# Patient Record
Sex: FEMALE | Race: BLACK OR AFRICAN AMERICAN | Employment: OTHER | URBAN - METROPOLITAN AREA
[De-identification: names, ages, dates, MRNs, and addresses within clinical notes are randomized per-mention and may not be internally consistent; named-entity substitution may affect disease eponyms.]

---

## 2017-02-27 RX ORDER — PANTOPRAZOLE SODIUM 40 MG/1
40 TABLET, DELAYED RELEASE ORAL
Qty: 30 TABLET | Refills: 3 | Status: SHIPPED | OUTPATIENT
Start: 2017-02-27 | End: 2017-10-12 | Stop reason: ALTCHOICE

## 2017-02-27 RX ORDER — LORAZEPAM 1 MG/1
TABLET ORAL
Qty: 30 TABLET | Refills: 0 | Status: SHIPPED | OUTPATIENT
Start: 2017-02-27 | End: 2017-05-02

## 2017-02-27 RX ORDER — IBUPROFEN 600 MG/1
600 TABLET ORAL EVERY 8 HOURS PRN
Qty: 45 TABLET | Refills: 0 | Status: SHIPPED | OUTPATIENT
Start: 2017-02-27 | End: 2017-08-27

## 2017-03-14 RX ORDER — IBUPROFEN 600 MG/1
TABLET ORAL
Refills: 0 | OUTPATIENT
Start: 2017-03-14

## 2017-04-19 ENCOUNTER — LAB ENCOUNTER (OUTPATIENT)
Dept: LAB | Facility: HOSPITAL | Age: 72
End: 2017-04-19
Attending: INTERNAL MEDICINE
Payer: MEDICARE

## 2017-04-19 DIAGNOSIS — M06.9 RHEUMATOID ARTHRITIS INVOLVING MULTIPLE SITES, UNSPECIFIED RHEUMATOID FACTOR PRESENCE: ICD-10-CM

## 2017-04-19 DIAGNOSIS — Z51.81 ENCOUNTER FOR THERAPEUTIC DRUG MONITORING: ICD-10-CM

## 2017-04-19 PROCEDURE — 86140 C-REACTIVE PROTEIN: CPT

## 2017-04-19 PROCEDURE — 85652 RBC SED RATE AUTOMATED: CPT

## 2017-04-19 PROCEDURE — 36415 COLL VENOUS BLD VENIPUNCTURE: CPT

## 2017-04-19 PROCEDURE — 82565 ASSAY OF CREATININE: CPT

## 2017-04-19 PROCEDURE — 84450 TRANSFERASE (AST) (SGOT): CPT

## 2017-04-19 PROCEDURE — 85025 COMPLETE CBC W/AUTO DIFF WBC: CPT

## 2017-04-19 PROCEDURE — 82040 ASSAY OF SERUM ALBUMIN: CPT

## 2017-05-02 ENCOUNTER — OFFICE VISIT (OUTPATIENT)
Dept: INTERNAL MEDICINE CLINIC | Facility: CLINIC | Age: 72
End: 2017-05-02

## 2017-05-02 VITALS
BODY MASS INDEX: 32.65 KG/M2 | SYSTOLIC BLOOD PRESSURE: 130 MMHG | HEART RATE: 74 BPM | TEMPERATURE: 98 F | DIASTOLIC BLOOD PRESSURE: 78 MMHG | HEIGHT: 65 IN | RESPIRATION RATE: 17 BRPM | WEIGHT: 196 LBS | OXYGEN SATURATION: 98 %

## 2017-05-02 DIAGNOSIS — Z12.11 SCREEN FOR COLON CANCER: ICD-10-CM

## 2017-05-02 DIAGNOSIS — Z00.00 MEDICARE ANNUAL WELLNESS VISIT, SUBSEQUENT: ICD-10-CM

## 2017-05-02 DIAGNOSIS — F41.0 ANXIETY ATTACK: ICD-10-CM

## 2017-05-02 DIAGNOSIS — Z23 NEED FOR VACCINATION FOR STREP PNEUMONIAE: ICD-10-CM

## 2017-05-02 DIAGNOSIS — Z12.31 VISIT FOR SCREENING MAMMOGRAM: ICD-10-CM

## 2017-05-02 DIAGNOSIS — Z13.6 SCREENING FOR CARDIOVASCULAR CONDITION: ICD-10-CM

## 2017-05-02 DIAGNOSIS — M05.719 RHEUMATOID ARTHRITIS INVOLVING SHOULDER WITH POSITIVE RHEUMATOID FACTOR, UNSPECIFIED LATERALITY (HCC): ICD-10-CM

## 2017-05-02 PROCEDURE — G0439 PPPS, SUBSEQ VISIT: HCPCS | Performed by: INTERNAL MEDICINE

## 2017-05-02 RX ORDER — LORAZEPAM 1 MG/1
TABLET ORAL
Qty: 30 TABLET | Refills: 1 | Status: SHIPPED | OUTPATIENT
Start: 2017-05-02 | End: 2018-02-14

## 2017-05-02 NOTE — PROGRESS NOTES
HPI:   Tete Arguello is a 80year old female who presents for a Medicare Subsequent Annual Wellness visit (Pt already had Initial Annual Wellness). H/o RA. On Enbrel. Doing well so far. Maintains active lifestyle. Eats vegetables and fatty food. MG Oral Tab Take 4 tablets every Wednesday with dinner. folic acid 1 MG Oral Tab Take one every morning. aspirin 325 MG Oral Tab Take  by mouth.  take 1 tablet (325MG)  by oral route  every day   Multiple Vitamins-Calcium (ONE-A-DAY WOMENS FORMULA) Oral Both Eyes Visual Acuity: Corrected Both Eyes Chart Acuity: 20/25          General Appearance:  Alert, cooperative, no distress, appears stated age   Head:  Normocephalic, without obvious abnormality, atraumatic   Eyes:  PERRL, conjunctiva/corneas clear, Frandy Vasques does not have a Living Will on file in [address] [place] [address] Rd.  The patient has this document but we do not have it in [facility], and patient is instructed to get our office a copy of it for scanning into [employer] on file in Epic:    Mike Yes    Hearing Problems?: No     Functional Status     Hearing Problems?: No    Vision Problems? : No    Difficulty walking?: No    Difficulty dressing or bathing?: No    Problems with daily activities? : No    Memory Problems?: No      Fall/Risk Assessmen Preventative Physical Exam only, or if medically necessary Electrocardiogram date       Colorectal Cancer Screening      Colonoscopy Screen every 10 years ordered Update Health Maintenance if applicable    Flex Sigmoidoscopy Screen every 10 years No result prescription benefits, but Medicare does not cover unless Medically needed    Zoster  Not covered by Medicare Part B No vaccine history found This may be covered with your pharmacy  prescription benefits        SPECIFIC DISEASE MONITORING Internal Lab or P

## 2017-05-02 NOTE — PATIENT INSTRUCTIONS
Miranda Thayer's SCREENING SCHEDULE   Tests on this list are recommended by your physician but may not be covered, or covered at this frequency, by your insurer. Please check with your insurance carrier before scheduling to verify coverage.    Lynell Fabry Screen  Covered every 5 years No results found for this or any previous visit. No flowsheet data found. Fecal Occult Blood   Covered Annually No results found for: FOB, OCCULTSTOOL No flowsheet data found.      Barium Enema-   uncomfortable but covered birthday    Hepatitis B for Moderate/High Risk       No orders found for this or any previous visit.  Medium/high risk factors:   End-stage renal disease   Hemophiliacs who received Factor VIII or IX concentrates   Clients of institutions for the mentally r

## 2017-05-05 ENCOUNTER — NURSE ONLY (OUTPATIENT)
Dept: INTERNAL MEDICINE CLINIC | Facility: CLINIC | Age: 72
End: 2017-05-05

## 2017-05-05 DIAGNOSIS — Z51.81 ENCOUNTER FOR THERAPEUTIC DRUG MONITORING: ICD-10-CM

## 2017-05-05 DIAGNOSIS — M06.9 RHEUMATOID ARTHRITIS INVOLVING MULTIPLE SITES, UNSPECIFIED RHEUMATOID FACTOR PRESENCE: ICD-10-CM

## 2017-05-05 DIAGNOSIS — Z13.6 SCREENING FOR CARDIOVASCULAR CONDITION: ICD-10-CM

## 2017-05-05 PROCEDURE — 80061 LIPID PANEL: CPT | Performed by: INTERNAL MEDICINE

## 2017-05-05 PROCEDURE — 80053 COMPREHEN METABOLIC PANEL: CPT | Performed by: INTERNAL MEDICINE

## 2017-05-05 PROCEDURE — 36415 COLL VENOUS BLD VENIPUNCTURE: CPT | Performed by: INTERNAL MEDICINE

## 2017-05-05 PROCEDURE — 86140 C-REACTIVE PROTEIN: CPT | Performed by: INTERNAL MEDICINE

## 2017-05-05 PROCEDURE — 85652 RBC SED RATE AUTOMATED: CPT | Performed by: INTERNAL MEDICINE

## 2017-05-05 PROCEDURE — 85025 COMPLETE CBC W/AUTO DIFF WBC: CPT | Performed by: INTERNAL MEDICINE

## 2017-08-28 RX ORDER — IBUPROFEN 600 MG/1
TABLET ORAL
Qty: 60 TABLET | Refills: 1 | Status: SHIPPED | OUTPATIENT
Start: 2017-08-28 | End: 2019-01-27

## 2017-09-06 ENCOUNTER — LAB ENCOUNTER (OUTPATIENT)
Dept: LAB | Facility: HOSPITAL | Age: 72
End: 2017-09-06
Attending: INTERNAL MEDICINE
Payer: MEDICARE

## 2017-09-06 DIAGNOSIS — Z51.81 ENCOUNTER FOR THERAPEUTIC DRUG MONITORING: ICD-10-CM

## 2017-09-06 DIAGNOSIS — M06.9 RHEUMATOID ARTHRITIS INVOLVING MULTIPLE SITES, UNSPECIFIED RHEUMATOID FACTOR PRESENCE: ICD-10-CM

## 2017-09-06 LAB
ALBUMIN SERPL BCP-MCNC: 4.1 G/DL (ref 3.5–4.8)
AST SERPL-CCNC: 24 U/L (ref 15–41)
BASOPHILS # BLD: 0 K/UL (ref 0–0.2)
BASOPHILS NFR BLD: 1 %
CREAT SERPL-MCNC: 0.89 MG/DL (ref 0.5–1.5)
CRP SERPL-MCNC: 0.5 MG/DL (ref 0–0.9)
EOSINOPHIL # BLD: 0.1 K/UL (ref 0–0.7)
EOSINOPHIL NFR BLD: 1 %
ERYTHROCYTE [DISTWIDTH] IN BLOOD BY AUTOMATED COUNT: 15 % (ref 11–15)
ERYTHROCYTE [SEDIMENTATION RATE] IN BLOOD: 16 MM/HR (ref 0–30)
HCT VFR BLD AUTO: 41.2 % (ref 35–48)
HGB BLD-MCNC: 13.2 G/DL (ref 12–16)
LYMPHOCYTES # BLD: 1.8 K/UL (ref 1–4)
LYMPHOCYTES NFR BLD: 33 %
MCH RBC QN AUTO: 23.7 PG (ref 27–32)
MCHC RBC AUTO-ENTMCNC: 32 G/DL (ref 32–37)
MCV RBC AUTO: 74.2 FL (ref 80–100)
MONOCYTES # BLD: 0.6 K/UL (ref 0–1)
MONOCYTES NFR BLD: 11 %
NEUTROPHILS # BLD AUTO: 3 K/UL (ref 1.8–7.7)
NEUTROPHILS NFR BLD: 55 %
PLATELET # BLD AUTO: 215 K/UL (ref 140–400)
PMV BLD AUTO: 9.4 FL (ref 7.4–10.3)
RBC # BLD AUTO: 5.56 M/UL (ref 3.7–5.4)
WBC # BLD AUTO: 5.5 K/UL (ref 4–11)

## 2017-09-06 PROCEDURE — 82565 ASSAY OF CREATININE: CPT

## 2017-09-06 PROCEDURE — 85025 COMPLETE CBC W/AUTO DIFF WBC: CPT

## 2017-09-06 PROCEDURE — 85060 BLOOD SMEAR INTERPRETATION: CPT

## 2017-09-06 PROCEDURE — 86140 C-REACTIVE PROTEIN: CPT

## 2017-09-06 PROCEDURE — 82040 ASSAY OF SERUM ALBUMIN: CPT

## 2017-09-06 PROCEDURE — 36415 COLL VENOUS BLD VENIPUNCTURE: CPT

## 2017-09-06 PROCEDURE — 85652 RBC SED RATE AUTOMATED: CPT

## 2017-09-06 PROCEDURE — 84450 TRANSFERASE (AST) (SGOT): CPT

## 2017-10-12 ENCOUNTER — OFFICE VISIT (OUTPATIENT)
Dept: INTERNAL MEDICINE CLINIC | Facility: CLINIC | Age: 72
End: 2017-10-12

## 2017-10-12 VITALS
RESPIRATION RATE: 17 BRPM | HEIGHT: 65 IN | SYSTOLIC BLOOD PRESSURE: 130 MMHG | HEART RATE: 90 BPM | BODY MASS INDEX: 31.82 KG/M2 | TEMPERATURE: 98 F | OXYGEN SATURATION: 98 % | WEIGHT: 191 LBS | DIASTOLIC BLOOD PRESSURE: 80 MMHG

## 2017-10-12 DIAGNOSIS — Z23 NEEDS FLU SHOT: ICD-10-CM

## 2017-10-12 DIAGNOSIS — Z12.11 COLON CANCER SCREENING: ICD-10-CM

## 2017-10-12 DIAGNOSIS — F51.01 PRIMARY INSOMNIA: ICD-10-CM

## 2017-10-12 DIAGNOSIS — Z12.31 VISIT FOR SCREENING MAMMOGRAM: ICD-10-CM

## 2017-10-12 DIAGNOSIS — E66.09 CLASS 1 OBESITY DUE TO EXCESS CALORIES WITHOUT SERIOUS COMORBIDITY WITH BODY MASS INDEX (BMI) OF 31.0 TO 31.9 IN ADULT: ICD-10-CM

## 2017-10-12 DIAGNOSIS — N64.4 BILATERAL MASTODYNIA: Primary | ICD-10-CM

## 2017-10-12 DIAGNOSIS — Z23 NEED FOR VACCINATION FOR STREP PNEUMONIAE: ICD-10-CM

## 2017-10-12 DIAGNOSIS — Z78.0 MENOPAUSE: ICD-10-CM

## 2017-10-12 PROCEDURE — 99214 OFFICE O/P EST MOD 30 MIN: CPT | Performed by: INTERNAL MEDICINE

## 2017-10-12 PROCEDURE — G0008 ADMIN INFLUENZA VIRUS VAC: HCPCS | Performed by: INTERNAL MEDICINE

## 2017-10-12 PROCEDURE — 90653 IIV ADJUVANT VACCINE IM: CPT | Performed by: INTERNAL MEDICINE

## 2017-10-12 RX ORDER — TRAZODONE HYDROCHLORIDE 100 MG/1
100 TABLET ORAL NIGHTLY PRN
Qty: 90 TABLET | Refills: 1 | Status: SHIPPED | OUTPATIENT
Start: 2017-10-12 | End: 2018-02-28

## 2017-10-12 NOTE — PROGRESS NOTES
HPI:    Patient ID: Myron Salinas is a 71year old female. HPI  C/o tingling and funny sensation of both breast around areolar area. No nipple discharge, palpable mass. C/o poor sleep. Had difficulty falling asleep. Lives along.  Denies caffeine  Ab person, place, and time. She appears well-developed. No distress. HENT:   Head: Normocephalic. Mouth/Throat: Oropharynx is clear and moist.   Eyes: Conjunctivae and EOM are normal. Pupils are equal, round, and reactive to light.    Neck: Normal range of Orders Placed This Encounter      Pneumococcal (Prevnar 13) (76510) (DX V03.82/Z23)    Meds This Visit:  Signed Prescriptions Disp Refills    TraZODone HCl 100 MG Oral Tab 90 tablet 1      Sig: Take 1 tablet (100 mg total) by mouth nightly

## 2017-10-12 NOTE — PROGRESS NOTES
Flu shot administered in left arm IM  Patient denies allergy to eggs, flu shot before, being sick today, diagnosed with Guillain barre syndrome. Patient tolerated well no reaction at this time.   Date of Birth Verified   Ordering Dr. Beronica Colbert

## 2017-11-01 RX ORDER — ETANERCEPT 50 MG/ML
50 SOLUTION SUBCUTANEOUS WEEKLY
Qty: 12 ML | Refills: 0 | Status: SHIPPED | OUTPATIENT
Start: 2017-11-01 | End: 2018-02-28

## 2017-11-01 NOTE — TELEPHONE ENCOUNTER
LOV:12-14  Last Filled:10-19-16, #12 with 3 refills  Labs:   Future Appointments  Date Time Provider Willie Brandon   11/24/2017 2:00 PM [facility] DEXA RM1 [facility] DEXA EM [facility]   11/24/2017 2:40 PM [facility] VICKI RM2 [facility] VICKI EM [facility]       Please Advise

## 2017-12-01 RX ORDER — LORAZEPAM 1 MG/1
1 TABLET ORAL NIGHTLY
Qty: 90 TABLET | Refills: 0 | Status: SHIPPED | OUTPATIENT
Start: 2017-12-01 | End: 2018-09-19

## 2017-12-28 ENCOUNTER — HOSPITAL ENCOUNTER (OUTPATIENT)
Dept: MAMMOGRAPHY | Facility: HOSPITAL | Age: 72
Discharge: HOME | End: 2017-12-28
Attending: INTERNAL MEDICINE
Payer: MEDICARE

## 2017-12-28 ENCOUNTER — HOSPITAL ENCOUNTER (OUTPATIENT)
Dept: BONE DENSITY | Facility: HOSPITAL | Age: 72
Discharge: HOME | End: 2017-12-28
Attending: INTERNAL MEDICINE
Payer: MEDICARE

## 2017-12-28 DIAGNOSIS — Z12.31 VISIT FOR SCREENING MAMMOGRAM: ICD-10-CM

## 2017-12-28 DIAGNOSIS — N64.4 BILATERAL MASTODYNIA: ICD-10-CM

## 2017-12-28 DIAGNOSIS — Z78.0 MENOPAUSE: ICD-10-CM

## 2017-12-28 PROCEDURE — 77067 SCR MAMMO BI INCL CAD: CPT | Performed by: INTERNAL MEDICINE

## 2017-12-28 PROCEDURE — 77080 DXA BONE DENSITY AXIAL: CPT | Performed by: INTERNAL MEDICINE

## 2018-01-29 RX ORDER — MEDROXYPROGESTERONE ACETATE 150 MG/ML
50 INJECTION, SUSPENSION INTRAMUSCULAR WEEKLY
Qty: 12 ML | Refills: 0 | OUTPATIENT
Start: 2018-01-29

## 2018-01-29 NOTE — TELEPHONE ENCOUNTER
HON:[id number]  Last Filled:11-1, 12ml with 0 refill  Labs:  No future appointments.     Please Advise

## 2018-02-14 ENCOUNTER — OFFICE VISIT (OUTPATIENT)
Dept: INTERNAL MEDICINE CLINIC | Facility: CLINIC | Age: 73
End: 2018-02-14

## 2018-02-14 VITALS
SYSTOLIC BLOOD PRESSURE: 110 MMHG | HEART RATE: 77 BPM | BODY MASS INDEX: 31.16 KG/M2 | WEIGHT: 187 LBS | DIASTOLIC BLOOD PRESSURE: 60 MMHG | TEMPERATURE: 98 F | HEIGHT: 65 IN | RESPIRATION RATE: 17 BRPM | OXYGEN SATURATION: 98 %

## 2018-02-14 DIAGNOSIS — R20.2 PARESTHESIA AND PAIN OF LEFT EXTREMITY: Primary | ICD-10-CM

## 2018-02-14 DIAGNOSIS — R73.9 HYPERGLYCEMIA: ICD-10-CM

## 2018-02-14 DIAGNOSIS — M05.9 RHEUMATOID ARTHRITIS WITH POSITIVE RHEUMATOID FACTOR, INVOLVING UNSPECIFIED SITE (HCC): ICD-10-CM

## 2018-02-14 DIAGNOSIS — M79.609 PARESTHESIA AND PAIN OF LEFT EXTREMITY: Primary | ICD-10-CM

## 2018-02-14 PROBLEM — R12 HEART BURN: Status: ACTIVE | Noted: 2018-02-14

## 2018-02-14 PROCEDURE — 99213 OFFICE O/P EST LOW 20 MIN: CPT | Performed by: INTERNAL MEDICINE

## 2018-02-14 NOTE — PROGRESS NOTES
HPI:    Patient ID: Max Arzate is a 71year old female. HPI    H/O RA, retired [employer] , now works part time as . C/o \" feeling of worms crawling on her left lower leg\". Denies weakness, low back pain. No urine , stool Allergies:No Known Allergies   PHYSICAL EXAM:   Physical Exam   Constitutional: She is oriented to person, place, and time. She appears well-developed. No distress. HENT:   Head: Normocephalic.    Mouth/Throat: Oropharynx is clear and moist. No oropha

## 2018-02-15 ENCOUNTER — TELEPHONE (OUTPATIENT)
Dept: RHEUMATOLOGY | Facility: CLINIC | Age: 73
End: 2018-02-15

## 2018-02-15 ENCOUNTER — LAB ENCOUNTER (OUTPATIENT)
Dept: LAB | Facility: HOSPITAL | Age: 73
End: 2018-02-15
Attending: INTERNAL MEDICINE
Payer: MEDICARE

## 2018-02-15 DIAGNOSIS — R20.2 PARESTHESIA AND PAIN OF LEFT EXTREMITY: ICD-10-CM

## 2018-02-15 DIAGNOSIS — Z51.81 ENCOUNTER FOR THERAPEUTIC DRUG MONITORING: ICD-10-CM

## 2018-02-15 DIAGNOSIS — M05.79 SEROPOSITIVE RHEUMATOID ARTHRITIS OF MULTIPLE SITES (HCC): ICD-10-CM

## 2018-02-15 DIAGNOSIS — R79.9 ABNORMAL FINDING OF BLOOD CHEMISTRY: ICD-10-CM

## 2018-02-15 DIAGNOSIS — M79.609 PARESTHESIA AND PAIN OF LEFT EXTREMITY: ICD-10-CM

## 2018-02-15 LAB
ALBUMIN SERPL BCP-MCNC: 3.9 G/DL (ref 3.5–4.8)
ALBUMIN/GLOB SERPL: 1.1 {RATIO} (ref 1–2)
ALP SERPL-CCNC: 71 U/L (ref 32–100)
ALT SERPL-CCNC: 34 U/L (ref 14–54)
ANION GAP SERPL CALC-SCNC: 6 MMOL/L (ref 0–18)
AST SERPL-CCNC: 30 U/L (ref 15–41)
BASOPHILS # BLD: 0 K/UL (ref 0–0.2)
BASOPHILS NFR BLD: 1 %
BILIRUB SERPL-MCNC: 0.8 MG/DL (ref 0.3–1.2)
BUN SERPL-MCNC: 19 MG/DL (ref 8–20)
BUN/CREAT SERPL: 19.6 (ref 10–20)
CALCIUM SERPL-MCNC: 9.2 MG/DL (ref 8.5–10.5)
CHLORIDE SERPL-SCNC: 105 MMOL/L (ref 95–110)
CO2 SERPL-SCNC: 26 MMOL/L (ref 22–32)
CREAT SERPL-MCNC: 0.97 MG/DL (ref 0.5–1.5)
CRP SERPL-MCNC: 0.5 MG/DL (ref 0–0.9)
EOSINOPHIL # BLD: 0.1 K/UL (ref 0–0.7)
EOSINOPHIL NFR BLD: 2 %
ERYTHROCYTE [DISTWIDTH] IN BLOOD BY AUTOMATED COUNT: 14.7 % (ref 11–15)
ERYTHROCYTE [SEDIMENTATION RATE] IN BLOOD: 15 MM/HR (ref 0–30)
FOLATE SERPL-MCNC: >24 NG/ML
GLOBULIN PLAS-MCNC: 3.4 G/DL (ref 2.5–3.7)
GLUCOSE SERPL-MCNC: 105 MG/DL (ref 70–99)
HCT VFR BLD AUTO: 40.6 % (ref 35–48)
HGB BLD-MCNC: 12.9 G/DL (ref 12–16)
LYMPHOCYTES # BLD: 1.8 K/UL (ref 1–4)
LYMPHOCYTES NFR BLD: 35 %
MCH RBC QN AUTO: 24 PG (ref 27–32)
MCHC RBC AUTO-ENTMCNC: 31.7 G/DL (ref 32–37)
MCV RBC AUTO: 75.7 FL (ref 80–100)
MONOCYTES # BLD: 0.6 K/UL (ref 0–1)
MONOCYTES NFR BLD: 11 %
NEUTROPHILS # BLD AUTO: 2.7 K/UL (ref 1.8–7.7)
NEUTROPHILS NFR BLD: 52 %
OSMOLALITY UR CALC.SUM OF ELEC: 287 MOSM/KG (ref 275–295)
PATIENT FASTING: YES
PLATELET # BLD AUTO: 254 K/UL (ref 140–400)
PMV BLD AUTO: 9.9 FL (ref 7.4–10.3)
POTASSIUM SERPL-SCNC: 4.3 MMOL/L (ref 3.3–5.1)
PROT SERPL-MCNC: 7.3 G/DL (ref 5.9–8.4)
RBC # BLD AUTO: 5.36 M/UL (ref 3.7–5.4)
SODIUM SERPL-SCNC: 137 MMOL/L (ref 136–144)
VIT B12 SERPL-MCNC: 835 PG/ML (ref 181–914)
WBC # BLD AUTO: 5.3 K/UL (ref 4–11)

## 2018-02-15 PROCEDURE — 82746 ASSAY OF FOLIC ACID SERUM: CPT

## 2018-02-15 PROCEDURE — 85025 COMPLETE CBC W/AUTO DIFF WBC: CPT

## 2018-02-15 PROCEDURE — 85652 RBC SED RATE AUTOMATED: CPT

## 2018-02-15 PROCEDURE — 82607 VITAMIN B-12: CPT

## 2018-02-15 PROCEDURE — 36415 COLL VENOUS BLD VENIPUNCTURE: CPT

## 2018-02-15 PROCEDURE — 86140 C-REACTIVE PROTEIN: CPT

## 2018-02-15 PROCEDURE — 80053 COMPREHEN METABOLIC PANEL: CPT

## 2018-02-15 PROCEDURE — 83036 HEMOGLOBIN GLYCOSYLATED A1C: CPT

## 2018-02-16 LAB — HBA1C MFR BLD: 6 % (ref 4–6)

## 2018-02-26 RX ORDER — FOLIC ACID 1 MG/1
TABLET ORAL
Qty: 90 TABLET | Refills: 3 | Status: SHIPPED | OUTPATIENT
Start: 2018-02-26 | End: 2018-09-19

## 2018-02-26 NOTE — TELEPHONE ENCOUNTER
EUS:03-83-92  Last Filled:10-19-16, #90 with 3 refills  Labs:   Future Appointments  Date Time Provider Willie Brandon   2/28/2018 4:20 PM Benito Sherman MD [facility] [place] [facility] [place]       Please Advise

## 2018-02-27 NOTE — PROGRESS NOTES
Yenifer Bonner is a 80-year-old woman with CCP and RF positive rheumatoid arthritis, who I last saw December 14th of 2016, with her arthritis doing well on Enbrel weekly, and methotrexate 10mg weekly.   Since, she stopped her methotrexate, and has remained only Allergies   PHYSICAL EXAM:   Physical Exam   Constitutional: She is oriented to person, place, and time. She appears well-developed and well-nourished. HENT:   Head: Normocephalic.    Eyes: Conjunctivae are normal.   Cardiovascular: Normal rate, regular r

## 2018-02-28 ENCOUNTER — OFFICE VISIT (OUTPATIENT)
Dept: RHEUMATOLOGY | Facility: CLINIC | Age: 73
End: 2018-02-28

## 2018-02-28 VITALS — DIASTOLIC BLOOD PRESSURE: 85 MMHG | SYSTOLIC BLOOD PRESSURE: 132 MMHG | HEART RATE: 69 BPM | HEIGHT: 65 IN

## 2018-02-28 DIAGNOSIS — M05.79 SEROPOSITIVE RHEUMATOID ARTHRITIS OF MULTIPLE SITES (HCC): Primary | ICD-10-CM

## 2018-02-28 DIAGNOSIS — Z51.81 ENCOUNTER FOR THERAPEUTIC DRUG MONITORING: ICD-10-CM

## 2018-02-28 PROCEDURE — G0463 HOSPITAL OUTPT CLINIC VISIT: HCPCS | Performed by: INTERNAL MEDICINE

## 2018-02-28 PROCEDURE — 99214 OFFICE O/P EST MOD 30 MIN: CPT | Performed by: INTERNAL MEDICINE

## 2018-04-27 NOTE — PROGRESS NOTES
Patient presented in office today for fasting blood draw. Blood draw successful in left arm  Adarsh:  Cbc,cmp,lipid,esr,crp  D. O.B verified   Orders per Doctor Co none

## 2018-04-30 ENCOUNTER — TELEPHONE (OUTPATIENT)
Dept: RHEUMATOLOGY | Facility: CLINIC | Age: 73
End: 2018-04-30

## 2018-05-16 ENCOUNTER — OFFICE VISIT (OUTPATIENT)
Dept: INTERNAL MEDICINE CLINIC | Facility: CLINIC | Age: 73
End: 2018-05-16

## 2018-05-16 VITALS
OXYGEN SATURATION: 98 % | SYSTOLIC BLOOD PRESSURE: 130 MMHG | BODY MASS INDEX: 31.27 KG/M2 | RESPIRATION RATE: 17 BRPM | HEIGHT: 65.5 IN | WEIGHT: 190 LBS | DIASTOLIC BLOOD PRESSURE: 82 MMHG | TEMPERATURE: 98 F | HEART RATE: 83 BPM

## 2018-05-16 DIAGNOSIS — Z28.21 VACCINATION REFUSED BY PATIENT: ICD-10-CM

## 2018-05-16 DIAGNOSIS — I87.2 VENOUS INSUFFICIENCY: ICD-10-CM

## 2018-05-16 DIAGNOSIS — Z00.00 ENCOUNTER FOR ANNUAL HEALTH EXAMINATION: Primary | ICD-10-CM

## 2018-05-16 DIAGNOSIS — E78.2 MIXED HYPERLIPIDEMIA: ICD-10-CM

## 2018-05-16 DIAGNOSIS — Z12.11 COLON CANCER SCREENING: ICD-10-CM

## 2018-05-16 DIAGNOSIS — M05.79 SEROPOSITIVE RHEUMATOID ARTHRITIS OF MULTIPLE SITES (HCC): ICD-10-CM

## 2018-05-16 PROCEDURE — G0439 PPPS, SUBSEQ VISIT: HCPCS | Performed by: INTERNAL MEDICINE

## 2018-05-16 RX ORDER — PREDNISOLONE ACETATE 10 MG/ML
SUSPENSION/ DROPS OPHTHALMIC
COMMUNITY
Start: 2016-09-27 | End: 2018-05-17 | Stop reason: ALTCHOICE

## 2018-05-16 NOTE — PROGRESS NOTES
HPI:   Landa Cushing is a 71year old female who presents for a Medicare Subsequent Annual Wellness visit (Pt already had Initial Annual Wellness). Sorethroat  And right sided earache for 3days witg fever of 102 F  . Took OTC alleve with relief.  Damian Box Encounters:  05/16/18 : 190 lb  02/14/18 : 187 lb  10/12/17 : 191 lb     Last Cholesterol Labs:     Lab Results  Component Value Date   CHOLEST 186 05/05/2017   HDL 47 05/05/2017    (H) 05/05/2017   TRIG 84 05/05/2017          Last Chemistry Labs: of breath with exertion  CARDIOVASCULAR: denies chest pain on exertion  GI: denies abdominal pain, denies heartburn  : denies dysuria, vaginal discharge or itching, no complaint of urinary incontinen   MUSCULOSKELETAL: denies back pain  NEURO: denies hea sounds active all four quadrants,  no masses, no organomegaly, healed surgical scar. Pelvic: Deferred   Extremities: Extremities normal, atraumatic, no cyanosis ,  varicose veins bilateral lower extremities ,trace pedal edema.     Pulses: 2+ and symmetri patient indicates understanding of these issues and agrees to the plan. Reinforced healthy diet, lifestyle, and exercise. Lab work ordered. Consult ordered. Continue with current treatment plan. Follow up in  6  month(s).     Return in about 6 months ( Density Screening      Dexascan Every two years Last Dexa Scan:   XR DEXA BONE DENSITOMETRY (CPT=77080) 12/28/2017    No flowsheet data found.     Pap and Pelvic      Pap: Every 3 yrs age 21-65 or Pap+HPV every 5 yrs age 33-67, age 72 and older at high risk

## 2018-05-16 NOTE — PATIENT INSTRUCTIONS
Arnulfo Thayer's SCREENING SCHEDULE   Tests on this list are recommended by your physician but may not be covered, or covered at this frequency, by your insurer. Please check with your insurance carrier before scheduling to verify coverage.    Cornelious Halo Colorectal Cancer Screening  Covered up to Age 76     Colonoscopy Screen   Covered every 10 years- more often if abnormal Colonoscopy,10 Years due on 08/09/1996 Update Health Maintenance if applicable    Flex Sigmoidoscopy Screen  Covered every 5 years N (Prevnar)  Covered Once after 65   Orders placed or performed in visit on 10/12/17  -PNEUMOCOCCAL VACC, 13 CANDY IM   Orders placed or performed in visit on 05/02/17  -PNEUMOCOCCAL VACC, 13 CANDY IM    Please get once after your 65th birthday    Pneumococcal 2

## 2018-05-17 PROBLEM — R12 HEART BURN: Status: RESOLVED | Noted: 2018-02-14 | Resolved: 2018-05-17

## 2018-09-06 ENCOUNTER — LAB ENCOUNTER (OUTPATIENT)
Dept: LAB | Facility: HOSPITAL | Age: 73
End: 2018-09-06
Attending: INTERNAL MEDICINE
Payer: MEDICARE

## 2018-09-06 DIAGNOSIS — Z51.81 ENCOUNTER FOR THERAPEUTIC DRUG MONITORING: ICD-10-CM

## 2018-09-06 DIAGNOSIS — M05.79 SEROPOSITIVE RHEUMATOID ARTHRITIS OF MULTIPLE SITES (HCC): ICD-10-CM

## 2018-09-06 DIAGNOSIS — E78.2 MIXED HYPERLIPIDEMIA: ICD-10-CM

## 2018-09-06 LAB
ALBUMIN SERPL BCP-MCNC: 3.9 G/DL (ref 3.5–4.8)
AST SERPL-CCNC: 25 U/L (ref 15–41)
BASOPHILS # BLD: 0 K/UL (ref 0–0.2)
BASOPHILS NFR BLD: 1 %
CHOLEST SERPL-MCNC: 226 MG/DL (ref 110–200)
CREAT SERPL-MCNC: 1 MG/DL (ref 0.5–1.5)
CRP SERPL-MCNC: 0.6 MG/DL (ref 0–0.9)
EOSINOPHIL # BLD: 0.1 K/UL (ref 0–0.7)
EOSINOPHIL NFR BLD: 2 %
ERYTHROCYTE [DISTWIDTH] IN BLOOD BY AUTOMATED COUNT: 15 % (ref 11–15)
ERYTHROCYTE [SEDIMENTATION RATE] IN BLOOD: 19 MM/HR (ref 0–30)
HCT VFR BLD AUTO: 39.2 % (ref 35–48)
HDLC SERPL-MCNC: 49 MG/DL
HGB BLD-MCNC: 12.5 G/DL (ref 12–16)
LDLC SERPL CALC-MCNC: 154 MG/DL (ref 0–99)
LYMPHOCYTES # BLD: 1.5 K/UL (ref 1–4)
LYMPHOCYTES NFR BLD: 32 %
MCH RBC QN AUTO: 23.9 PG (ref 27–32)
MCHC RBC AUTO-ENTMCNC: 31.8 G/DL (ref 32–37)
MCV RBC AUTO: 75.1 FL (ref 80–100)
MONOCYTES # BLD: 0.5 K/UL (ref 0–1)
MONOCYTES NFR BLD: 11 %
NEUTROPHILS # BLD AUTO: 2.6 K/UL (ref 1.8–7.7)
NEUTROPHILS NFR BLD: 55 %
NONHDLC SERPL-MCNC: 177 MG/DL
PLATELET # BLD AUTO: 186 K/UL (ref 140–400)
PMV BLD AUTO: 9.5 FL (ref 7.4–10.3)
RBC # BLD AUTO: 5.22 M/UL (ref 3.7–5.4)
TRIGL SERPL-MCNC: 117 MG/DL (ref 1–149)
WBC # BLD AUTO: 4.7 K/UL (ref 4–11)

## 2018-09-06 PROCEDURE — 82040 ASSAY OF SERUM ALBUMIN: CPT

## 2018-09-06 PROCEDURE — 36415 COLL VENOUS BLD VENIPUNCTURE: CPT

## 2018-09-06 PROCEDURE — 80061 LIPID PANEL: CPT

## 2018-09-06 PROCEDURE — 82565 ASSAY OF CREATININE: CPT

## 2018-09-06 PROCEDURE — 85652 RBC SED RATE AUTOMATED: CPT

## 2018-09-06 PROCEDURE — 86140 C-REACTIVE PROTEIN: CPT

## 2018-09-06 PROCEDURE — 85025 COMPLETE CBC W/AUTO DIFF WBC: CPT

## 2018-09-06 PROCEDURE — 84450 TRANSFERASE (AST) (SGOT): CPT

## 2018-09-19 ENCOUNTER — OFFICE VISIT (OUTPATIENT)
Dept: INTERNAL MEDICINE CLINIC | Facility: CLINIC | Age: 73
End: 2018-09-19
Payer: MEDICARE

## 2018-09-19 VITALS
TEMPERATURE: 98 F | HEIGHT: 65.5 IN | OXYGEN SATURATION: 99 % | DIASTOLIC BLOOD PRESSURE: 60 MMHG | WEIGHT: 191 LBS | SYSTOLIC BLOOD PRESSURE: 122 MMHG | BODY MASS INDEX: 31.44 KG/M2 | RESPIRATION RATE: 21 BRPM | HEART RATE: 79 BPM

## 2018-09-19 DIAGNOSIS — E78.2 MIXED HYPERLIPIDEMIA: Primary | ICD-10-CM

## 2018-09-19 DIAGNOSIS — F41.0 ANXIETY ATTACK: ICD-10-CM

## 2018-09-19 DIAGNOSIS — M67.911 TENDINOPATHY OF RIGHT SHOULDER: ICD-10-CM

## 2018-09-19 DIAGNOSIS — F51.01 PRIMARY INSOMNIA: ICD-10-CM

## 2018-09-19 DIAGNOSIS — M05.9 RHEUMATOID ARTHRITIS WITH POSITIVE RHEUMATOID FACTOR, INVOLVING UNSPECIFIED SITE (HCC): ICD-10-CM

## 2018-09-19 DIAGNOSIS — Z23 NEED FOR VACCINATION FOR STREP PNEUMONIAE: ICD-10-CM

## 2018-09-19 PROCEDURE — G0009 ADMIN PNEUMOCOCCAL VACCINE: HCPCS | Performed by: INTERNAL MEDICINE

## 2018-09-19 PROCEDURE — 90732 PPSV23 VACC 2 YRS+ SUBQ/IM: CPT | Performed by: INTERNAL MEDICINE

## 2018-09-19 PROCEDURE — 99214 OFFICE O/P EST MOD 30 MIN: CPT | Performed by: INTERNAL MEDICINE

## 2018-09-19 RX ORDER — ROSUVASTATIN CALCIUM 10 MG/1
10 TABLET, COATED ORAL NIGHTLY
Qty: 90 TABLET | Refills: 1 | Status: SHIPPED | OUTPATIENT
Start: 2018-09-19 | End: 2019-05-16 | Stop reason: ALTCHOICE

## 2018-09-19 RX ORDER — FOLIC ACID 1 MG/1
TABLET ORAL
Qty: 90 TABLET | Refills: 3 | Status: SHIPPED | OUTPATIENT
Start: 2018-09-19 | End: 2019-12-04

## 2018-09-19 RX ORDER — LORAZEPAM 1 MG/1
1 TABLET ORAL AS NEEDED
Qty: 60 TABLET | Refills: 0 | Status: SHIPPED | OUTPATIENT
Start: 2018-09-19 | End: 2019-05-16

## 2018-09-19 NOTE — PROGRESS NOTES
Vaccination administered in left arm IM  Patient tolerated well no reaction at this time, NO blood at injection site band aid applied.   Vaccine given:  pcv-23  Orders per Doctor Co

## 2018-09-19 NOTE — PROGRESS NOTES
HPI:    Patient ID: Octavio Siddiqi is a 68year old female. HPI   Known to RA , on Enbrel. . C/o soreness of right shoulder pain with overhead activities. Unable to recall trauma. Other joints with no pain. Poor sleep. A lot of bad dreams.  Feels Disp:  Rfl:    Multiple Vitamins-Calcium (ONE-A-DAY WOMENS FORMULA) Oral Tab Take  by mouth.  One-A-Day Womens Formula 18 mg iron-400 mcg-500 mg Ca tablet Disp:  Rfl:      Allergies:No Known Allergies   PHYSICAL EXAM:   Physical Exam   Nursing note and kristan 49   CHOLESTEROL, TOTAL      110 - 200 mg/dL 226 (H)   Triglycerides      1 - 149 mg/dL 117   NON HDL CHOL      <130 mg/dL 177 (H)   LDL Cholesterol Calc      0 - 99 mg/dL 154 (H)   CREATININE      0.50 - 1.50 mg/dL 1.00   GFR, Non-      >=

## 2018-09-20 ENCOUNTER — TELEPHONE (OUTPATIENT)
Dept: INTERNAL MEDICINE CLINIC | Facility: CLINIC | Age: 73
End: 2018-09-20

## 2018-09-20 NOTE — TELEPHONE ENCOUNTER
Pharmacy called for clarification on the Ativan 1mg , how many times a day can she take it?   Mehreen is the person who called

## 2018-09-20 NOTE — TELEPHONE ENCOUNTER
Talked to Dr Bess Calderón she stated it should only be one time daily   Called pharmacist and informed her as well

## 2018-10-11 ENCOUNTER — NURSE ONLY (OUTPATIENT)
Dept: INTERNAL MEDICINE CLINIC | Facility: CLINIC | Age: 73
End: 2018-10-11
Payer: MEDICARE

## 2018-10-11 DIAGNOSIS — Z23 NEED FOR INFLUENZA VACCINATION: Primary | ICD-10-CM

## 2018-10-11 PROCEDURE — G0008 ADMIN INFLUENZA VIRUS VAC: HCPCS | Performed by: INTERNAL MEDICINE

## 2018-10-11 PROCEDURE — 69209 REMOVE IMPACTED EAR WAX UNI: CPT | Performed by: INTERNAL MEDICINE

## 2018-10-11 PROCEDURE — 90653 IIV ADJUVANT VACCINE IM: CPT | Performed by: INTERNAL MEDICINE

## 2018-10-11 NOTE — PROGRESS NOTES
Flu shot administered in left arm IM  Patient denies allergy to eggs, flu shot before, being sick today, diagnosed with Guillain barre syndrome. Patient tolerated well no reaction at this time.   Date of Birth Verified   Ordering Dr. Tony Umana

## 2018-12-04 NOTE — PROGRESS NOTES
German Kramer is a 52-year-old woman with CCP and RF positive rheumatoid arthritis, who I last saw February 28th of 2018. Her arthritis has generally done well on Enbrel weekly. She denies having had any flares. She wakes up with minutes of stiffness.   No swo therapeutic drug monitoring. A new monitoring lab order was produced. 3. Left shoulder rotator cuff tendinitis. Much improved. 4.  Borderline diabetes. She will continue working on her diet.

## 2018-12-05 ENCOUNTER — OFFICE VISIT (OUTPATIENT)
Dept: RHEUMATOLOGY | Facility: CLINIC | Age: 73
End: 2018-12-05
Payer: MEDICARE

## 2018-12-05 VITALS
HEART RATE: 70 BPM | WEIGHT: 185.81 LBS | BODY MASS INDEX: 30.58 KG/M2 | DIASTOLIC BLOOD PRESSURE: 76 MMHG | SYSTOLIC BLOOD PRESSURE: 132 MMHG | HEIGHT: 65.5 IN

## 2018-12-05 DIAGNOSIS — Z51.81 THERAPEUTIC DRUG MONITORING: ICD-10-CM

## 2018-12-05 DIAGNOSIS — M05.79 SEROPOSITIVE RHEUMATOID ARTHRITIS OF MULTIPLE SITES (HCC): Primary | ICD-10-CM

## 2018-12-05 PROCEDURE — G0463 HOSPITAL OUTPT CLINIC VISIT: HCPCS | Performed by: INTERNAL MEDICINE

## 2018-12-05 PROCEDURE — 99214 OFFICE O/P EST MOD 30 MIN: CPT | Performed by: INTERNAL MEDICINE

## 2018-12-05 RX ORDER — ALFALFA 650 MG
2 TABLET ORAL DAILY
COMMUNITY
End: 2020-06-26

## 2019-01-28 NOTE — TELEPHONE ENCOUNTER
Requested Prescriptions     Pending Prescriptions Disp Refills   •  MG Oral Tab [Pharmacy Med Name: IBUPROFEN 600 MG TABLET] 60 tablet 0     Sig: TAKE ONE TABLET BY MOUTH EVERY 8 HOURS AS NEEDED FOR PAIN     Last office visit: 9-19-18  Medication la

## 2019-01-29 ENCOUNTER — PATIENT OUTREACH (OUTPATIENT)
Dept: INTERNAL MEDICINE CLINIC | Facility: CLINIC | Age: 74
End: 2019-01-29

## 2019-01-29 DIAGNOSIS — Z12.11 SCREEN FOR COLON CANCER: Primary | ICD-10-CM

## 2019-01-29 RX ORDER — IBUPROFEN 600 MG/1
TABLET ORAL
Qty: 60 TABLET | Refills: 0 | Status: SHIPPED | OUTPATIENT
Start: 2019-01-29 | End: 2019-06-16

## 2019-02-21 ENCOUNTER — NURSE ONLY (OUTPATIENT)
Dept: INTERNAL MEDICINE CLINIC | Facility: CLINIC | Age: 74
End: 2019-02-21
Payer: MEDICARE

## 2019-02-21 DIAGNOSIS — Z12.11 COLON CANCER SCREENING: Primary | ICD-10-CM

## 2019-02-21 PROCEDURE — 82274 ASSAY TEST FOR BLOOD FECAL: CPT | Performed by: INTERNAL MEDICINE

## 2019-02-22 LAB — HEMOCCULT STL QL: NEGATIVE

## 2019-03-04 ENCOUNTER — TELEPHONE (OUTPATIENT)
Dept: INTERNAL MEDICINE CLINIC | Facility: CLINIC | Age: 74
End: 2019-03-04

## 2019-04-02 ENCOUNTER — TELEPHONE (OUTPATIENT)
Dept: INTERNAL MEDICINE CLINIC | Facility: CLINIC | Age: 74
End: 2019-04-02

## 2019-04-04 NOTE — TELEPHONE ENCOUNTER
Last office visit: 9/19/2018 weakness  Last refill: 9/19/2018 qty:60   Has upcoming cpx appt 5/16/2019

## 2019-04-10 RX ORDER — LORAZEPAM 1 MG/1
1 TABLET ORAL AS NEEDED
Qty: 60 TABLET | Refills: 0 | OUTPATIENT
Start: 2019-04-10

## 2019-05-08 ENCOUNTER — LAB ENCOUNTER (OUTPATIENT)
Dept: LAB | Facility: HOSPITAL | Age: 74
End: 2019-05-08
Attending: INTERNAL MEDICINE
Payer: MEDICARE

## 2019-05-08 DIAGNOSIS — E78.2 MIXED HYPERLIPIDEMIA: ICD-10-CM

## 2019-05-08 DIAGNOSIS — Z51.81 THERAPEUTIC DRUG MONITORING: ICD-10-CM

## 2019-05-08 DIAGNOSIS — M05.79 SEROPOSITIVE RHEUMATOID ARTHRITIS OF MULTIPLE SITES (HCC): ICD-10-CM

## 2019-05-08 PROCEDURE — 86140 C-REACTIVE PROTEIN: CPT

## 2019-05-08 PROCEDURE — 36415 COLL VENOUS BLD VENIPUNCTURE: CPT

## 2019-05-08 PROCEDURE — 85652 RBC SED RATE AUTOMATED: CPT

## 2019-05-08 PROCEDURE — 80061 LIPID PANEL: CPT

## 2019-05-08 PROCEDURE — 85025 COMPLETE CBC W/AUTO DIFF WBC: CPT

## 2019-05-08 PROCEDURE — 80053 COMPREHEN METABOLIC PANEL: CPT

## 2019-05-16 ENCOUNTER — OFFICE VISIT (OUTPATIENT)
Dept: INTERNAL MEDICINE CLINIC | Facility: CLINIC | Age: 74
End: 2019-05-16
Payer: MEDICARE

## 2019-05-16 ENCOUNTER — TELEPHONE (OUTPATIENT)
Dept: RHEUMATOLOGY | Facility: CLINIC | Age: 74
End: 2019-05-16

## 2019-05-16 VITALS
OXYGEN SATURATION: 99 % | HEART RATE: 78 BPM | RESPIRATION RATE: 19 BRPM | HEIGHT: 65 IN | BODY MASS INDEX: 31 KG/M2 | SYSTOLIC BLOOD PRESSURE: 116 MMHG | TEMPERATURE: 98 F | DIASTOLIC BLOOD PRESSURE: 60 MMHG

## 2019-05-16 DIAGNOSIS — H25.019 CORTICAL AGE-RELATED CATARACT, UNSPECIFIED LATERALITY: ICD-10-CM

## 2019-05-16 DIAGNOSIS — Z00.00 ENCOUNTER FOR ANNUAL HEALTH EXAMINATION: Primary | ICD-10-CM

## 2019-05-16 DIAGNOSIS — Z12.11 SCREEN FOR COLON CANCER: ICD-10-CM

## 2019-05-16 DIAGNOSIS — Z12.39 SCREENING FOR BREAST CANCER: ICD-10-CM

## 2019-05-16 DIAGNOSIS — M05.79 SEROPOSITIVE RHEUMATOID ARTHRITIS OF MULTIPLE SITES (HCC): ICD-10-CM

## 2019-05-16 DIAGNOSIS — F41.0 ANXIETY ATTACK: ICD-10-CM

## 2019-05-16 PROCEDURE — 99497 ADVNCD CARE PLAN 30 MIN: CPT | Performed by: INTERNAL MEDICINE

## 2019-05-16 PROCEDURE — G0444 DEPRESSION SCREEN ANNUAL: HCPCS | Performed by: INTERNAL MEDICINE

## 2019-05-16 PROCEDURE — G0439 PPPS, SUBSEQ VISIT: HCPCS | Performed by: INTERNAL MEDICINE

## 2019-05-16 RX ORDER — LORAZEPAM 1 MG/1
1 TABLET ORAL NIGHTLY
Qty: 30 TABLET | Refills: 1 | Status: SHIPPED | OUTPATIENT
Start: 2019-05-16 | End: 2019-12-04

## 2019-05-16 NOTE — TELEPHONE ENCOUNTER
Fax received at [phone] needed. Bloc    Key- [id number]      Current Outpatient Medications:                    Etanercept (ENBREL SURECLICK) 50 MG/ML Subcutaneous Solution Auto-injector Inject 50 mg into the skin once a week.  Disp: 12 mL Rfl:

## 2019-05-16 NOTE — PATIENT INSTRUCTIONS
Arsenio Thayer's SCREENING SCHEDULE   Tests on this list are recommended by your physician but may not be covered, or covered at this frequency, by your insurer. Please check with your insurance carrier before scheduling to verify coverage.    Trina Santamaria Covered every 10 years- more often if abnormal Colonoscopy due on 08/09/1945 Update [facility] if applicable    Flex Sigmoidoscopy Screen  Covered every 5 years No results found for this or any previous visit. No flowsheet data found.      Fecal O 05/16/18   • PNEUMOCOCCAL VACC, 13 CANDY IM   Orders placed or performed in visit on 10/12/17   • PNEUMOCOCCAL VACC, 13 CANDY IM   Orders placed or performed in visit on 05/02/17   • PNEUMOCOCCAL VACC, 13 CANDY IM    Please get once after your 65th birthday    Maddie Hernandez

## 2019-05-16 NOTE — PROGRESS NOTES
HPI:   Myron Salinas is a 68year old female who presents for a Medicare Subsequent Annual Wellness visit (Pt already had Initial Annual Wellness). Patient is independent with ADL. She is grieving of her sister passing from [address] She had gained weig Last 3 Encounters:  12/05/18 : 185 lb 12.8 oz  09/19/18 : 191 lb  05/16/18 : 190 lb     Last Cholesterol Labs:   Lab Results   Component Value Date    CHOLEST 203 (H) 05/08/2019    HDL 56 05/08/2019     (H) 05/08/2019    TRIG 68 05/08/2019 vision  HEENT: denies nasal congestion, sinus pain   LUNGS: denies shortness of breath with exertion  CARDIOVASCULAR: denies chest pain on exertion  GI: denies abdominal pain, denies heartburn  : denies dysuria, vaginal discharge or itching, no complaint or gallop   Abdomen:   Soft, non-tender, bowel sounds active all four quadrants,  no masses, no organomegaly   Pelvic: Deferred   Extremities: Extremities normal, atraumatic, no cyanosis or edema. No joint deformity.     Pulses: 2+ and symmetric   Skin: Ski mgs QD prn for anxiety and 30 mins before flight  To be use only prn. Made aware that it can cause dependency with chronic use. Has a lot of support for grieving.      Seropositive rheumatoid arthritis of multiple sites [facility])  Doing well with enbrel Occult Blood (no units)   Date Value   02/21/2019 Negative    No flowsheet data found.     Glaucoma Screening      Ophthalmology Visit Annually: Diabetics, FHx Glaucoma, AA>50, > 65 Data entered on: 11/28/2016   Last Dilated Eye Exam 8/11/2016   Con AMB MEDICARE ANNUAL ASSESSMENT FEMALE [37933]

## 2019-05-17 NOTE — TELEPHONE ENCOUNTER
Carmine contacted at [phone] for reauthorization of Dominick joiner. VK#[id number] DX:M05.79  PA approved #[id number] unable to give dates due to system updates. Information will be on fax confirmation to office.  Pharmacy aware and will call pt when script is

## 2019-06-10 NOTE — PROGRESS NOTES
Alexa Armstrong is a 78-year-old woman with CCP and RF positive rheumatoid arthritis, who I last saw December 5th of 2018. Her arthritis has generally done well on Enbrel weekly. She denies having had any flares. She wakes up with minutes of stiffness.   No swol lab order was produced. 3. Left shoulder rotator cuff tendinitis. Much improved. 4.  Borderline diabetes, high cholesterol. She will continue working on her diet.

## 2019-06-12 ENCOUNTER — OFFICE VISIT (OUTPATIENT)
Dept: RHEUMATOLOGY | Facility: CLINIC | Age: 74
End: 2019-06-12
Payer: MEDICARE

## 2019-06-12 VITALS
SYSTOLIC BLOOD PRESSURE: 134 MMHG | WEIGHT: 191 LBS | HEART RATE: 71 BPM | DIASTOLIC BLOOD PRESSURE: 79 MMHG | HEIGHT: 65 IN | BODY MASS INDEX: 31.82 KG/M2

## 2019-06-12 DIAGNOSIS — Z51.81 THERAPEUTIC DRUG MONITORING: ICD-10-CM

## 2019-06-12 DIAGNOSIS — M05.79 SEROPOSITIVE RHEUMATOID ARTHRITIS OF MULTIPLE SITES (HCC): Primary | ICD-10-CM

## 2019-06-12 PROCEDURE — 99214 OFFICE O/P EST MOD 30 MIN: CPT | Performed by: INTERNAL MEDICINE

## 2019-06-12 PROCEDURE — G0463 HOSPITAL OUTPT CLINIC VISIT: HCPCS | Performed by: INTERNAL MEDICINE

## 2019-06-20 RX ORDER — IBUPROFEN 600 MG/1
TABLET ORAL
Qty: 60 TABLET | Refills: 0 | Status: SHIPPED | OUTPATIENT
Start: 2019-06-20 | End: 2019-12-04 | Stop reason: ALTCHOICE

## 2019-08-07 NOTE — H&P
[address] [facility] [address] Gastroenterology                                                                                                  Clinic History and Physical     Pa Social History: Social History    Tobacco Use      Smoking status: Never Smoker      Smokeless tobacco: Never Used    Alcohol use:  Yes      Alcohol/week: 0.0 standard drinks      Comment: social/occ    Drug use: No       Medications (Active prior to t extremities are warm and well perfused   Lung: effort normal and breath sounds normal, no respiratory distress, wheezes or rales  GI: soft, non-tender exam in all quadrants without rigidity or guarding, non-distended, no abnormal bowel sounds noted, no mas recommend follow-up. She is in agreement with this. All questions/concerns were addressed. Recommend:  See above    Orders This Visit:  No orders of the defined types were placed in this encounter.       Meds This Visit:  Requested Prescriptions

## 2019-08-14 ENCOUNTER — OFFICE VISIT (OUTPATIENT)
Dept: GASTROENTEROLOGY | Facility: CLINIC | Age: 74
End: 2019-08-14
Payer: MEDICARE

## 2019-08-14 VITALS
DIASTOLIC BLOOD PRESSURE: 80 MMHG | RESPIRATION RATE: 20 BRPM | WEIGHT: 188 LBS | HEART RATE: 69 BPM | SYSTOLIC BLOOD PRESSURE: 129 MMHG | HEIGHT: 65 IN | BODY MASS INDEX: 31.32 KG/M2

## 2019-08-14 DIAGNOSIS — Z12.11 SCREENING FOR COLON CANCER: Primary | ICD-10-CM

## 2019-08-14 DIAGNOSIS — Z86.010 HISTORY OF COLON POLYPS: ICD-10-CM

## 2019-08-14 PROCEDURE — S0285 CNSLT BEFORE SCREEN COLONOSC: HCPCS | Performed by: NURSE PRACTITIONER

## 2019-11-26 ENCOUNTER — LAB ENCOUNTER (OUTPATIENT)
Dept: LAB | Facility: HOSPITAL | Age: 74
End: 2019-11-26
Attending: INTERNAL MEDICINE
Payer: MEDICARE

## 2019-11-26 DIAGNOSIS — M05.79 SEROPOSITIVE RHEUMATOID ARTHRITIS OF MULTIPLE SITES (HCC): ICD-10-CM

## 2019-11-26 DIAGNOSIS — Z51.81 THERAPEUTIC DRUG MONITORING: ICD-10-CM

## 2019-11-26 PROCEDURE — 85025 COMPLETE CBC W/AUTO DIFF WBC: CPT

## 2019-11-26 PROCEDURE — 82565 ASSAY OF CREATININE: CPT

## 2019-11-26 PROCEDURE — 82040 ASSAY OF SERUM ALBUMIN: CPT

## 2019-11-26 PROCEDURE — 84450 TRANSFERASE (AST) (SGOT): CPT

## 2019-11-26 PROCEDURE — 85652 RBC SED RATE AUTOMATED: CPT

## 2019-11-26 PROCEDURE — 86140 C-REACTIVE PROTEIN: CPT

## 2019-11-26 PROCEDURE — 36415 COLL VENOUS BLD VENIPUNCTURE: CPT

## 2019-11-26 RX ORDER — ETANERCEPT 50 MG/ML
SOLUTION SUBCUTANEOUS
Qty: 12 SYRINGE | Refills: 0 | Status: SHIPPED | OUTPATIENT
Start: 2019-11-26 | End: 2020-03-12

## 2019-11-26 NOTE — TELEPHONE ENCOUNTER
Requested Prescriptions     Pending Prescriptions Disp Refills   • ENBREL SURECLICK 50 MG/ML Subcutaneous Solution Auto-injector [Pharmacy Med Name: ENBREL 50 MG/ML SURECLICK]  0     Sig: INJECT 50 MG INTO THE SKIN ONCE A WEEK     Last filled:6/12/19 #12 m 2.0 mg/dL 0.3   TOTAL PROTEIN      6.4 - 8.2 g/dL 7.7   Albumin      3.4 - 5.0 g/dL 3.7   Globulin      2.8 - 4.4 g/dL 4.0   A/G Ratio      1.0 - 2.0 0.9 (L)   SED RATE      0 - 30 mm/Hr 22   C-REACTIVE PROTEIN      <0.30 mg/dL <0.29     ASSESSMENT/PLAN:

## 2019-12-04 ENCOUNTER — OFFICE VISIT (OUTPATIENT)
Dept: INTERNAL MEDICINE CLINIC | Facility: CLINIC | Age: 74
End: 2019-12-04
Payer: MEDICARE

## 2019-12-04 VITALS
SYSTOLIC BLOOD PRESSURE: 118 MMHG | RESPIRATION RATE: 19 BRPM | DIASTOLIC BLOOD PRESSURE: 68 MMHG | HEART RATE: 79 BPM | OXYGEN SATURATION: 100 % | WEIGHT: 195 LBS | BODY MASS INDEX: 32.49 KG/M2 | HEIGHT: 65 IN

## 2019-12-04 DIAGNOSIS — Z12.11 COLON CANCER SCREENING: ICD-10-CM

## 2019-12-04 DIAGNOSIS — F41.9 ANXIETY: ICD-10-CM

## 2019-12-04 DIAGNOSIS — Z23 FLU VACCINE NEED: ICD-10-CM

## 2019-12-04 DIAGNOSIS — J30.1 SEASONAL ALLERGIC RHINITIS DUE TO POLLEN: ICD-10-CM

## 2019-12-04 DIAGNOSIS — D22.9 ATYPICAL NEVI: ICD-10-CM

## 2019-12-04 DIAGNOSIS — K21.9 GASTROESOPHAGEAL REFLUX DISEASE, ESOPHAGITIS PRESENCE NOT SPECIFIED: Primary | ICD-10-CM

## 2019-12-04 PROBLEM — Z51.81 THERAPEUTIC DRUG MONITORING: Status: RESOLVED | Noted: 2018-12-05 | Resolved: 2019-12-04

## 2019-12-04 PROCEDURE — 99214 OFFICE O/P EST MOD 30 MIN: CPT | Performed by: INTERNAL MEDICINE

## 2019-12-04 PROCEDURE — 90662 IIV NO PRSV INCREASED AG IM: CPT | Performed by: INTERNAL MEDICINE

## 2019-12-04 PROCEDURE — G0009 ADMIN PNEUMOCOCCAL VACCINE: HCPCS | Performed by: INTERNAL MEDICINE

## 2019-12-04 PROCEDURE — G0008 ADMIN INFLUENZA VIRUS VAC: HCPCS | Performed by: INTERNAL MEDICINE

## 2019-12-04 PROCEDURE — 90670 PCV13 VACCINE IM: CPT | Performed by: INTERNAL MEDICINE

## 2019-12-04 RX ORDER — LORATADINE 10 MG/1
10 TABLET ORAL DAILY
Qty: 60 TABLET | Refills: 0 | Status: SHIPPED | OUTPATIENT
Start: 2019-12-04 | End: 2020-06-26

## 2019-12-04 RX ORDER — LORAZEPAM 1 MG/1
1 TABLET ORAL AS NEEDED
Qty: 30 TABLET | Refills: 0 | Status: SHIPPED | OUTPATIENT
Start: 2019-12-04 | End: 2020-06-26

## 2019-12-04 RX ORDER — PANTOPRAZOLE SODIUM 40 MG/1
40 TABLET, DELAYED RELEASE ORAL
Qty: 30 TABLET | Refills: 0 | Status: SHIPPED | OUTPATIENT
Start: 2019-12-04 | End: 2020-06-26

## 2019-12-04 RX ORDER — FOLIC ACID 1 MG/1
TABLET ORAL
Qty: 90 TABLET | Refills: 3 | Status: SHIPPED | OUTPATIENT
Start: 2019-12-04 | End: 2020-01-15

## 2019-12-04 RX ORDER — FLUTICASONE PROPIONATE 50 MCG
2 SPRAY, SUSPENSION (ML) NASAL DAILY
Qty: 1 BOTTLE | Refills: 3 | Status: SHIPPED | OUTPATIENT
Start: 2019-12-04 | End: 2020-06-26

## 2019-12-04 NOTE — PROGRESS NOTES
HPI:    Patient ID: Jayne Cordoba is a 69year old female. HPI     H/o seropositive RA, GERD, allergic rhinitis, anxiety . C/o postnasal drip, cough ,choking sensation , hoarseness. Symptoms are worst at night. She eats on her bed . Denies dysphag FORMULA) Oral Tab Take  by mouth. One-A-Day Womens Formula 18 mg iron-400 mcg-500 mg Ca tablet       Allergies:No Known Allergies   PHYSICAL EXAM:   Physical Exam    Constitutional: She is oriented to person, place, and time. No distress.    HENT:   Head: N Placed This Encounter      Fluzone High Dose 65 yr and up [58362]      Pneumococcal (Prevnar 13) (36628) (DX V03.82/Z23)      Meds This Visit:  Requested Prescriptions     Signed Prescriptions Disp Refills   • folic acid 1 MG Oral Tab 90 tablet 3     Sig:

## 2019-12-04 NOTE — PROGRESS NOTES
Flu shot administered in right arm IM  Patient denies allergy to eggs, flu shot before, being sick today, diagnosed with Guillain barre syndrome. Patient tolerated well no reaction at this time.   Date of Birth Verified   Ordering Dr. Adrianna Barnes

## 2019-12-06 NOTE — PATIENT INSTRUCTIONS
Healthy Diet and Regular Exercise  [employer] [facility] SW[employer] for making healthy food choices    Enjoy your food, but eat less. Fully enjoy your food when eating. Don’t eat while distracted and slow down. Avoid over sized portions.    Don’t

## 2020-01-11 NOTE — PROGRESS NOTES
Alberto Patel is a 23-year-old woman with CCP and RF positive rheumatoid arthritis, who I last saw June 12th of 2019. Her arthritis has generally done well on Enbrel weekly. She denies having had any flares. She wakes up with minutes of stiffness.   No swollen well on Enbrel alone. Her Rx was refilled. I'll see her back in 6 months. 2. Encounter for therapeutic drug monitoring. A new monitoring lab order was produced. 3. Left shoulder rotator cuff tendinitis. Much improved.   4.  Borderline diabetes, high chol

## 2020-01-15 ENCOUNTER — OFFICE VISIT (OUTPATIENT)
Dept: RHEUMATOLOGY | Facility: CLINIC | Age: 75
End: 2020-01-15
Payer: MEDICARE

## 2020-01-15 VITALS
DIASTOLIC BLOOD PRESSURE: 77 MMHG | HEART RATE: 80 BPM | BODY MASS INDEX: 31.99 KG/M2 | SYSTOLIC BLOOD PRESSURE: 145 MMHG | WEIGHT: 192 LBS | HEIGHT: 65 IN

## 2020-01-15 DIAGNOSIS — Z51.81 THERAPEUTIC DRUG MONITORING: ICD-10-CM

## 2020-01-15 DIAGNOSIS — M05.79 SEROPOSITIVE RHEUMATOID ARTHRITIS OF MULTIPLE SITES (HCC): Primary | ICD-10-CM

## 2020-01-15 PROCEDURE — 99214 OFFICE O/P EST MOD 30 MIN: CPT | Performed by: INTERNAL MEDICINE

## 2020-01-15 PROCEDURE — G0463 HOSPITAL OUTPT CLINIC VISIT: HCPCS | Performed by: INTERNAL MEDICINE

## 2020-02-26 ENCOUNTER — HOSPITAL ENCOUNTER (OUTPATIENT)
Dept: MAMMOGRAPHY | Facility: HOSPITAL | Age: 75
Discharge: HOME | End: 2020-02-26
Attending: INTERNAL MEDICINE
Payer: MEDICARE

## 2020-02-26 DIAGNOSIS — Z12.39 SCREENING FOR BREAST CANCER: ICD-10-CM

## 2020-02-26 PROCEDURE — 77067 SCR MAMMO BI INCL CAD: CPT | Performed by: INTERNAL MEDICINE

## 2020-02-26 PROCEDURE — 77063 BREAST TOMOSYNTHESIS BI: CPT | Performed by: INTERNAL MEDICINE

## 2020-03-03 ENCOUNTER — HOSPITAL ENCOUNTER (OUTPATIENT)
Dept: ULTRASOUND IMAGING | Facility: HOSPITAL | Age: 75
Discharge: HOME | End: 2020-03-03
Attending: INTERNAL MEDICINE
Payer: MEDICARE

## 2020-03-03 ENCOUNTER — HOSPITAL ENCOUNTER (OUTPATIENT)
Dept: MAMMOGRAPHY | Facility: HOSPITAL | Age: 75
Discharge: HOME | End: 2020-03-03
Attending: INTERNAL MEDICINE
Payer: MEDICARE

## 2020-03-03 DIAGNOSIS — R92.8 ABNORMAL MAMMOGRAM: ICD-10-CM

## 2020-03-03 PROCEDURE — 77065 DX MAMMO INCL CAD UNI: CPT | Performed by: INTERNAL MEDICINE

## 2020-03-03 PROCEDURE — 76642 ULTRASOUND BREAST LIMITED: CPT | Performed by: INTERNAL MEDICINE

## 2020-03-03 PROCEDURE — 77061 BREAST TOMOSYNTHESIS UNI: CPT | Performed by: INTERNAL MEDICINE

## 2020-03-12 RX ORDER — ETANERCEPT 50 MG/ML
SOLUTION SUBCUTANEOUS
Qty: 12 ML | Refills: 0 | Status: SHIPPED | OUTPATIENT
Start: 2020-03-12 | End: 2020-06-12

## 2020-03-12 NOTE — TELEPHONE ENCOUNTER
Last filled: 11/26/19 #12 syringe with 0 refills   LOV: 1/15/2020  Future Appointments   Date Time Provider Willie Brandon   5/21/2020 11:20 AM Co, Lisha Ballard MD [facility]     Labs:   Component      Latest Ref Rng & Units 11/26/2019   WBC      4

## 2020-03-18 ENCOUNTER — TELEPHONE (OUTPATIENT)
Dept: RHEUMATOLOGY | Facility: CLINIC | Age: 75
End: 2020-03-18

## 2020-03-18 NOTE — TELEPHONE ENCOUNTER
Notification received from Todd. 75 not on list of covered drugs (formulary). Attempted to contact health plan at [phone]. Per automated message, system is experiencing technical issues, advised to call back at a later time.

## 2020-03-30 NOTE — TELEPHONE ENCOUNTER
System still experiencing technical difficulties - unable to speak with a representative. Spoke with pt to update - she has not had any issues filling her Enbrel.  She will contact pharmacy and health plan to follow up and will call our office back if osman

## 2020-04-12 ENCOUNTER — PATIENT OUTREACH (OUTPATIENT)
Dept: INTERNAL MEDICINE CLINIC | Facility: CLINIC | Age: 75
End: 2020-04-12

## 2020-04-14 ENCOUNTER — TELEPHONE (OUTPATIENT)
Dept: RHEUMATOLOGY | Facility: CLINIC | Age: 75
End: 2020-04-14

## 2020-04-14 NOTE — TELEPHONE ENCOUNTER
Pt called for status of medication. I advised of prior authorization. Pt stts if we can call [employer] for PA at [phone].  Please advise

## 2020-04-14 NOTE — TELEPHONE ENCOUNTER
Kate from [facility] requesting assistance to obtain a prior authorization for patient's medication.           ENBREL SURECLICK 50 MG/ML Subcutaneous Solution Auto-injector 12 mL 0 3/12/2020     Sig: INJECT 50 MG INTO THE SKIN ONCE A WEEK    Sent to pharmacy as

## 2020-04-14 NOTE — TELEPHONE ENCOUNTER
Contacted health plan and PA started over the phone. PA approved through 4/14/2021. Auth # [id number]. Left message informing douglas Reid notified via fax.

## 2020-05-06 ENCOUNTER — LAB ENCOUNTER (OUTPATIENT)
Dept: LAB | Facility: HOSPITAL | Age: 75
End: 2020-05-06
Attending: INTERNAL MEDICINE
Payer: MEDICARE

## 2020-05-06 DIAGNOSIS — M05.79 SEROPOSITIVE RHEUMATOID ARTHRITIS OF MULTIPLE SITES (HCC): ICD-10-CM

## 2020-05-06 DIAGNOSIS — Z51.81 THERAPEUTIC DRUG MONITORING: ICD-10-CM

## 2020-05-06 PROCEDURE — 85652 RBC SED RATE AUTOMATED: CPT

## 2020-05-06 PROCEDURE — 84450 TRANSFERASE (AST) (SGOT): CPT

## 2020-05-06 PROCEDURE — 85025 COMPLETE CBC W/AUTO DIFF WBC: CPT

## 2020-05-06 PROCEDURE — 86140 C-REACTIVE PROTEIN: CPT

## 2020-05-06 PROCEDURE — 36415 COLL VENOUS BLD VENIPUNCTURE: CPT

## 2020-05-06 PROCEDURE — 82565 ASSAY OF CREATININE: CPT

## 2020-05-06 PROCEDURE — 82040 ASSAY OF SERUM ALBUMIN: CPT

## 2020-05-12 ENCOUNTER — TELEMEDICINE (OUTPATIENT)
Dept: INTERNAL MEDICINE CLINIC | Facility: CLINIC | Age: 75
End: 2020-05-12
Payer: MEDICARE

## 2020-05-12 DIAGNOSIS — Z12.11 COLON CANCER SCREENING: ICD-10-CM

## 2020-05-12 DIAGNOSIS — N60.02 BREAST CYST, LEFT: ICD-10-CM

## 2020-05-12 DIAGNOSIS — N64.4 MASTODYNIA OF LEFT BREAST: Primary | ICD-10-CM

## 2020-05-12 DIAGNOSIS — M05.79 SEROPOSITIVE RHEUMATOID ARTHRITIS OF MULTIPLE SITES (HCC): ICD-10-CM

## 2020-05-12 PROBLEM — F40.240 CLAUSTROPHOBIA: Status: ACTIVE | Noted: 2020-05-12

## 2020-05-12 PROCEDURE — 99213 OFFICE O/P EST LOW 20 MIN: CPT | Performed by: INTERNAL MEDICINE

## 2020-05-13 NOTE — PROGRESS NOTES
Virtual video audio [address] verbally consents to a interactive video-audio visit. on 05/13/20. Patient understands and accepts financial responsibility for any deductible, co-insurance and/or co-pays associated with this service. density. FINDINGS:        There is a small faint focal asymmetry in the posterior upper outer left breast.  There is no suspicious calcification or architectural distortion.   Targeted left breast ultrasound demonstrates a probably benign mildly comp Smoking status: Never Smoker      Smokeless tobacco: Never Used    Alcohol use: Yes      Alcohol/week: 0.0 standard drinks      Comment: social/occ    Drug use: No    Social History: Occ: [employer]. : yes. Children: yes  Exercise: walking.

## 2020-06-12 NOTE — TELEPHONE ENCOUNTER
LOV: 1/15/2020  Future Appointments   Date Time Provider Willie Brandon   6/26/2020 11:20 AM Co, Josette Hicks MD [facility]     Labs:     Component      Latest Ref Rng & Units 5/6/2020   WBC      4.0 - 11.0 x10(3) uL 5.5   RBC      3.80 - 5.30 x1

## 2020-06-26 ENCOUNTER — OFFICE VISIT (OUTPATIENT)
Dept: INTERNAL MEDICINE CLINIC | Facility: CLINIC | Age: 75
End: 2020-06-26
Payer: MEDICARE

## 2020-06-26 VITALS
TEMPERATURE: 97 F | OXYGEN SATURATION: 98 % | WEIGHT: 190 LBS | SYSTOLIC BLOOD PRESSURE: 126 MMHG | DIASTOLIC BLOOD PRESSURE: 60 MMHG | HEIGHT: 65 IN | HEART RATE: 75 BPM | BODY MASS INDEX: 31.65 KG/M2 | RESPIRATION RATE: 19 BRPM

## 2020-06-26 DIAGNOSIS — E78.2 MIXED HYPERLIPIDEMIA: ICD-10-CM

## 2020-06-26 DIAGNOSIS — N60.02 BREAST CYST, LEFT: ICD-10-CM

## 2020-06-26 DIAGNOSIS — Z12.11 SCREEN FOR COLON CANCER: ICD-10-CM

## 2020-06-26 DIAGNOSIS — F41.9 ANXIETY: ICD-10-CM

## 2020-06-26 DIAGNOSIS — H93.12 TINNITUS OF LEFT EAR: ICD-10-CM

## 2020-06-26 DIAGNOSIS — R73.01 FASTING HYPERGLYCEMIA: ICD-10-CM

## 2020-06-26 DIAGNOSIS — M05.79 SEROPOSITIVE RHEUMATOID ARTHRITIS OF MULTIPLE SITES (HCC): ICD-10-CM

## 2020-06-26 DIAGNOSIS — Z00.00 ENCOUNTER FOR ANNUAL HEALTH EXAMINATION: Primary | ICD-10-CM

## 2020-06-26 PROCEDURE — 80061 LIPID PANEL: CPT | Performed by: INTERNAL MEDICINE

## 2020-06-26 PROCEDURE — G0439 PPPS, SUBSEQ VISIT: HCPCS | Performed by: INTERNAL MEDICINE

## 2020-06-26 PROCEDURE — 80053 COMPREHEN METABOLIC PANEL: CPT | Performed by: INTERNAL MEDICINE

## 2020-06-26 PROCEDURE — 99497 ADVNCD CARE PLAN 30 MIN: CPT | Performed by: INTERNAL MEDICINE

## 2020-06-26 PROCEDURE — 36415 COLL VENOUS BLD VENIPUNCTURE: CPT | Performed by: INTERNAL MEDICINE

## 2020-06-26 RX ORDER — LORAZEPAM 1 MG/1
1 TABLET ORAL AS NEEDED
Qty: 30 TABLET | Refills: 0 | Status: SHIPPED | OUTPATIENT
Start: 2020-06-26 | End: 2021-03-01

## 2020-06-26 NOTE — PROGRESS NOTES
HPI:   Maria Andres is a 69year old female who presents for a Medicare Subsequent Annual Wellness visit (Pt already had Initial Annual Wellness).     Patient has  seropositive rheumatoid arthritis, hyperlipidemia and anxiety disorder with claustroph She has never smoked tobacco.    CAGE Alcohol screening   Nereyda Harris was screened for Alcohol abuse and had a score of 0 so is at low risk.     Patient Care Team: Patient Care Team:  Christiano Chan MD as PCP - General (Internal Medicine)  Co, denies heartburn  : denies dysuria, vaginal discharge,or urine incontinence.    MUSCULOSKELETAL: occasional pain of wrist, knees  NEURO: denies headaches  PSYCHE: denies depression or anxiety  HEMATOLOGIC: denies hx of anemia  ENDOCRINE: denies thyroid hi four quadrants,  no masses, no organomegaly   Pelvic: Deferred   Extremities: Extremities normal, atraumatic, no cyanosis or edema. No joint deformities .    Pulses: 2+ and symmetric   Skin: Skin color, texture, turgor normal, no rashes or lesions   Lymph n 7 - 18 mg/dL  11    CREATININE      0.55 - 1.02 mg/dL  1.00 0.94   BUN/CREAT Ratio      10.0 - 20.0  11.0    CALCIUM      8.5 - 10.1 mg/dL  9.1    CALCULATED OSMOLALITY      275 - 295 mOsm/kg  291    eGFR NON-AFR.  AMERICAN      >=60  56 (L) 60   eGFR AFRI including reductions in dietary total and saturated fat, weight loss, aerobic exercise, and eating a diet rich in fruits and vegetables. -     COMP METABOLIC PANEL (14); Future  -     LIPID PANEL;  Future  -     Cancel: MIKE WALL (AUTOMATED) (FSB)Annually Glucose (mg/dL)   Date Value   06/26/2020 100 (H)          Cardiovascular Disease Screening     LDL Annually LDL Cholesterol (mg/dL)   Date Value   06/26/2020 151 (H)        EKG - w/ Initial Preventative Physical Exam only, or if medically ne concentrates   Clients of institutions for the mentally retarded   Persons who live in the same house as a HepB virus carrier   Homosexual men   Illicit injectable drug abusers     Tetanus Toxoid  Only covered with a cut with metal- TD and TDaP Not covered

## 2020-06-26 NOTE — PATIENT INSTRUCTIONS
Feliz Thayer's SCREENING SCHEDULE   Tests on this list are recommended by your physician but may not be covered, or covered at this frequency, by your insurer. Please check with your insurance carrier before scheduling to verify coverage.    Ruthie Bacon Covered every 10 years- more often if abnormal Colonoscopy due on 08/09/1996 Update [facility] if applicable    Flex Sigmoidoscopy Screen  Covered every 5 years No results found for this or any previous visit. No flowsheet data found.      Fecal O placed or performed in visit on 12/04/19   • PNEUMOCOCCAL VACC, 13 CANDY IM   Orders placed or performed in visit on 10/12/17   • PNEUMOCOCCAL VACC, 13 CANDY IM   Orders placed or performed in visit on 05/02/17   • PNEUMOCOCCAL VACC, 13 CANDY IM    Please get on

## 2020-06-26 NOTE — PROGRESS NOTES
Patient presented in office today for fasting blood draw. Blood draw successful in left arm  Adarsh:  cmp,lipid  D. O.B verified   Orders per Doctor Co

## 2020-07-01 RX ORDER — ATORVASTATIN CALCIUM 10 MG/1
10 TABLET, FILM COATED ORAL NIGHTLY
Qty: 90 TABLET | Refills: 1 | Status: SHIPPED | OUTPATIENT
Start: 2020-07-01 | End: 2021-07-27

## 2020-07-03 ENCOUNTER — TELEPHONE (OUTPATIENT)
Dept: RHEUMATOLOGY | Facility: CLINIC | Age: 75
End: 2020-07-03

## 2020-07-03 NOTE — TELEPHONE ENCOUNTER
Patient is asking if there is an alternative medication for Enbrel. She stated it is not helping her get out of bed.      Etanercept (ENBREL SURECLICK) 50 MG/ML Subcutaneous Solution Auto-injector

## 2020-07-06 NOTE — TELEPHONE ENCOUNTER
Spoke with pt states, she does not feel like the Enbrel Sureclick auto injection is fully being injection about half the time. So she states the medicine is only working half the time, because there have been a lot of problems with the auto injector not working and her not getting the full dose. She is asking if there is anything in pill form she can take that way she will consistently be getting the same dosage? Please advise.

## 2020-07-06 NOTE — TELEPHONE ENCOUNTER
Please schedule her for a follow-up appointment. She is due anyway. We can discuss the options. Thank you.

## 2020-07-07 ENCOUNTER — OFFICE VISIT (OUTPATIENT)
Dept: OTOLARYNGOLOGY | Facility: CLINIC | Age: 75
End: 2020-07-07
Payer: MEDICARE

## 2020-07-07 ENCOUNTER — OFFICE VISIT (OUTPATIENT)
Dept: AUDIOLOGY | Facility: CLINIC | Age: 75
End: 2020-07-07
Payer: MEDICARE

## 2020-07-07 VITALS
HEIGHT: 65 IN | DIASTOLIC BLOOD PRESSURE: 80 MMHG | SYSTOLIC BLOOD PRESSURE: 126 MMHG | TEMPERATURE: 99 F | WEIGHT: 190 LBS | BODY MASS INDEX: 31.65 KG/M2

## 2020-07-07 DIAGNOSIS — H93.12 TINNITUS, LEFT: Primary | ICD-10-CM

## 2020-07-07 DIAGNOSIS — H93.A2 PULSATILE TINNITUS OF LEFT EAR: Primary | ICD-10-CM

## 2020-07-07 PROCEDURE — 92567 TYMPANOMETRY: CPT | Performed by: AUDIOLOGIST

## 2020-07-07 PROCEDURE — G0463 HOSPITAL OUTPT CLINIC VISIT: HCPCS | Performed by: OTOLARYNGOLOGY

## 2020-07-07 PROCEDURE — 99203 OFFICE O/P NEW LOW 30 MIN: CPT | Performed by: OTOLARYNGOLOGY

## 2020-07-07 PROCEDURE — 92557 COMPREHENSIVE HEARING TEST: CPT | Performed by: AUDIOLOGIST

## 2020-07-07 RX ORDER — MONTELUKAST SODIUM 10 MG/1
10 TABLET ORAL NIGHTLY
Qty: 30 TABLET | Refills: 3 | Status: SHIPPED | OUTPATIENT
Start: 2020-07-07 | End: 2020-09-23

## 2020-07-07 RX ORDER — LORATADINE 10 MG/1
10 TABLET ORAL DAILY
Qty: 30 TABLET | Refills: 3 | Status: SHIPPED | OUTPATIENT
Start: 2020-07-07 | End: 2020-09-23

## 2020-07-07 RX ORDER — FLUTICASONE PROPIONATE 50 MCG
1 SPRAY, SUSPENSION (ML) NASAL 2 TIMES DAILY
Qty: 1 BOTTLE | Refills: 3 | Status: SHIPPED | OUTPATIENT
Start: 2020-07-07 | End: 2020-09-23

## 2020-07-07 NOTE — PROGRESS NOTES
Augusto Vazquez is a 69year old female.   Patient presents with:  Ear Problem: pt presents with ringing in left ear, hears a pulse sensation for 5 months       HISTORY OF PRESENT ILLNESS    She presents with a 5-month history of a pulsatile sensation in bleeding and easy bruising.            PHYSICAL EXAM    /80 (BP Location: Right arm, Patient Position: Sitting, Cuff Size: large)   Temp 98.6 °F (37 °C) (Tympanic)   Ht 5' 5\" (1.651 m)   Wt 190 lb (86.2 kg)   BMI 31.62 kg/m²        Constitutional Nor (ATIVAN) 1 MG Oral Tab, Take 1 tablet (1 mg total) by mouth as needed. , Disp: 30 tablet, Rfl: 0  •  Etanercept (ENBREL SURECLICK) 50 MG/ML Subcutaneous Solution Auto-injector, Inject 50 mg into the skin once a week., Disp: 12 mL, Rfl: 1  •  Multiple Vitami

## 2020-07-07 NOTE — PROGRESS NOTES
AUDIOGRAM     Myron Salinas was referred for testing by Gisele Echevarria for left sided tinnitus  8/9/1945  [id number]    Otoscopic inspection: right ear no cerumen; left ear no cerumen.        Tests/Procedures  Patient was tested via  standard insert sil

## 2020-07-12 NOTE — PROGRESS NOTES
Giles Rg is a 71-year-old woman with CCP and RF positive rheumatoid arthritis, who I last saw January 15th of 2020. She has continued Enbrel Sureclick weekly. Many of her injections do not go well. She does not get the proper dose.   There is malfunc flexion. Lymphadenopathy:     She has no cervical adenopathy. Neurological: She is alert and oriented to person, place, and time. Skin: No rash noted. Psychiatric: She has a normal mood and affect.      ASSESSMENT/PLAN:     1. CCP and RF positive Ra

## 2020-07-15 ENCOUNTER — TELEPHONE (OUTPATIENT)
Dept: RHEUMATOLOGY | Facility: CLINIC | Age: 75
End: 2020-07-15

## 2020-07-15 ENCOUNTER — OFFICE VISIT (OUTPATIENT)
Dept: RHEUMATOLOGY | Facility: CLINIC | Age: 75
End: 2020-07-15
Payer: MEDICARE

## 2020-07-15 ENCOUNTER — APPOINTMENT (OUTPATIENT)
Dept: LAB | Age: 75
End: 2020-07-15
Attending: INTERNAL MEDICINE
Payer: MEDICARE

## 2020-07-15 VITALS
SYSTOLIC BLOOD PRESSURE: 133 MMHG | HEIGHT: 65 IN | WEIGHT: 190 LBS | HEART RATE: 84 BPM | BODY MASS INDEX: 31.65 KG/M2 | DIASTOLIC BLOOD PRESSURE: 78 MMHG

## 2020-07-15 DIAGNOSIS — M05.79 SEROPOSITIVE RHEUMATOID ARTHRITIS OF MULTIPLE SITES (HCC): ICD-10-CM

## 2020-07-15 DIAGNOSIS — Z51.81 THERAPEUTIC DRUG MONITORING: ICD-10-CM

## 2020-07-15 DIAGNOSIS — M05.79 SEROPOSITIVE RHEUMATOID ARTHRITIS OF MULTIPLE SITES (HCC): Primary | ICD-10-CM

## 2020-07-15 PROCEDURE — 36415 COLL VENOUS BLD VENIPUNCTURE: CPT

## 2020-07-15 PROCEDURE — G0463 HOSPITAL OUTPT CLINIC VISIT: HCPCS | Performed by: INTERNAL MEDICINE

## 2020-07-15 PROCEDURE — 99214 OFFICE O/P EST MOD 30 MIN: CPT | Performed by: INTERNAL MEDICINE

## 2020-07-15 PROCEDURE — 86480 TB TEST CELL IMMUN MEASURE: CPT

## 2020-07-17 ENCOUNTER — TELEPHONE (OUTPATIENT)
Dept: RHEUMATOLOGY | Facility: CLINIC | Age: 75
End: 2020-07-17

## 2020-07-17 LAB
M TB IFN-G CD4+ T-CELLS BLD-ACNC: 0.07 IU/ML
M TB TUBERC IFN-G BLD QL: NEGATIVE
M TB TUBERC IGNF/MITOGEN IGNF CONTROL: 9.39 IU/ML
QUANTIFERON TB1 MINUS NIL: 0 IU/ML
QUANTIFERON TB2 MINUS NIL: 0.01 IU/ML

## 2020-07-17 NOTE — TELEPHONE ENCOUNTER
Please let her know that her Quantiferon TB Gold test is negative. She is ready to be scheduled for her [facility] teaching. Thank you.

## 2020-07-20 NOTE — TELEPHONE ENCOUNTER
Active 7/15/2020 Ish Oris 8/9/1945 Christine LACY Pending Pending benefits investigation None. [employer]             Awaiting PA information from JOSÉ MANUEL.        Sam Carrasco MD   Physician   RHEUMATOLOGY   Telephone Encounter   Signed   Creation

## 2020-07-20 NOTE — TELEPHONE ENCOUNTER
Please let her know that her Quantiferon TB Gold test is negative. She is ready to be scheduled for her [facility] teaching. Thank you.

## 2020-07-23 NOTE — TELEPHONE ENCOUNTER
Joanna Woods 8/9/1945 Humira  Pending Approved. We will be reaching out to patient to provide counseling and arrange free delivery. None.  [employer]

## 2020-07-28 NOTE — TELEPHONE ENCOUNTER
Spoke with patient and she completed her Humira injection on Saturday. She does not feel she needs an additional teaching. She is aware to have labs completed in 2 months and follow up appointment scheduled for 9/23.

## 2020-09-04 ENCOUNTER — HOSPITAL ENCOUNTER (OUTPATIENT)
Dept: MAMMOGRAPHY | Facility: HOSPITAL | Age: 75
Discharge: HOME | End: 2020-09-04
Attending: INTERNAL MEDICINE
Payer: MEDICARE

## 2020-09-04 DIAGNOSIS — N60.02 BREAST CYST, LEFT: ICD-10-CM

## 2020-09-04 PROCEDURE — 77065 DX MAMMO INCL CAD UNI: CPT | Performed by: INTERNAL MEDICINE

## 2020-09-04 PROCEDURE — 77061 BREAST TOMOSYNTHESIS UNI: CPT | Performed by: INTERNAL MEDICINE

## 2020-09-16 ENCOUNTER — LAB ENCOUNTER (OUTPATIENT)
Dept: LAB | Facility: HOSPITAL | Age: 75
End: 2020-09-16
Attending: INTERNAL MEDICINE
Payer: MEDICARE

## 2020-09-16 DIAGNOSIS — M05.79 SEROPOSITIVE RHEUMATOID ARTHRITIS OF MULTIPLE SITES (HCC): ICD-10-CM

## 2020-09-16 DIAGNOSIS — Z12.11 COLON CANCER SCREENING: ICD-10-CM

## 2020-09-16 DIAGNOSIS — R73.01 FASTING HYPERGLYCEMIA: ICD-10-CM

## 2020-09-16 DIAGNOSIS — E78.2 MIXED HYPERLIPIDEMIA: ICD-10-CM

## 2020-09-16 DIAGNOSIS — Z51.81 THERAPEUTIC DRUG MONITORING: ICD-10-CM

## 2020-09-16 LAB
ALBUMIN SERPL-MCNC: 3.8 G/DL (ref 3.4–5)
AST SERPL-CCNC: 17 U/L (ref 15–37)
BASOPHILS # BLD AUTO: 0.03 X10(3) UL (ref 0–0.2)
BASOPHILS NFR BLD AUTO: 0.5 %
CHOLEST SMN-MCNC: 156 MG/DL (ref ?–200)
CREAT BLD-MCNC: 1.03 MG/DL (ref 0.55–1.02)
CRP SERPL-MCNC: <0.29 MG/DL (ref ?–0.3)
DEPRECATED RDW RBC AUTO: 40 FL (ref 35.1–46.3)
EOSINOPHIL # BLD AUTO: 0.11 X10(3) UL (ref 0–0.7)
EOSINOPHIL NFR BLD AUTO: 1.8 %
ERYTHROCYTE [DISTWIDTH] IN BLOOD BY AUTOMATED COUNT: 15 % (ref 11–15)
ERYTHROCYTE [SEDIMENTATION RATE] IN BLOOD: 29 MM/HR (ref 0–30)
EST. AVERAGE GLUCOSE BLD GHB EST-MCNC: 123 MG/DL (ref 68–126)
HBA1C MFR BLD HPLC: 5.9 % (ref ?–5.7)
HCT VFR BLD AUTO: 38.8 % (ref 35–48)
HDLC SERPL-MCNC: 49 MG/DL (ref 40–59)
HGB BLD-MCNC: 12.4 G/DL (ref 12–16)
IMM GRANULOCYTES # BLD AUTO: 0.02 X10(3) UL (ref 0–1)
IMM GRANULOCYTES NFR BLD: 0.3 %
LDLC SERPL CALC-MCNC: 86 MG/DL (ref ?–100)
LYMPHOCYTES # BLD AUTO: 1.74 X10(3) UL (ref 1–4)
LYMPHOCYTES NFR BLD AUTO: 28.7 %
MCH RBC QN AUTO: 23.7 PG (ref 26–34)
MCHC RBC AUTO-ENTMCNC: 32 G/DL (ref 31–37)
MCV RBC AUTO: 74 FL (ref 80–100)
MONOCYTES # BLD AUTO: 0.53 X10(3) UL (ref 0.1–1)
MONOCYTES NFR BLD AUTO: 8.7 %
NEUTROPHILS # BLD AUTO: 3.64 X10 (3) UL (ref 1.5–7.7)
NEUTROPHILS # BLD AUTO: 3.64 X10(3) UL (ref 1.5–7.7)
NEUTROPHILS NFR BLD AUTO: 60 %
NONHDLC SERPL-MCNC: 107 MG/DL (ref ?–130)
PATIENT FASTING Y/N/NP: YES
PLATELET # BLD AUTO: 252 10(3)UL (ref 150–450)
RBC # BLD AUTO: 5.24 X10(6)UL (ref 3.8–5.3)
TRIGL SERPL-MCNC: 105 MG/DL (ref 30–149)
VLDLC SERPL CALC-MCNC: 21 MG/DL (ref 0–30)
WBC # BLD AUTO: 6.1 X10(3) UL (ref 4–11)

## 2020-09-16 PROCEDURE — 84450 TRANSFERASE (AST) (SGOT): CPT

## 2020-09-16 PROCEDURE — 85652 RBC SED RATE AUTOMATED: CPT

## 2020-09-16 PROCEDURE — 80061 LIPID PANEL: CPT

## 2020-09-16 PROCEDURE — 86140 C-REACTIVE PROTEIN: CPT

## 2020-09-16 PROCEDURE — 82040 ASSAY OF SERUM ALBUMIN: CPT

## 2020-09-16 PROCEDURE — 36415 COLL VENOUS BLD VENIPUNCTURE: CPT

## 2020-09-16 PROCEDURE — 83036 HEMOGLOBIN GLYCOSYLATED A1C: CPT

## 2020-09-16 PROCEDURE — 85025 COMPLETE CBC W/AUTO DIFF WBC: CPT

## 2020-09-16 PROCEDURE — 82565 ASSAY OF CREATININE: CPT

## 2020-09-17 NOTE — PROGRESS NOTES
Yaniv Young is a 18-year-old woman with CCP and RF positive rheumatoid arthritis, who I last saw July 15th of 2020. When I last saw her July 15h of 2020, she was doing poorly on Enbrel Sureclick weekly.   Many of her injections wouldn't go well, and she d rash noted. Psychiatric: She has a normal mood and affect. ASSESSMENT/PLAN:     1. CCP and RF positive Ra (rheumatoid arthritis). Since switching from Enbrel to Humira 40 mg subcutaneously every 14 days, she is much improved. She is pleased. She is

## 2020-09-23 ENCOUNTER — OFFICE VISIT (OUTPATIENT)
Dept: RHEUMATOLOGY | Facility: CLINIC | Age: 75
End: 2020-09-23
Payer: MEDICARE

## 2020-09-23 VITALS
BODY MASS INDEX: 31.82 KG/M2 | HEIGHT: 65 IN | DIASTOLIC BLOOD PRESSURE: 84 MMHG | SYSTOLIC BLOOD PRESSURE: 133 MMHG | HEART RATE: 80 BPM | WEIGHT: 191 LBS

## 2020-09-23 DIAGNOSIS — Z51.81 THERAPEUTIC DRUG MONITORING: ICD-10-CM

## 2020-09-23 DIAGNOSIS — M05.79 SEROPOSITIVE RHEUMATOID ARTHRITIS OF MULTIPLE SITES (HCC): Primary | ICD-10-CM

## 2020-09-23 PROCEDURE — 99214 OFFICE O/P EST MOD 30 MIN: CPT | Performed by: INTERNAL MEDICINE

## 2020-09-23 PROCEDURE — G0463 HOSPITAL OUTPT CLINIC VISIT: HCPCS | Performed by: INTERNAL MEDICINE

## 2020-09-23 RX ORDER — ADALIMUMAB 40MG/0.4ML
40 KIT SUBCUTANEOUS
COMMUNITY
Start: 2020-07-22 | End: 2021-01-04

## 2020-10-06 ENCOUNTER — OFFICE VISIT (OUTPATIENT)
Dept: INTERNAL MEDICINE CLINIC | Facility: CLINIC | Age: 75
End: 2020-10-06
Payer: MEDICARE

## 2020-10-06 VITALS
SYSTOLIC BLOOD PRESSURE: 125 MMHG | WEIGHT: 191 LBS | DIASTOLIC BLOOD PRESSURE: 75 MMHG | HEART RATE: 88 BPM | BODY MASS INDEX: 31.82 KG/M2 | HEIGHT: 65 IN | OXYGEN SATURATION: 97 %

## 2020-10-06 DIAGNOSIS — Z12.11 COLON CANCER SCREENING: ICD-10-CM

## 2020-10-06 DIAGNOSIS — M54.41 CHRONIC RIGHT-SIDED LOW BACK PAIN WITH RIGHT-SIDED SCIATICA: Primary | ICD-10-CM

## 2020-10-06 DIAGNOSIS — R73.01 FASTING HYPERGLYCEMIA: ICD-10-CM

## 2020-10-06 DIAGNOSIS — Z23 NEEDS FLU SHOT: ICD-10-CM

## 2020-10-06 DIAGNOSIS — M05.79 SEROPOSITIVE RHEUMATOID ARTHRITIS OF MULTIPLE SITES (HCC): ICD-10-CM

## 2020-10-06 DIAGNOSIS — G89.29 CHRONIC RIGHT-SIDED LOW BACK PAIN WITH RIGHT-SIDED SCIATICA: Primary | ICD-10-CM

## 2020-10-06 PROCEDURE — 90662 IIV NO PRSV INCREASED AG IM: CPT | Performed by: INTERNAL MEDICINE

## 2020-10-06 PROCEDURE — 99213 OFFICE O/P EST LOW 20 MIN: CPT | Performed by: INTERNAL MEDICINE

## 2020-10-06 PROCEDURE — G0008 ADMIN INFLUENZA VIRUS VAC: HCPCS | Performed by: INTERNAL MEDICINE

## 2020-10-06 RX ORDER — METHYLPREDNISOLONE 4 MG/1
TABLET ORAL
Qty: 1 KIT | Refills: 0 | Status: SHIPPED | OUTPATIENT
Start: 2020-10-06 | End: 2020-12-17 | Stop reason: ALTCHOICE

## 2020-10-06 RX ORDER — TRAMADOL HYDROCHLORIDE 50 MG/1
50 TABLET ORAL EVERY 6 HOURS PRN
Qty: 30 TABLET | Refills: 1 | Status: SHIPPED | OUTPATIENT
Start: 2020-10-06 | End: 2020-12-19 | Stop reason: ALTCHOICE

## 2020-10-06 NOTE — PROGRESS NOTES
HPI:    Patient ID: Willy Black is a 77year old female. Low Back Pain  This is a recurrent problem. The current episode started 1 to 4 weeks ago. The problem occurs intermittently. The problem has been gradually worsening since onset.  The pain is Subcutaneous Pen-injector Kit Inject 40 mg into the skin every 14 (fourteen) days. • atorvastatin 10 MG Oral Tab Take 1 tablet (10 mg total) by mouth nightly.  90 tablet 1   • LORazepam (ATIVAN) 1 MG Oral Tab Take 1 tablet (1 mg total) by mouth as neede Dr. Nika Resendiz for colonoscopy.      5. Fluvaccine given       Orders Placed This Encounter      Fluzone High Dose 65 yr and older PFS [31882]      Meds This Visit:  Requested Prescriptions     Signed Prescriptions Disp Refills   • methylPREDNISolone (MEDROL) 4

## 2020-12-15 ENCOUNTER — LAB ENCOUNTER (OUTPATIENT)
Dept: LAB | Facility: HOSPITAL | Age: 75
End: 2020-12-15
Attending: INTERNAL MEDICINE
Payer: MEDICARE

## 2020-12-15 DIAGNOSIS — M05.79 SEROPOSITIVE RHEUMATOID ARTHRITIS OF MULTIPLE SITES (HCC): ICD-10-CM

## 2020-12-15 DIAGNOSIS — Z51.81 THERAPEUTIC DRUG MONITORING: ICD-10-CM

## 2020-12-15 PROCEDURE — 85025 COMPLETE CBC W/AUTO DIFF WBC: CPT

## 2020-12-15 PROCEDURE — 85652 RBC SED RATE AUTOMATED: CPT

## 2020-12-15 PROCEDURE — 82565 ASSAY OF CREATININE: CPT

## 2020-12-15 PROCEDURE — 82040 ASSAY OF SERUM ALBUMIN: CPT

## 2020-12-15 PROCEDURE — 36415 COLL VENOUS BLD VENIPUNCTURE: CPT

## 2020-12-15 PROCEDURE — 86140 C-REACTIVE PROTEIN: CPT

## 2020-12-15 PROCEDURE — 84450 TRANSFERASE (AST) (SGOT): CPT

## 2020-12-17 ENCOUNTER — OFFICE VISIT (OUTPATIENT)
Dept: INTERNAL MEDICINE CLINIC | Facility: CLINIC | Age: 75
End: 2020-12-17
Payer: MEDICARE

## 2020-12-17 VITALS
BODY MASS INDEX: 31.99 KG/M2 | DIASTOLIC BLOOD PRESSURE: 76 MMHG | OXYGEN SATURATION: 98 % | HEART RATE: 89 BPM | SYSTOLIC BLOOD PRESSURE: 114 MMHG | WEIGHT: 192 LBS | HEIGHT: 65 IN

## 2020-12-17 DIAGNOSIS — M05.79 SEROPOSITIVE RHEUMATOID ARTHRITIS OF MULTIPLE SITES (HCC): ICD-10-CM

## 2020-12-17 DIAGNOSIS — E78.2 MIXED HYPERLIPIDEMIA: ICD-10-CM

## 2020-12-17 DIAGNOSIS — R06.83 SNORES: ICD-10-CM

## 2020-12-17 DIAGNOSIS — R42 DIZZINESS: Primary | ICD-10-CM

## 2020-12-17 DIAGNOSIS — Z12.11 COLON CANCER SCREENING: ICD-10-CM

## 2020-12-17 DIAGNOSIS — N32.81 OVERACTIVE BLADDER: ICD-10-CM

## 2020-12-17 PROCEDURE — 99214 OFFICE O/P EST MOD 30 MIN: CPT | Performed by: INTERNAL MEDICINE

## 2020-12-17 RX ORDER — MECLIZINE HCL 12.5 MG/1
TABLET ORAL
Qty: 20 TABLET | Refills: 0 | Status: SHIPPED | OUTPATIENT
Start: 2020-12-17 | End: 2021-02-11 | Stop reason: ALTCHOICE

## 2020-12-17 RX ORDER — TOLTERODINE 4 MG/1
4 CAPSULE, EXTENDED RELEASE ORAL DAILY
Qty: 90 CAPSULE | Refills: 1 | Status: ON HOLD | OUTPATIENT
Start: 2020-12-17 | End: 2021-08-24

## 2020-12-17 NOTE — PROGRESS NOTES
HPI:    Patient ID: Doroteo Crigler is a 77year old female. HPI     H/o seropositive RA,obesity , HLD. C/o problem with balancing in the last 2 weeks. Felt wabbly.  She fell while getting in to bathtub, hitting her shoulder and upper back  against mg into the skin every 14 (fourteen) days. • atorvastatin 10 MG Oral Tab Take 1 tablet (10 mg total) by mouth nightly. 90 tablet 1   • LORazepam (ATIVAN) 1 MG Oral Tab Take 1 tablet (1 mg total) by mouth as needed.  30 tablet 0   • Multiple Vitamins-Morteza by mouth every 6 (six) hours as needed for Pain. 30 tablet 1   • HUMIRA PEN 40 MG/0.4ML Subcutaneous Pen-injector Kit Inject 40 mg into the skin every 14 (fourteen) days. • atorvastatin 10 MG Oral Tab Take 1 tablet (10 mg total) by mouth nightly.  90 ta ([address]  ON Humira. Dr. Reinaldo Ulloa following     4. Snores  Sleep eval by Dr. Jose Orr   - [facility]    5. Mixed hyperlipidemia  Atorvastatin 10 mgs night. 6. Colon cancer screen  Fit test kit given again ( she lost last one given).    No orders

## 2020-12-19 PROBLEM — E78.2 MIXED HYPERLIPIDEMIA: Status: ACTIVE | Noted: 2020-12-19

## 2020-12-19 PROBLEM — R06.83 SNORES: Status: ACTIVE | Noted: 2020-12-19

## 2020-12-19 PROBLEM — N32.81 OVERACTIVE BLADDER: Status: ACTIVE | Noted: 2020-12-19

## 2020-12-19 NOTE — PATIENT INSTRUCTIONS
Fall Prevention  Falls often occur due to slipping, tripping or losing your balance. Millions of people fall every year and injure themselves. Here are ways to reduce your risk of falling again.    · Think about your fall, was there anything that caused y · If you have pets, know where they are before you stand up or walk so you don't trip over them. · Use night lights. · Go over all your medicines with a pharmacist or other healthcare provider to see if any of them could make you more likely to fall.   St

## 2020-12-28 NOTE — PROGRESS NOTES
Chet Lopez is a 29-year-old woman with CCP and RF positive rheumatoid arthritis, who I last saw September 23rd of 2020. July of 2020 she was doing poorly on Enbrel Sureclick weekly.   Many of her injections wouldn't go well, and she did not get the prope motion is severely limited, with pain Left shoulder motion is also limited with pain. Lawrence Hernandez Her right knee is swollen, and flexion is very limited and painful. Lymphadenopathy:     She has no cervical adenopathy.    Neurological: She is alert and oriented to p

## 2020-12-30 ENCOUNTER — TELEPHONE (OUTPATIENT)
Dept: RHEUMATOLOGY | Facility: CLINIC | Age: 75
End: 2020-12-30

## 2020-12-30 ENCOUNTER — OFFICE VISIT (OUTPATIENT)
Dept: RHEUMATOLOGY | Facility: CLINIC | Age: 75
End: 2020-12-30
Payer: MEDICARE

## 2020-12-30 VITALS
DIASTOLIC BLOOD PRESSURE: 81 MMHG | WEIGHT: 191 LBS | SYSTOLIC BLOOD PRESSURE: 134 MMHG | HEART RATE: 84 BPM | HEIGHT: 65 IN | BODY MASS INDEX: 31.82 KG/M2

## 2020-12-30 DIAGNOSIS — Z51.81 THERAPEUTIC DRUG MONITORING: ICD-10-CM

## 2020-12-30 DIAGNOSIS — M05.79 SEROPOSITIVE RHEUMATOID ARTHRITIS OF MULTIPLE SITES (HCC): Primary | ICD-10-CM

## 2020-12-30 DIAGNOSIS — M25.511 ACUTE PAIN OF RIGHT SHOULDER: ICD-10-CM

## 2020-12-30 PROCEDURE — 99214 OFFICE O/P EST MOD 30 MIN: CPT | Performed by: INTERNAL MEDICINE

## 2020-12-30 PROCEDURE — 20610 DRAIN/INJ JOINT/BURSA W/O US: CPT | Performed by: INTERNAL MEDICINE

## 2020-12-30 RX ORDER — IBUPROFEN 600 MG/1
600 TABLET ORAL EVERY 8 HOURS PRN
Qty: 90 TABLET | Refills: 1 | Status: SHIPPED | OUTPATIENT
Start: 2020-12-30 | End: 2021-02-14 | Stop reason: ALTCHOICE

## 2020-12-30 RX ORDER — METHYLPREDNISOLONE ACETATE 40 MG/ML
40 INJECTION, SUSPENSION INTRA-ARTICULAR; INTRALESIONAL; INTRAMUSCULAR; SOFT TISSUE ONCE
Status: COMPLETED | OUTPATIENT
Start: 2020-12-30 | End: 2020-12-30

## 2021-01-04 RX ORDER — ADALIMUMAB 40MG/0.4ML
40 KIT SUBCUTANEOUS
Qty: 4 EACH | Refills: 5 | Status: SHIPPED | OUTPATIENT
Start: 2021-01-04 | End: 2021-01-06

## 2021-01-04 NOTE — TELEPHONE ENCOUNTER
Unable to reach insurance to complete PA request. Please approve script to pharmacy to initiate PA request.

## 2021-01-06 ENCOUNTER — TELEPHONE (OUTPATIENT)
Dept: RHEUMATOLOGY | Facility: CLINIC | Age: 76
End: 2021-01-06

## 2021-01-06 RX ORDER — ADALIMUMAB 40MG/0.4ML
40 KIT SUBCUTANEOUS
Qty: 4 EACH | Refills: 5 | Status: SHIPPED | OUTPATIENT
Start: 2021-01-06 | End: 2021-01-07

## 2021-01-06 NOTE — TELEPHONE ENCOUNTER
Zofia Eaton, states that the patient informed them that the doctor switched her to weekly injections from every other weeks for Humira. . Wes,would like to know if a script can be faxed to them at [phone] to reflect the change.

## 2021-01-07 ENCOUNTER — TELEPHONE (OUTPATIENT)
Dept: RHEUMATOLOGY | Facility: CLINIC | Age: 76
End: 2021-01-07

## 2021-01-07 RX ORDER — ADALIMUMAB 40MG/0.4ML
40 KIT SUBCUTANEOUS
Qty: 12 EACH | Refills: 1 | Status: SHIPPED | OUTPATIENT
Start: 2021-01-07 | End: 2021-06-08

## 2021-01-07 NOTE — TELEPHONE ENCOUNTER
Patient calling and states she need a new prescription  Humira for 90 day supply Quantity only for insurance purpose it is cheaper         HUMIRA PEN 40 MG/0.4ML Subcutaneous Pen-injector Kit      Please advise [phone]

## 2021-01-26 ENCOUNTER — PATIENT MESSAGE (OUTPATIENT)
Dept: RHEUMATOLOGY | Facility: CLINIC | Age: 76
End: 2021-01-26

## 2021-01-26 RX ORDER — PREDNISONE 1 MG/1
TABLET ORAL
Qty: 42 TABLET | Refills: 0 | Status: SHIPPED | OUTPATIENT
Start: 2021-01-26 | End: 2021-06-15

## 2021-01-26 NOTE — TELEPHONE ENCOUNTER
From: Kana Nagy  To: Dixon Walden MD  Sent: 1/26/2021 11:28 AM CST  Subject: Non-Urgent Medical Question    Good morning Dr. Ava Boo, I have been suffering from bursitis in my right shoulder for three days.  I have taken ibuprofen but the pa

## 2021-01-26 NOTE — TELEPHONE ENCOUNTER
Pt contacted and advised script sent to pharmacy. Prednisone \"burst\" directions reviewed with patient. Pt will call office back with any questions or concerns.

## 2021-02-12 ENCOUNTER — VIRTUAL PHONE E/M (OUTPATIENT)
Dept: INTERNAL MEDICINE CLINIC | Facility: CLINIC | Age: 76
End: 2021-02-12
Payer: MEDICARE

## 2021-02-12 DIAGNOSIS — K21.9 GASTROESOPHAGEAL REFLUX DISEASE, UNSPECIFIED WHETHER ESOPHAGITIS PRESENT: ICD-10-CM

## 2021-02-12 DIAGNOSIS — J30.1 SEASONAL ALLERGIC RHINITIS DUE TO POLLEN: Primary | ICD-10-CM

## 2021-02-12 PROCEDURE — 99998 NO SHOW: CPT | Performed by: INTERNAL MEDICINE

## 2021-02-12 PROCEDURE — 99442 PHONE E/M BY PHYS 11-20 MIN: CPT | Performed by: INTERNAL MEDICINE

## 2021-02-12 RX ORDER — NICOTINE POLACRILEX 4 MG/1
1 GUM, CHEWING ORAL DAILY
Qty: 30 TABLET | Refills: 1 | Status: SHIPPED | OUTPATIENT
Start: 2021-02-12 | End: 2021-03-14

## 2021-02-12 RX ORDER — FLUTICASONE PROPIONATE 50 MCG
2 SPRAY, SUSPENSION (ML) NASAL DAILY
Qty: 3 BOTTLE | Refills: 3 | Status: SHIPPED | OUTPATIENT
Start: 2021-02-12 | End: 2021-08-23

## 2021-02-12 NOTE — PROGRESS NOTES
Virtual/Telephone Check-In    Hayde Salvador verbally consents to   HPI:    Patient ID: Hayde Salvador is a 77year old female. Virtual/Telephone Check-In    Hayde Salvador verbally consents to a Virtual/Telephone Check-In service on 02/12/21. One-A-Day Womens Formula 18 mg iron-400 mcg-500 mg Ca tablet       Allergies:No Known Allergies    HISTORY:  Past Medical History:   Diagnosis Date   • Rheumatoid arthritis ([facility] [address]    • TIA (transient ischemic attack)       Past Surgical History:   Procedure total) by mouth daily. 90 capsule 1   • atorvastatin 10 MG Oral Tab Take 1 tablet (10 mg total) by mouth nightly. 90 tablet 1   • LORazepam (ATIVAN) 1 MG Oral Tab Take 1 tablet (1 mg total) by mouth as needed.  30 tablet 0   • Multiple Vitamins-Calcium (ONE

## 2021-02-14 NOTE — PATIENT INSTRUCTIONS
Lifestyle Changes for Controlling GERD  When you have GERD, stomach acid feels as if it’s backing up toward your mouth. Making lifestyle changes can often improve your symptoms. This is true if you take medicine to control your GERD or not.  Talk with you professional's instructions.

## 2021-03-02 RX ORDER — LORAZEPAM 1 MG/1
1 TABLET ORAL AS NEEDED
Qty: 30 TABLET | Refills: 0 | Status: SHIPPED | OUTPATIENT
Start: 2021-03-02 | End: 2021-06-15

## 2021-03-09 DIAGNOSIS — Z23 NEED FOR VACCINATION: ICD-10-CM

## 2021-03-15 ENCOUNTER — TELEPHONE (OUTPATIENT)
Dept: RHEUMATOLOGY | Facility: CLINIC | Age: 76
End: 2021-03-15

## 2021-04-02 ENCOUNTER — PATIENT MESSAGE (OUTPATIENT)
Dept: INTERNAL MEDICINE CLINIC | Facility: CLINIC | Age: 76
End: 2021-04-02

## 2021-04-05 RX ORDER — BENZONATATE 200 MG/1
200 CAPSULE ORAL 3 TIMES DAILY PRN
Qty: 30 CAPSULE | Refills: 1 | Status: SHIPPED | OUTPATIENT
Start: 2021-04-05 | End: 2021-07-27

## 2021-04-06 NOTE — TELEPHONE ENCOUNTER
FYI: Pfizer vaccinations retrieved from [employer]. Abstracted into immunization records.

## 2021-04-12 ENCOUNTER — TELEPHONE (OUTPATIENT)
Dept: RHEUMATOLOGY | Facility: CLINIC | Age: 76
End: 2021-04-12

## 2021-04-12 ENCOUNTER — LAB ENCOUNTER (OUTPATIENT)
Dept: LAB | Facility: HOSPITAL | Age: 76
End: 2021-04-12
Attending: INTERNAL MEDICINE
Payer: MEDICARE

## 2021-04-12 DIAGNOSIS — Z51.81 THERAPEUTIC DRUG MONITORING: ICD-10-CM

## 2021-04-12 DIAGNOSIS — M05.79 SEROPOSITIVE RHEUMATOID ARTHRITIS OF MULTIPLE SITES (HCC): Primary | ICD-10-CM

## 2021-04-12 NOTE — TELEPHONE ENCOUNTER
Patient is requesting a new standing lab orders. Patient was advised today by the lab that the old standing order has .

## 2021-04-13 ENCOUNTER — PATIENT MESSAGE (OUTPATIENT)
Dept: RHEUMATOLOGY | Facility: CLINIC | Age: 76
End: 2021-04-13

## 2021-04-13 ENCOUNTER — PATIENT MESSAGE (OUTPATIENT)
Dept: INTERNAL MEDICINE CLINIC | Facility: CLINIC | Age: 76
End: 2021-04-13

## 2021-04-14 NOTE — TELEPHONE ENCOUNTER
From: Jameel Modi  To: Ian Mcgraw MD  Sent: 2021 7:39 PM CDT  Subject: Other    Good Afternoon, my standing order has . Please send an updated order to [facility]. Thank You.

## 2021-04-14 NOTE — TELEPHONE ENCOUNTER
From: Susan Guadalupe  To: Ivis Leonard MD  Sent: 4/13/2021 7:33 PM CDT  Subject: Prescription Question    Good Afternoon Dr. Marcell Vee, the cough medicine you prescribed is not working for me.  I purchased a humidifier and I suck on cough drops all day but not

## 2021-04-14 NOTE — TELEPHONE ENCOUNTER
Caleb Willson MD  to Anthony Ryan, [address] 6:15 PM  Have you had Covid vaccination? I am going to send you benzonatate for cough. Virtussin AC Ihas narcotic. Deisy Band  Please follow up if not better.      Dr. Jayce Alcantara

## 2021-04-14 NOTE — TELEPHONE ENCOUNTER
Telemedicine appt scheduled per MD last note that pt should f/up if not better. Pt notified in [address]

## 2021-04-15 ENCOUNTER — VIRTUAL PHONE E/M (OUTPATIENT)
Dept: INTERNAL MEDICINE CLINIC | Facility: CLINIC | Age: 76
End: 2021-04-15
Payer: COMMERCIAL

## 2021-04-15 DIAGNOSIS — J45.21 MILD INTERMITTENT REACTIVE AIRWAY DISEASE WITH ACUTE EXACERBATION: ICD-10-CM

## 2021-04-15 DIAGNOSIS — R05.9 COUGH: Primary | ICD-10-CM

## 2021-04-15 PROCEDURE — 99213 OFFICE O/P EST LOW 20 MIN: CPT | Performed by: INTERNAL MEDICINE

## 2021-04-15 RX ORDER — ALBUTEROL SULFATE 90 UG/1
2 AEROSOL, METERED RESPIRATORY (INHALATION) EVERY 6 HOURS PRN
Qty: 18 G | Refills: 2 | Status: SHIPPED | OUTPATIENT
Start: 2021-04-15 | End: 2021-06-16

## 2021-04-15 NOTE — PROGRESS NOTES
HPI:    Patient ID: Patty Medrano is a 77year old female. Virtual/Telephone Check-In    Patty Medrano verbally consents to a Virtual/Telephone Check-In service on 04/15/21.   Patient has been referred to the [facility] website at [email or web address] Tolterodine Tartrate ER 4 MG Oral Capsule SR 24 Hr Take 1 capsule (4 mg total) by mouth daily. 90 capsule 1   • atorvastatin 10 MG Oral Tab Take 1 tablet (10 mg total) by mouth nightly.  90 tablet 1   • Multiple Vitamins-Calcium (ONE-A-DAY WOMENS FORMULA) O Fluticasone Propionate 50 MCG/ACT Nasal Suspension 2 sprays by Each Nare route daily.  3 Bottle 3   • predniSONE 5 MG Oral Tab Take 6 now, 6 Q AM x 1, 5 Q AM x 2, 4 Q AM x 2, 3 Q AM x 2, 2 Q AM x 2, 1 Q AM x 2, and then off. 42 tablet 0   • HUMIRA PEN 40 MG ASSESSMENT/PLAN:   1. Cough  XR to assess community acquired pneumonia   Mucinex 600 mgs BID prn  Steam inhalation therapy every day  Aspiration precaution   - XR CHEST PA + LAT CHEST (CPT=71046); Future    2.  Mild intermittent reactive airway disease wi

## 2021-04-16 ENCOUNTER — HOSPITAL ENCOUNTER (OUTPATIENT)
Dept: GENERAL RADIOLOGY | Facility: HOSPITAL | Age: 76
Discharge: HOME | End: 2021-04-16
Attending: INTERNAL MEDICINE
Payer: MEDICARE

## 2021-04-16 ENCOUNTER — LAB ENCOUNTER (OUTPATIENT)
Dept: LAB | Facility: HOSPITAL | Age: 76
End: 2021-04-16
Attending: INTERNAL MEDICINE
Payer: MEDICARE

## 2021-04-16 DIAGNOSIS — R05.9 COUGH: ICD-10-CM

## 2021-04-16 DIAGNOSIS — M05.79 SEROPOSITIVE RHEUMATOID ARTHRITIS OF MULTIPLE SITES (HCC): ICD-10-CM

## 2021-04-16 DIAGNOSIS — Z51.81 THERAPEUTIC DRUG MONITORING: ICD-10-CM

## 2021-04-16 PROCEDURE — 36415 COLL VENOUS BLD VENIPUNCTURE: CPT

## 2021-04-16 PROCEDURE — 85025 COMPLETE CBC W/AUTO DIFF WBC: CPT

## 2021-04-16 PROCEDURE — 86140 C-REACTIVE PROTEIN: CPT

## 2021-04-16 PROCEDURE — 82565 ASSAY OF CREATININE: CPT

## 2021-04-16 PROCEDURE — 82040 ASSAY OF SERUM ALBUMIN: CPT

## 2021-04-16 PROCEDURE — 84450 TRANSFERASE (AST) (SGOT): CPT

## 2021-04-16 PROCEDURE — 71046 X-RAY EXAM CHEST 2 VIEWS: CPT | Performed by: INTERNAL MEDICINE

## 2021-04-16 PROCEDURE — 85652 RBC SED RATE AUTOMATED: CPT

## 2021-05-19 ENCOUNTER — PATIENT MESSAGE (OUTPATIENT)
Dept: RHEUMATOLOGY | Facility: CLINIC | Age: 76
End: 2021-05-19

## 2021-05-20 RX ORDER — MELOXICAM 7.5 MG/1
7.5 TABLET ORAL DAILY
Qty: 30 TABLET | Refills: 2 | Status: SHIPPED | OUTPATIENT
Start: 2021-05-20 | End: 2021-08-23

## 2021-05-20 NOTE — TELEPHONE ENCOUNTER
From: Timothy Face  To: Favio Garcia MD  Sent: 5/19/2021 9:34 PM CDT  Subject: Other    I am leaving Tuesday for a three week stay in [facility]. My joints are constantly aching even though I am injecting my Humira medication every week.  Can yo

## 2021-05-20 NOTE — TELEPHONE ENCOUNTER
Dr. Kristina Fuller - Patient is having joint pain. Injecting hurmira weekly as prescribed. Patient is traveling out of town and would like a medication to decrease her joint pain. Please advise.      LOV: 12/30/2020

## 2021-06-08 RX ORDER — ADALIMUMAB 40MG/0.4ML
40 KIT SUBCUTANEOUS WEEKLY
Qty: 12 EACH | Refills: 1 | Status: SHIPPED | OUTPATIENT
Start: 2021-06-08 | End: 2021-06-22

## 2021-06-08 RX ORDER — ADALIMUMAB 40MG/0.4ML
KIT SUBCUTANEOUS
Qty: 12 EACH | Refills: 1 | Status: SHIPPED | OUTPATIENT
Start: 2021-06-08 | End: 2021-06-08

## 2021-06-08 NOTE — TELEPHONE ENCOUNTER
Last filled: 1/7/21 #12 each with 1 refill  LOV: 12/30/20  No future appointments. Labs: 4/16/21    Please advise.

## 2021-06-15 ENCOUNTER — OFFICE VISIT (OUTPATIENT)
Dept: INTERNAL MEDICINE CLINIC | Facility: CLINIC | Age: 76
End: 2021-06-15
Payer: COMMERCIAL

## 2021-06-15 VITALS
OXYGEN SATURATION: 96 % | HEIGHT: 65 IN | SYSTOLIC BLOOD PRESSURE: 128 MMHG | WEIGHT: 187 LBS | DIASTOLIC BLOOD PRESSURE: 83 MMHG | BODY MASS INDEX: 31.16 KG/M2 | HEART RATE: 79 BPM

## 2021-06-15 DIAGNOSIS — Z78.0 POSTMENOPAUSAL: ICD-10-CM

## 2021-06-15 DIAGNOSIS — G47.33 OSA (OBSTRUCTIVE SLEEP APNEA): ICD-10-CM

## 2021-06-15 DIAGNOSIS — R13.12 OROPHARYNGEAL DYSPHAGIA: ICD-10-CM

## 2021-06-15 DIAGNOSIS — F41.9 ANXIETY: ICD-10-CM

## 2021-06-15 DIAGNOSIS — R49.0 HOARSENESS: ICD-10-CM

## 2021-06-15 DIAGNOSIS — M54.50 ACUTE MIDLINE LOW BACK PAIN WITHOUT SCIATICA: Primary | ICD-10-CM

## 2021-06-15 PROCEDURE — 3074F SYST BP LT 130 MM HG: CPT | Performed by: INTERNAL MEDICINE

## 2021-06-15 PROCEDURE — 3079F DIAST BP 80-89 MM HG: CPT | Performed by: INTERNAL MEDICINE

## 2021-06-15 PROCEDURE — 99214 OFFICE O/P EST MOD 30 MIN: CPT | Performed by: INTERNAL MEDICINE

## 2021-06-15 PROCEDURE — 3008F BODY MASS INDEX DOCD: CPT | Performed by: INTERNAL MEDICINE

## 2021-06-15 RX ORDER — LORAZEPAM 1 MG/1
1 TABLET ORAL NIGHTLY
Qty: 30 TABLET | Refills: 1 | Status: ON HOLD | OUTPATIENT
Start: 2021-06-15 | End: 2021-09-17

## 2021-06-15 NOTE — PROGRESS NOTES
HPI:    Patient ID: Zofia Gerardo is a 77year old female. HPI     Chief complaints : Low back pain, sleep disorder    H/o RA, hyperlipidemia, anxiety, claustrophobia, obesity .       Zofia Gerardo is a 77year old year old right handed female wi Propionate 50 MCG/ACT Nasal Suspension 2 sprays by Each Nare route daily. 3 Bottle 3   • Tolterodine Tartrate ER 4 MG Oral Capsule SR 24 Hr Take 1 capsule (4 mg total) by mouth daily.  90 capsule 1   • atorvastatin 10 MG Oral Tab Take 1 tablet (10 mg total) 12 each 1   • Meloxicam 7.5 MG Oral Tab Take 1 tablet (7.5 mg total) by mouth daily. 30 tablet 2   • Albuterol Sulfate  (90 Base) MCG/ACT Inhalation Aero Soln Inhale 2 puffs into the lungs every 6 (six) hours as needed for Wheezing.  18 g 2   • benzo Heart sounds: Normal heart sounds. Pulmonary:      Effort: Pulmonary effort is normal.      Breath sounds: Normal breath sounds. Abdominal:      General: Abdomen is flat. Bowel sounds are normal. There is no distension.       Palpations: Abdomen is soft Disp Refills   • LORazepam (ATIVAN) 1 MG Oral Tab 30 tablet 1     Sig: Take 1 tablet (1 mg total) by mouth nightly prn          Orders Placed This Encounter      Pre-Procedure Covid-19 Testing by PCR Charles)      Meds This Visit:  Requested Prescriptions

## 2021-06-17 ENCOUNTER — TELEPHONE (OUTPATIENT)
Dept: RHEUMATOLOGY | Facility: CLINIC | Age: 76
End: 2021-06-17

## 2021-06-17 ENCOUNTER — OFFICE VISIT (OUTPATIENT)
Dept: OTOLARYNGOLOGY | Facility: CLINIC | Age: 76
End: 2021-06-17
Payer: MEDICARE

## 2021-06-17 VITALS
BODY MASS INDEX: 31.16 KG/M2 | SYSTOLIC BLOOD PRESSURE: 150 MMHG | HEIGHT: 65 IN | TEMPERATURE: 98 F | DIASTOLIC BLOOD PRESSURE: 79 MMHG | WEIGHT: 187 LBS | HEART RATE: 68 BPM

## 2021-06-17 DIAGNOSIS — R13.14 PHARYNGOESOPHAGEAL DYSPHAGIA: Primary | ICD-10-CM

## 2021-06-17 PROCEDURE — 99203 OFFICE O/P NEW LOW 30 MIN: CPT | Performed by: OTOLARYNGOLOGY

## 2021-06-17 RX ORDER — OMEPRAZOLE 20 MG/1
CAPSULE, DELAYED RELEASE ORAL
Status: ON HOLD | COMMUNITY
Start: 2021-02-12 | End: 2021-08-08

## 2021-06-17 RX ORDER — ALBUTEROL SULFATE 90 UG/1
2 AEROSOL, METERED RESPIRATORY (INHALATION) EVERY 6 HOURS PRN
Qty: 18 G | Refills: 0 | Status: SHIPPED | OUTPATIENT
Start: 2021-06-17 | End: 2022-08-24 | Stop reason: ALTCHOICE

## 2021-06-17 RX ORDER — OMEPRAZOLE 40 MG/1
40 CAPSULE, DELAYED RELEASE ORAL DAILY
Qty: 30 CAPSULE | Refills: 11 | Status: SHIPPED | OUTPATIENT
Start: 2021-06-17

## 2021-06-17 NOTE — TELEPHONE ENCOUNTER
Pt states that she is still having pain all over. Per the patient the humira medication is not working. Pt would like to know what else the doctor can prescribe or what the doctor would recommend for the pain.  Pt would like to speak with the nurse about th
Spoke to patient she reports she was on enbrel and had issues with the injection so switche dto Humira. However, Humira isn't working for her and was wondering if she could switch back to Enbrel.      Patient was informed to schedule a follow-up with Dr. Cherylene Boston
I have personally performed a face to face diagnostic evaluation on this patient. I have reviewed the ACP note and agree with the history, exam and plan of care, except as noted.

## 2021-06-17 NOTE — PROGRESS NOTES
Hayde Salvador is a 77year old female. Patient presents with:  Throat Problem: Difficulty swallowing, coughing      HISTORY OF PRESENT ILLNESS  6/17/2021  Patient   presents for evaluation of great fear of choking.   She states for about a year she has REVIEW OF SYSTEMS    System Neg/Pos Details   Constitutional Negative Fatigue, fever and weight loss. ENMT Negative Drooling. Eyes Negative Blurred vision and vision changes. Respiratory Negative Dyspnea and wheezing.    Cardio Negative Chest laryngoscopy performed with topical lidocaine and oxymetazoline. After discussing indication's and potential side effects. The patient stated that they understood and wished for me to proceed.  Topical pontocaine and neosynephrine was instilled into the 1  •  Multiple Vitamins-Calcium (ONE-A-DAY WOMENS FORMULA) Oral Tab, Take  by mouth.  One-A-Day Womens Formula 18 mg iron-400 mcg-500 mg Ca tablet, Disp: , Rfl:   •  benzonatate 200 MG Oral Cap, Take 1 capsule (200 mg total) by mouth 3 (three) times daily a

## 2021-06-22 ENCOUNTER — TELEPHONE (OUTPATIENT)
Dept: RHEUMATOLOGY | Facility: CLINIC | Age: 76
End: 2021-06-22

## 2021-06-22 RX ORDER — ADALIMUMAB 40MG/0.4ML
40 KIT SUBCUTANEOUS WEEKLY
Qty: 12 EACH | Refills: 1 | Status: SHIPPED | OUTPATIENT
Start: 2021-06-22 | End: 2021-07-27

## 2021-06-22 RX ORDER — ADALIMUMAB 40MG/0.4ML
40 KIT SUBCUTANEOUS WEEKLY
Qty: 12 EACH | Refills: 1 | Status: SHIPPED | OUTPATIENT
Start: 2021-06-22 | End: 2021-06-22

## 2021-06-22 NOTE — TELEPHONE ENCOUNTER
Fax from caleb gregorio new PA for patients Humira. PA completed with covermymeds. PA approved. 03/23/2021-06/22/2022        New script pended for provider.      LOV: 12/30/2020  Future Appointments   Date Time Provider Willie Brandon   7/1/2021 12:4

## 2021-06-26 NOTE — PROGRESS NOTES
Areli Garcia is a 67-year-old woman with CCP and RF positive rheumatoid arthritis, who I last saw in the office December 30th of 2020. July of 2020 she was doing poorly on Enbrel Sureclick weekly.   Many of her injections wouldn't go well, and she did not Abdominal: Soft. Bowel sounds are normal. She exhibits no mass. There is no tenderness. There is no rebound and no guarding. Musculoskeletal: She exhibits no edema. There is synovitis across her wrists or hand MCP and PIP joints.   She cannot make t

## 2021-07-01 ENCOUNTER — HOSPITAL ENCOUNTER (OUTPATIENT)
Dept: BONE DENSITY | Facility: HOSPITAL | Age: 76
Discharge: HOME | End: 2021-07-01
Attending: INTERNAL MEDICINE
Payer: MEDICARE

## 2021-07-01 DIAGNOSIS — Z78.0 POSTMENOPAUSAL: ICD-10-CM

## 2021-07-01 PROCEDURE — 77080 DXA BONE DENSITY AXIAL: CPT | Performed by: INTERNAL MEDICINE

## 2021-07-02 ENCOUNTER — OFFICE VISIT (OUTPATIENT)
Dept: RHEUMATOLOGY | Facility: CLINIC | Age: 76
End: 2021-07-02
Payer: MEDICARE

## 2021-07-02 ENCOUNTER — LAB ENCOUNTER (OUTPATIENT)
Dept: LAB | Age: 76
End: 2021-07-02
Attending: INTERNAL MEDICINE
Payer: MEDICARE

## 2021-07-02 VITALS
BODY MASS INDEX: 30.82 KG/M2 | SYSTOLIC BLOOD PRESSURE: 137 MMHG | HEART RATE: 77 BPM | DIASTOLIC BLOOD PRESSURE: 82 MMHG | WEIGHT: 185 LBS | HEIGHT: 65 IN

## 2021-07-02 DIAGNOSIS — Z51.81 THERAPEUTIC DRUG MONITORING: ICD-10-CM

## 2021-07-02 DIAGNOSIS — M05.79 SEROPOSITIVE RHEUMATOID ARTHRITIS OF MULTIPLE SITES (HCC): Primary | ICD-10-CM

## 2021-07-02 DIAGNOSIS — M05.79 SEROPOSITIVE RHEUMATOID ARTHRITIS OF MULTIPLE SITES (HCC): ICD-10-CM

## 2021-07-02 LAB
ALBUMIN SERPL-MCNC: 3.5 G/DL (ref 3.4–5)
AST SERPL-CCNC: 24 U/L (ref 15–37)
BASOPHILS # BLD AUTO: 0.04 X10(3) UL (ref 0–0.2)
BASOPHILS NFR BLD AUTO: 0.7 %
CREAT BLD-MCNC: 0.99 MG/DL
CRP SERPL-MCNC: 2.19 MG/DL (ref ?–0.3)
DEPRECATED RDW RBC AUTO: 41.9 FL (ref 35.1–46.3)
EOSINOPHIL # BLD AUTO: 0.2 X10(3) UL (ref 0–0.7)
EOSINOPHIL NFR BLD AUTO: 3.7 %
ERYTHROCYTE [DISTWIDTH] IN BLOOD BY AUTOMATED COUNT: 15.3 % (ref 11–15)
ERYTHROCYTE [SEDIMENTATION RATE] IN BLOOD: 38 MM/HR
HCT VFR BLD AUTO: 36.8 %
HGB BLD-MCNC: 11.5 G/DL
IMM GRANULOCYTES # BLD AUTO: 0.01 X10(3) UL (ref 0–1)
IMM GRANULOCYTES NFR BLD: 0.2 %
LYMPHOCYTES # BLD AUTO: 1.42 X10(3) UL (ref 1–4)
LYMPHOCYTES NFR BLD AUTO: 26.5 %
MCH RBC QN AUTO: 23.7 PG (ref 26–34)
MCHC RBC AUTO-ENTMCNC: 31.3 G/DL (ref 31–37)
MCV RBC AUTO: 75.9 FL
MONOCYTES # BLD AUTO: 0.53 X10(3) UL (ref 0.1–1)
MONOCYTES NFR BLD AUTO: 9.9 %
NEUTROPHILS # BLD AUTO: 3.16 X10 (3) UL (ref 1.5–7.7)
NEUTROPHILS # BLD AUTO: 3.16 X10(3) UL (ref 1.5–7.7)
NEUTROPHILS NFR BLD AUTO: 59 %
PLATELET # BLD AUTO: 299 10(3)UL (ref 150–450)
RBC # BLD AUTO: 4.85 X10(6)UL
WBC # BLD AUTO: 5.4 X10(3) UL (ref 4–11)

## 2021-07-02 PROCEDURE — 85025 COMPLETE CBC W/AUTO DIFF WBC: CPT

## 2021-07-02 PROCEDURE — 82040 ASSAY OF SERUM ALBUMIN: CPT

## 2021-07-02 PROCEDURE — 86480 TB TEST CELL IMMUN MEASURE: CPT

## 2021-07-02 PROCEDURE — 86140 C-REACTIVE PROTEIN: CPT

## 2021-07-02 PROCEDURE — 99214 OFFICE O/P EST MOD 30 MIN: CPT | Performed by: INTERNAL MEDICINE

## 2021-07-02 PROCEDURE — 84450 TRANSFERASE (AST) (SGOT): CPT

## 2021-07-02 PROCEDURE — 36415 COLL VENOUS BLD VENIPUNCTURE: CPT

## 2021-07-02 PROCEDURE — 85652 RBC SED RATE AUTOMATED: CPT

## 2021-07-02 PROCEDURE — 82565 ASSAY OF CREATININE: CPT

## 2021-07-02 RX ORDER — PREDNISONE 1 MG/1
TABLET ORAL
Qty: 100 TABLET | Refills: 1 | Status: ON HOLD | OUTPATIENT
Start: 2021-07-02 | End: 2021-08-26

## 2021-07-05 LAB
M TB IFN-G CD4+ T-CELLS BLD-ACNC: 0 IU/ML
M TB TUBERC IFN-G BLD QL: NEGATIVE
M TB TUBERC IGNF/MITOGEN IGNF CONTROL: 6.05 IU/ML
QFT TB1 AG MINUS NIL: 0.02 IU/ML
QFT TB2 AG MINUS NIL: 0.03 IU/ML

## 2021-07-06 ENCOUNTER — TELEPHONE (OUTPATIENT)
Dept: RHEUMATOLOGY | Facility: CLINIC | Age: 76
End: 2021-07-06

## 2021-07-12 ENCOUNTER — LAB ENCOUNTER (OUTPATIENT)
Dept: LAB | Facility: HOSPITAL | Age: 76
End: 2021-07-12
Attending: INTERNAL MEDICINE
Payer: MEDICARE

## 2021-07-12 DIAGNOSIS — R13.12 OROPHARYNGEAL DYSPHAGIA: ICD-10-CM

## 2021-07-12 LAB — SARS-COV-2 RNA RESP QL NAA+PROBE: NOT DETECTED

## 2021-07-13 RX ORDER — MEDROXYPROGESTERONE ACETATE 150 MG/ML
50 INJECTION, SUSPENSION INTRAMUSCULAR WEEKLY
Qty: 4 EACH | Refills: 5 | Status: SHIPPED | OUTPATIENT
Start: 2021-07-13 | End: 2021-08-12

## 2021-07-13 NOTE — TELEPHONE ENCOUNTER
Received a fax from [address] [place] [address] 50mg/ml Approved  4/12/2021 - 7/12/2022  Reference # [id number]    Questions  Contact: [phone]    \"Enbrel had worked very well 50 mg weekly, but she often did not get the proper dose, because the pens that she was using frequently malfunctioned. \"  No teaching needed, since the patient has been on enbrel.      Patient informed and states she did fill Enbrel through [employer]

## 2021-07-15 ENCOUNTER — HOSPITAL ENCOUNTER (OUTPATIENT)
Dept: GENERAL RADIOLOGY | Facility: HOSPITAL | Age: 76
Discharge: HOME | End: 2021-07-15
Attending: INTERNAL MEDICINE
Payer: MEDICARE

## 2021-07-15 DIAGNOSIS — R13.12 OROPHARYNGEAL DYSPHAGIA: ICD-10-CM

## 2021-07-15 PROCEDURE — 92611 MOTION FLUOROSCOPY/SWALLOW: CPT

## 2021-07-15 PROCEDURE — 74230 X-RAY XM SWLNG FUNCJ C+: CPT | Performed by: INTERNAL MEDICINE

## 2021-07-15 NOTE — PATIENT INSTRUCTIONS
Diet Recommendations:  Solids: Regular  Liquids: Thin    Recommended compensatory strategies:   Sit upright  Small sips    Medication Administration:  No restrictions    Further Follow-up:  No swallowing therapy recommended at this time.   Pt may benefit

## 2021-07-15 NOTE — PROGRESS NOTES
ADULT VIDEOFLUOROSCOPIC SWALLOWING STUDY       ADULT VIDEOFLUOROSCOPIC SWALLOWING STUDY:   Referring Physician: Co      Radiologist: Dr. Chance Cuello  Diagnosis: dysphagia    Date of Service: 7/15/2021     PATIENT SUMMARY   Chief Complaint:  For the past yea swallow. This resulted in shallow, transient laryngeal penetration intermittently with thin liquids by cup and straw. No material remained in the laryngeal vestibule. No aspiration was observed. No pharyngeal retention remained.        Esophageal phase: recommendations.     Thank you for your referral.  If you have any questions, please contact me at Dept: [address] [facility]  [address] [facility]  [phone]    Electronically signed by saray

## 2021-07-27 ENCOUNTER — HOSPITAL ENCOUNTER (OUTPATIENT)
Dept: GENERAL RADIOLOGY | Facility: HOSPITAL | Age: 76
Discharge: HOME | End: 2021-07-27
Attending: INTERNAL MEDICINE
Payer: MEDICARE

## 2021-07-27 ENCOUNTER — OFFICE VISIT (OUTPATIENT)
Dept: INTERNAL MEDICINE CLINIC | Facility: CLINIC | Age: 76
End: 2021-07-27
Payer: COMMERCIAL

## 2021-07-27 VITALS
HEIGHT: 65 IN | HEART RATE: 78 BPM | BODY MASS INDEX: 31.32 KG/M2 | WEIGHT: 188 LBS | DIASTOLIC BLOOD PRESSURE: 81 MMHG | SYSTOLIC BLOOD PRESSURE: 127 MMHG | OXYGEN SATURATION: 98 %

## 2021-07-27 DIAGNOSIS — K22.5 ZENKER DIVERTICULA: ICD-10-CM

## 2021-07-27 DIAGNOSIS — M54.41 CHRONIC MIDLINE LOW BACK PAIN WITH BILATERAL SCIATICA: ICD-10-CM

## 2021-07-27 DIAGNOSIS — M05.79 SEROPOSITIVE RHEUMATOID ARTHRITIS OF MULTIPLE SITES (HCC): ICD-10-CM

## 2021-07-27 DIAGNOSIS — J45.20 MILD INTERMITTENT REACTIVE AIRWAY DISEASE WITHOUT COMPLICATION: ICD-10-CM

## 2021-07-27 DIAGNOSIS — M54.50 CHRONIC MIDLINE LOW BACK PAIN WITHOUT SCIATICA: Primary | ICD-10-CM

## 2021-07-27 DIAGNOSIS — M54.42 CHRONIC MIDLINE LOW BACK PAIN WITH BILATERAL SCIATICA: ICD-10-CM

## 2021-07-27 DIAGNOSIS — G89.29 CHRONIC MIDLINE LOW BACK PAIN WITH BILATERAL SCIATICA: ICD-10-CM

## 2021-07-27 DIAGNOSIS — R20.2 PARESTHESIA OF BOTH LOWER EXTREMITIES: ICD-10-CM

## 2021-07-27 DIAGNOSIS — G89.29 CHRONIC MIDLINE LOW BACK PAIN WITHOUT SCIATICA: Primary | ICD-10-CM

## 2021-07-27 PROCEDURE — 99215 OFFICE O/P EST HI 40 MIN: CPT | Performed by: INTERNAL MEDICINE

## 2021-07-27 PROCEDURE — 3008F BODY MASS INDEX DOCD: CPT | Performed by: INTERNAL MEDICINE

## 2021-07-27 PROCEDURE — 3074F SYST BP LT 130 MM HG: CPT | Performed by: INTERNAL MEDICINE

## 2021-07-27 PROCEDURE — 72110 X-RAY EXAM L-2 SPINE 4/>VWS: CPT | Performed by: INTERNAL MEDICINE

## 2021-07-27 PROCEDURE — 3079F DIAST BP 80-89 MM HG: CPT | Performed by: INTERNAL MEDICINE

## 2021-07-27 NOTE — PROGRESS NOTES
HPI:    Patient ID: Ivet Jennings is a 77year old female. HPI    Patient is has rheumatoid arthritis, anxiety disorder, obesity . She has low back pain with tingling sensation of both lower legs.  Glenys Brashercal on her buttock at [place] 3 days ago due total) by mouth nightly. 30 tablet 1   • Meloxicam 7.5 MG Oral Tab Take 1 tablet (7.5 mg total) by mouth daily. 30 tablet 2   • Fluticasone Propionate 50 MCG/ACT Nasal Suspension 2 sprays by Each Nare route daily.  3 Bottle 3   • Tolterodine Tartrate ER 4 M Release      • Omeprazole 40 MG Oral Capsule Delayed Release Take 1 capsule (40 mg total) by mouth daily. Before a meal 30 capsule 11   • LORazepam (ATIVAN) 1 MG Oral Tab Take 1 tablet (1 mg total) by mouth nightly.  30 tablet 1   • Meloxicam 7.5 MG Oral Ta motion. Cervical back: Normal range of motion and neck supple. Comments: No joint deformities or tenderness. Tender paraspinal lumbar and gluteal area.   Straight leg negative bilateral there is no motor and sensory deficit of both lower extremit femoral neck or spine) and a 10-year probability of a hip fracture > 3% or a 10-year probability of a major osteoporosis-related fracture > 20% based on the [place]-adapted WHO algorithm                   Impression   CONCLUSION:   1.  Findings in the left femor             Dictated by (CST): Jt Ramirez MD on 4/16/2021 at             ASSESSMENT/PLAN:   1. Chronic midline low back pain without sciatica  We reviewed and discuss report of her normal bone DEXA and chest x-ray which showed kyphoscoliosis.   Chr

## 2021-07-28 PROBLEM — K22.5 ZENKER DIVERTICULA: Status: ACTIVE | Noted: 2021-07-28

## 2021-07-29 ENCOUNTER — TELEPHONE (OUTPATIENT)
Dept: INTERNAL MEDICINE CLINIC | Facility: CLINIC | Age: 76
End: 2021-07-29

## 2021-07-29 NOTE — PATIENT INSTRUCTIONS
Understanding Lumbar Radiculopathy    Lumbar radiculopathy is irritation or inflammation of a nerve root in the low back. It causes symptoms that spread out from the back down one or both legs.  To understand this condition, it helps to understand the par radiculopathy  These include:  · Pain in the low back  · Pain, numbness, tingling, or weakness that travels into the buttocks, hip, groin, or leg  · Muscle spasms in the low back, or leg  Treatment for lumbar radiculopathy  In most cases, your healthcare p

## 2021-07-29 NOTE — TELEPHONE ENCOUNTER
Patient would like to know if the golf ball feeling under her feet that she was seen for on Tuesday has anything to do with her condition and is it why she cant keep her balance.   Patient states she is unable to walk now and would like to schedule a virtua

## 2021-08-04 ENCOUNTER — OFFICE VISIT (OUTPATIENT)
Dept: NEUROLOGY | Facility: CLINIC | Age: 76
End: 2021-08-04
Payer: MEDICARE

## 2021-08-04 ENCOUNTER — TELEPHONE (OUTPATIENT)
Dept: NEUROLOGY | Facility: CLINIC | Age: 76
End: 2021-08-04

## 2021-08-04 VITALS
HEIGHT: 65 IN | HEART RATE: 60 BPM | DIASTOLIC BLOOD PRESSURE: 82 MMHG | WEIGHT: 182 LBS | BODY MASS INDEX: 30.32 KG/M2 | RESPIRATION RATE: 20 BRPM | SYSTOLIC BLOOD PRESSURE: 142 MMHG

## 2021-08-04 DIAGNOSIS — M54.16 LUMBAR RADICULITIS: Primary | ICD-10-CM

## 2021-08-04 PROCEDURE — 99204 OFFICE O/P NEW MOD 45 MIN: CPT | Performed by: PHYSICAL MEDICINE & REHABILITATION

## 2021-08-04 RX ORDER — GABAPENTIN 300 MG/1
CAPSULE ORAL
Qty: 60 CAPSULE | Refills: 0 | Status: ON HOLD | OUTPATIENT
Start: 2021-08-04 | End: 2021-08-26

## 2021-08-04 RX ORDER — OXYCODONE AND ACETAMINOPHEN 7.5; 325 MG/1; MG/1
1 TABLET ORAL EVERY 8 HOURS PRN
Qty: 30 TABLET | Refills: 0 | Status: ON HOLD | OUTPATIENT
Start: 2021-08-04 | End: 2021-08-08

## 2021-08-04 NOTE — TELEPHONE ENCOUNTER
Per Medicare guidelines authorization is not required for MRI L-spine wo cpt code 37074. Pt. informed. Transferred call to scheduling for appt.

## 2021-08-04 NOTE — PROGRESS NOTES
Progress note    C/C: low back and leg pain    HPI: This is a 71-year-old female with past medical history of rheumatoid arthritis who presents with lower back pain at and below the waistline that radiates down both legs, right worse than left, to the plan denies   Peripheral Vascular  Swelling of Legs/Feet: denies  Cold Extremities: admits   Skin  Open Sores: denies  Nodules or Lumps: denies  Rash: denies   Neurological  Loss of Strength Since last Visit: admits  Tingling/Numbness: admits  Balance: admits [place]

## 2021-08-06 ENCOUNTER — HOSPITAL ENCOUNTER (OUTPATIENT)
Facility: HOSPITAL | Age: 76
Setting detail: OBSERVATION
Discharge: HOME | End: 2021-08-08
Attending: EMERGENCY MEDICINE | Admitting: HOSPITALIST
Payer: MEDICARE

## 2021-08-06 ENCOUNTER — HOSPITAL ENCOUNTER (OUTPATIENT)
Dept: MRI IMAGING | Age: 76
Discharge: HOME | End: 2021-08-06
Attending: PHYSICAL MEDICINE & REHABILITATION
Payer: MEDICARE

## 2021-08-06 ENCOUNTER — TELEPHONE (OUTPATIENT)
Dept: NEUROLOGY | Facility: CLINIC | Age: 76
End: 2021-08-06

## 2021-08-06 DIAGNOSIS — M54.59 INTRACTABLE LOW BACK PAIN: Primary | ICD-10-CM

## 2021-08-06 DIAGNOSIS — M54.16 LUMBAR RADICULOPATHY: ICD-10-CM

## 2021-08-06 DIAGNOSIS — M54.16 LUMBAR RADICULITIS: ICD-10-CM

## 2021-08-06 PROBLEM — M48.061 LUMBAR FORAMINAL STENOSIS: Status: ACTIVE | Noted: 2021-08-06

## 2021-08-06 PROBLEM — M43.16 SPONDYLOLISTHESIS OF LUMBAR REGION: Status: ACTIVE | Noted: 2021-08-06

## 2021-08-06 PROBLEM — M51.9 LUMBAR DISC DISEASE: Status: ACTIVE | Noted: 2021-08-06

## 2021-08-06 PROBLEM — M51.26 LUMBAR HERNIATED DISC: Status: ACTIVE | Noted: 2021-08-06

## 2021-08-06 PROBLEM — M48.062 SPINAL STENOSIS OF LUMBAR REGION WITH NEUROGENIC CLAUDICATION: Status: ACTIVE | Noted: 2021-08-06

## 2021-08-06 LAB
ANION GAP SERPL CALC-SCNC: 6 MMOL/L (ref 0–18)
BASOPHILS # BLD AUTO: 0.04 X10(3) UL (ref 0–0.2)
BASOPHILS NFR BLD AUTO: 0.5 %
BUN BLD-MCNC: 19 MG/DL (ref 7–18)
BUN/CREAT SERPL: 21.3 (ref 10–20)
CALCIUM BLD-MCNC: 9.4 MG/DL (ref 8.5–10.1)
CHLORIDE SERPL-SCNC: 104 MMOL/L (ref 98–112)
CO2 SERPL-SCNC: 26 MMOL/L (ref 21–32)
CREAT BLD-MCNC: 0.89 MG/DL
DEPRECATED RDW RBC AUTO: 40.1 FL (ref 35.1–46.3)
EOSINOPHIL # BLD AUTO: 0.11 X10(3) UL (ref 0–0.7)
EOSINOPHIL NFR BLD AUTO: 1.5 %
ERYTHROCYTE [DISTWIDTH] IN BLOOD BY AUTOMATED COUNT: 15.4 % (ref 11–15)
GLUCOSE BLD-MCNC: 139 MG/DL (ref 70–99)
HCT VFR BLD AUTO: 37.1 %
HGB BLD-MCNC: 11.8 G/DL
IMM GRANULOCYTES # BLD AUTO: 0.02 X10(3) UL (ref 0–1)
IMM GRANULOCYTES NFR BLD: 0.3 %
LYMPHOCYTES # BLD AUTO: 2.44 X10(3) UL (ref 1–4)
LYMPHOCYTES NFR BLD AUTO: 32.5 %
MCH RBC QN AUTO: 23.3 PG (ref 26–34)
MCHC RBC AUTO-ENTMCNC: 31.8 G/DL (ref 31–37)
MCV RBC AUTO: 73.3 FL
MONOCYTES # BLD AUTO: 0.98 X10(3) UL (ref 0.1–1)
MONOCYTES NFR BLD AUTO: 13 %
NEUTROPHILS # BLD AUTO: 3.92 X10 (3) UL (ref 1.5–7.7)
NEUTROPHILS # BLD AUTO: 3.92 X10(3) UL (ref 1.5–7.7)
NEUTROPHILS NFR BLD AUTO: 52.2 %
OSMOLALITY SERPL CALC.SUM OF ELEC: 287 MOSM/KG (ref 275–295)
PLATELET # BLD AUTO: 263 10(3)UL (ref 150–450)
POTASSIUM SERPL-SCNC: 3.5 MMOL/L (ref 3.5–5.1)
RBC # BLD AUTO: 5.06 X10(6)UL
SODIUM SERPL-SCNC: 136 MMOL/L (ref 136–145)
WBC # BLD AUTO: 7.5 X10(3) UL (ref 4–11)

## 2021-08-06 PROCEDURE — 99214 OFFICE O/P EST MOD 30 MIN: CPT | Performed by: PHYSICAL MEDICINE & REHABILITATION

## 2021-08-06 PROCEDURE — 72148 MRI LUMBAR SPINE W/O DYE: CPT | Performed by: PHYSICAL MEDICINE & REHABILITATION

## 2021-08-06 PROCEDURE — 99220 INITIAL OBSERVATION CARE,LEVL III: CPT | Performed by: HOSPITALIST

## 2021-08-06 RX ORDER — MORPHINE SULFATE 4 MG/ML
4 INJECTION, SOLUTION INTRAMUSCULAR; INTRAVENOUS ONCE
Status: COMPLETED | OUTPATIENT
Start: 2021-08-06 | End: 2021-08-06

## 2021-08-06 RX ORDER — ACETAMINOPHEN 325 MG/1
650 TABLET ORAL EVERY 6 HOURS PRN
Status: DISCONTINUED | OUTPATIENT
Start: 2021-08-06 | End: 2021-08-08

## 2021-08-06 RX ORDER — MORPHINE SULFATE 4 MG/ML
4 INJECTION, SOLUTION INTRAMUSCULAR; INTRAVENOUS EVERY 2 HOUR PRN
Status: DISCONTINUED | OUTPATIENT
Start: 2021-08-06 | End: 2021-08-08

## 2021-08-06 RX ORDER — METOCLOPRAMIDE HYDROCHLORIDE 5 MG/ML
10 INJECTION INTRAMUSCULAR; INTRAVENOUS EVERY 8 HOURS PRN
Status: DISCONTINUED | OUTPATIENT
Start: 2021-08-06 | End: 2021-08-08

## 2021-08-06 RX ORDER — OXYBUTYNIN CHLORIDE 10 MG/1
10 TABLET, EXTENDED RELEASE ORAL DAILY
Refills: 1 | Status: DISCONTINUED | OUTPATIENT
Start: 2021-08-07 | End: 2021-08-08

## 2021-08-06 RX ORDER — CYCLOBENZAPRINE HCL 10 MG
10 TABLET ORAL 3 TIMES DAILY PRN
Status: DISCONTINUED | OUTPATIENT
Start: 2021-08-06 | End: 2021-08-08

## 2021-08-06 RX ORDER — PREDNISONE 1 MG/1
5 TABLET ORAL DAILY
Status: DISCONTINUED | OUTPATIENT
Start: 2021-08-07 | End: 2021-08-07

## 2021-08-06 RX ORDER — GABAPENTIN 100 MG/1
100 CAPSULE ORAL 3 TIMES DAILY
Status: DISCONTINUED | OUTPATIENT
Start: 2021-08-06 | End: 2021-08-06

## 2021-08-06 RX ORDER — LORAZEPAM 1 MG/1
1 TABLET ORAL NIGHTLY
Status: DISCONTINUED | OUTPATIENT
Start: 2021-08-06 | End: 2021-08-08

## 2021-08-06 RX ORDER — MORPHINE SULFATE 2 MG/ML
2 INJECTION, SOLUTION INTRAMUSCULAR; INTRAVENOUS EVERY 2 HOUR PRN
Status: DISCONTINUED | OUTPATIENT
Start: 2021-08-06 | End: 2021-08-08

## 2021-08-06 RX ORDER — ONDANSETRON 2 MG/ML
4 INJECTION INTRAMUSCULAR; INTRAVENOUS EVERY 6 HOURS PRN
Status: DISCONTINUED | OUTPATIENT
Start: 2021-08-06 | End: 2021-08-08

## 2021-08-06 RX ORDER — MORPHINE SULFATE 2 MG/ML
1 INJECTION, SOLUTION INTRAMUSCULAR; INTRAVENOUS EVERY 2 HOUR PRN
Status: DISCONTINUED | OUTPATIENT
Start: 2021-08-06 | End: 2021-08-08

## 2021-08-06 RX ORDER — HYDROCODONE BITARTRATE AND ACETAMINOPHEN 5; 325 MG/1; MG/1
2 TABLET ORAL EVERY 4 HOURS PRN
Status: DISCONTINUED | OUTPATIENT
Start: 2021-08-06 | End: 2021-08-08

## 2021-08-06 RX ORDER — GABAPENTIN 300 MG/1
300 CAPSULE ORAL 3 TIMES DAILY
Status: DISCONTINUED | OUTPATIENT
Start: 2021-08-06 | End: 2021-08-08

## 2021-08-06 RX ORDER — PANTOPRAZOLE SODIUM 40 MG/1
40 TABLET, DELAYED RELEASE ORAL
Refills: 11 | Status: DISCONTINUED | OUTPATIENT
Start: 2021-08-07 | End: 2021-08-08

## 2021-08-06 RX ORDER — HYDROCODONE BITARTRATE AND ACETAMINOPHEN 5; 325 MG/1; MG/1
1 TABLET ORAL EVERY 4 HOURS PRN
Status: DISCONTINUED | OUTPATIENT
Start: 2021-08-06 | End: 2021-08-08

## 2021-08-06 RX ORDER — HYDROCODONE BITARTRATE AND ACETAMINOPHEN 5; 325 MG/1; MG/1
1 TABLET ORAL ONCE
Status: COMPLETED | OUTPATIENT
Start: 2021-08-06 | End: 2021-08-06

## 2021-08-06 RX ORDER — FLUTICASONE PROPIONATE 50 MCG
2 SPRAY, SUSPENSION (ML) NASAL DAILY
Status: DISCONTINUED | OUTPATIENT
Start: 2021-08-06 | End: 2021-08-08

## 2021-08-06 RX ORDER — ACETAMINOPHEN 325 MG/1
650 TABLET ORAL EVERY 4 HOURS PRN
Status: DISCONTINUED | OUTPATIENT
Start: 2021-08-06 | End: 2021-08-08

## 2021-08-06 RX ORDER — ALBUTEROL SULFATE 90 UG/1
2 AEROSOL, METERED RESPIRATORY (INHALATION) EVERY 6 HOURS PRN
Status: DISCONTINUED | OUTPATIENT
Start: 2021-08-06 | End: 2021-08-08

## 2021-08-06 NOTE — PLAN OF CARE
Problem: Patient Centered Care  Goal: Patient preferences are identified and integrated in the patient's plan of care  Description: Interventions:  - What would you like us to know as we care for you?   - Provide timely, complete, and accurate informatio INTERVENTIONS  - Monitor WBC  - Administer growth factors as ordered  - Implement neutropenic guidelines  Outcome: Progressing     Problem: SAFETY ADULT - FALL  Goal: Free from fall injury  Description: INTERVENTIONS:  - Assess pt frequently for physical n

## 2021-08-06 NOTE — CONSULTS
[address] [facility] Patient Status:  Observation    1945 MRN [id number]   Location [facility] 5SW/SE Attending Kellen Levin MD   Hosp Day # 0 PCP Beckie Moore MD     Patient Identification  Kerline Dean Family History   Problem Relation Age of Onset   • Arthritis Father      Social History    Tobacco Use      Smoking status: Never Smoker      Smokeless tobacco: Never Used    Vaping Use      Vaping Use: Never used    Alcohol use:  Yes      Alcohol/week: RIGHT plantar reflexes: downward response   LEFT plantar reflexes: downward response   Reflexes: absent in bilateral lower extremities          Additional comments:I personally viewed and interpreted the patient's MRi of the lumbar spine and showed the f

## 2021-08-06 NOTE — ED QUICK NOTES
Orders for admission, patient is aware of plan and ready to go upstairs. Any questions, please call ED RN KAMI  at [address].    Type of COVID test sent: VACCINATED  COVID Suspicion level: Low  Titratable drug(s) infusing:N/A  Rate:N/A    LOC at time

## 2021-08-06 NOTE — TELEPHONE ENCOUNTER
S/w patient who states she feels she is doing better than at the appointment but the weakness/rubbery feeling in her legs is worse. C/o pain in buttocks 6-7/10 and knee to feet 10/10 w/ numbness in feet. Denies b/b incontinence. Denies saddle anesthesia.  P

## 2021-08-06 NOTE — TELEPHONE ENCOUNTER
Informed patient of Dr. Meraz Sis recommendation to be evaluated at ED. Patient's daughter will take her to ED. Informed ED triage RN patient is headed their way.

## 2021-08-06 NOTE — ED INITIAL ASSESSMENT (HPI)
Patient here with lower back pain that radiates down both legs. Patient spoke with PMD who instructed to come to the ED. Patient having difficulty ambulating. Denies loss of bowel or bladder control.

## 2021-08-06 NOTE — TELEPHONE ENCOUNTER
MRI of the lumbar spine completed, was contacted by radiologist Dr. Smith Martínez.   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    Staff, please contact patient and let her know she has

## 2021-08-06 NOTE — H&P
[facility]    PATIENT'S NAME: Tawanna Live   ATTENDING PHYSICIAN: Joana Warner MD   PATIENT ACCOUNT#:   [de-identified]    LOCATION:  [place] [address]  MEDICAL RECORD #:   [id number]       YOB: 1945  ADMISSION DATE:       08/ pain got better with pain medications the day before yesterday but got worse today to the point that she cannot find a comfortable position. Other 12-point review of systems is negative.        PHYSICAL EXAMINATION:    GENERAL:  Alert and oriented to time,

## 2021-08-06 NOTE — ED PROVIDER NOTES
Patient Seen in: [facility] AND [facility] Emergency Department      History   Patient presents with:  Back Pain    Stated Complaint: Lower Back Pain    HPI/Subjective:   HPI   77 yoF presenting for evaluation of severe low back pain.   For about 1 month she has reviewed. Constitutional:       Appearance: She is well-developed. HENT:      Head: Normocephalic and atraumatic. Nose: Nose normal.      Mouth/Throat:      Mouth: Mucous membranes are moist.      Pharynx: Oropharynx is clear.    Eyes:      Extraoc individual orders.           MRI SPINE LUMBAR (CPT=72148)    Result Date: 8/6/2021  CONCLUSION:   Degenerative disc and facet disease throughout the lumbar spine, most prominent at L4-L5, where there is critical stenosis of the canal with no normal intrathe pain M54.5 8/6/2021 Unknown

## 2021-08-07 ENCOUNTER — APPOINTMENT (OUTPATIENT)
Dept: GENERAL RADIOLOGY | Facility: HOSPITAL | Age: 76
End: 2021-08-07
Attending: PHYSICAL MEDICINE & REHABILITATION
Payer: MEDICARE

## 2021-08-07 LAB
ANION GAP SERPL CALC-SCNC: 7 MMOL/L (ref 0–18)
BASOPHILS # BLD AUTO: 0.04 X10(3) UL (ref 0–0.2)
BASOPHILS NFR BLD AUTO: 0.5 %
BUN BLD-MCNC: 17 MG/DL (ref 7–18)
BUN/CREAT SERPL: 19.8 (ref 10–20)
CALCIUM BLD-MCNC: 9.2 MG/DL (ref 8.5–10.1)
CHLORIDE SERPL-SCNC: 104 MMOL/L (ref 98–112)
CO2 SERPL-SCNC: 25 MMOL/L (ref 21–32)
CREAT BLD-MCNC: 0.86 MG/DL
DEPRECATED RDW RBC AUTO: 41.8 FL (ref 35.1–46.3)
EOSINOPHIL # BLD AUTO: 0.2 X10(3) UL (ref 0–0.7)
EOSINOPHIL NFR BLD AUTO: 2.7 %
ERYTHROCYTE [DISTWIDTH] IN BLOOD BY AUTOMATED COUNT: 15.6 % (ref 11–15)
GLUCOSE BLD-MCNC: 103 MG/DL (ref 70–99)
HCT VFR BLD AUTO: 35 %
HGB BLD-MCNC: 11 G/DL
IMM GRANULOCYTES # BLD AUTO: 0.02 X10(3) UL (ref 0–1)
IMM GRANULOCYTES NFR BLD: 0.3 %
LYMPHOCYTES # BLD AUTO: 2.71 X10(3) UL (ref 1–4)
LYMPHOCYTES NFR BLD AUTO: 36.7 %
MCH RBC QN AUTO: 23.5 PG (ref 26–34)
MCHC RBC AUTO-ENTMCNC: 31.4 G/DL (ref 31–37)
MCV RBC AUTO: 74.8 FL
MONOCYTES # BLD AUTO: 0.89 X10(3) UL (ref 0.1–1)
MONOCYTES NFR BLD AUTO: 12.1 %
NEUTROPHILS # BLD AUTO: 3.52 X10 (3) UL (ref 1.5–7.7)
NEUTROPHILS # BLD AUTO: 3.52 X10(3) UL (ref 1.5–7.7)
NEUTROPHILS NFR BLD AUTO: 47.7 %
OSMOLALITY SERPL CALC.SUM OF ELEC: 284 MOSM/KG (ref 275–295)
PLATELET # BLD AUTO: 247 10(3)UL (ref 150–450)
POTASSIUM SERPL-SCNC: 4.3 MMOL/L (ref 3.5–5.1)
RBC # BLD AUTO: 4.68 X10(6)UL
SODIUM SERPL-SCNC: 136 MMOL/L (ref 136–145)
WBC # BLD AUTO: 7.4 X10(3) UL (ref 4–11)

## 2021-08-07 PROCEDURE — 64483 NJX AA&/STRD TFRM EPI L/S 1: CPT | Performed by: PHYSICAL MEDICINE & REHABILITATION

## 2021-08-07 PROCEDURE — 3E0R3BZ INTRODUCTION OF ANESTHETIC AGENT INTO SPINAL CANAL, PERCUTANEOUS APPROACH: ICD-10-PCS | Performed by: PHYSICAL MEDICINE & REHABILITATION

## 2021-08-07 PROCEDURE — 3E0R33Z INTRODUCTION OF ANTI-INFLAMMATORY INTO SPINAL CANAL, PERCUTANEOUS APPROACH: ICD-10-PCS | Performed by: PHYSICAL MEDICINE & REHABILITATION

## 2021-08-07 PROCEDURE — 99226 SUBSEQUENT OBSERVATION CARE: CPT | Performed by: HOSPITALIST

## 2021-08-07 RX ORDER — DEXAMETHASONE SODIUM PHOSPHATE 10 MG/ML
INJECTION, SOLUTION INTRAMUSCULAR; INTRAVENOUS AS NEEDED
Status: DISCONTINUED | OUTPATIENT
Start: 2021-08-07 | End: 2021-08-07 | Stop reason: HOSPADM

## 2021-08-07 RX ORDER — LIDOCAINE HYDROCHLORIDE 10 MG/ML
INJECTION, SOLUTION EPIDURAL; INFILTRATION; INTRACAUDAL; PERINEURAL AS NEEDED
Status: DISCONTINUED | OUTPATIENT
Start: 2021-08-07 | End: 2021-08-07 | Stop reason: HOSPADM

## 2021-08-07 NOTE — PHYSICAL THERAPY NOTE
PHYSICAL THERAPY EVALUATION - INPATIENT     Room Number: 288/449-E  Evaluation Date: 8/7/2021  Type of Evaluation: Initial   Physician Order: PT Eval and Treat    Presenting Problem: lumbar radiculopathy  Reason for Therapy: Mobility Dysfunction and Disch Good  Frequency (Obs): 5x/week       PHYSICAL THERAPY MEDICAL/SOCIAL HISTORY     History related to current admission:     Problem List  Principal Problem:    right > left S1 radiculopathy  Active Problems:    Intractable low back pain    L4-5 grade 2 spon -    NEUROLOGICAL FINDINGS    LE coordination WFL  LE sensation intact          AM-PAC '6-Clicks' INPATIENT SHORT FORM - BASIC MOBILITY  How much difficulty does the patient currently have. ..  -   Turning over in bed (including adjusting bedclothes, sheets assist device: walker - rolling at assistance level: modified independent   Goal #3   Current Status    Goal #4 Patient will negotiate 1 stairs/one curb w/ assistive device and supervision   Goal #4   Current Status    Goal #5 Patient to demonstrate indepe

## 2021-08-07 NOTE — PROGRESS NOTES
[facility]    Progress Note    Sharene Scriver Patient Status:  Observation    1945 MRN [id number]   Location [facility] 5SW/SE Attending Marc Robles MD   Hosp Day # 0 PCP Tin Bush MD       Subjective:   Silas L4-L5. Type 1 endplate degenerative changes at L4-L5. Multilevel degenerative spondylolisthesis as described. This report was communicated by telephone to Dr. Hu Bell on 8/6/2021 at 1110 hours.       Dictated by (CST): Xavier Mcelroy MD on 8/06/2021 at 10

## 2021-08-07 NOTE — PLAN OF CARE
Problem: Patient Centered Care  Goal: Patient preferences are identified and integrated in the patient's plan of care  Description: Interventions:  - What would you like us to know as we care for you?  I'm retired and live alone    - Provide timely, compl period  Description: INTERVENTIONS  - Monitor WBC  - Administer growth factors as ordered  - Implement neutropenic guidelines  Outcome: Progressing     Problem: SAFETY ADULT - FALL  Goal: Free from fall injury  Description: INTERVENTIONS:  - Assess pt freq

## 2021-08-07 NOTE — OPERATIVE REPORT
[facility] AND [facility] Surgery    BILATERAL LUMBAR TRANSFORAMINAL   NAME:  iJ Ji    MR #:    [id number] :  1945     PHYSICIAN:  Rusty Stubbs MD        Operative Report    DATE OF PROCEDURE: 2021   PREOPERATIVE DIAGNOSES: Problem   r point in time, the patient's skin was anesthetized with 2 to 3 cc of 1% PF lidocaine without epinephrine. Then, a 5 inch, 22-gauge spinal needle was inserted and directed towards the left L5-S1 intervertebral foramen.   When it felt to be in good position,

## 2021-08-07 NOTE — CONSULTS
[facility] HOSP - [facility]    Report of Consultation    Johnson Shahid Patient Status:  Observation    1945 MRN [id number]   Location [facility] 5SW/SE Attending Reina Goel MD   Hosp Day # 0 PCP Garfield Diaz MD     Date of Admiss Surgical History:   Procedure Laterality Date   • APPENDECTOMY  9/16/15        Family History  Family History   Problem Relation Age of Onset   • Arthritis Father        Social History  Social History    Tobacco Use      Smoking status: Never Smoker      S week.  Albuterol Sulfate  (90 Base) MCG/ACT Inhalation Aero Soln, Inhale 2 puffs into the lungs every 6 (six) hours as needed for Wheezing.   omeprazole 20 MG Oral Capsule Delayed Release,   Omeprazole 40 MG Oral Capsule Delayed Release, Take 1 capsu patellar reflex. Toes are down going bilaterally. There is no ankle clonus.     Results:     Laboratory Data:  Lab Results   Component Value Date    WBC 7.5 08/06/2021    HGB 11.8 (L) 08/06/2021    HCT 37.1 08/06/2021    .0 08/06/2021    CREATSERUM a herniated L4-5 disc, and lumbar stenosis seen on her MRI. We reviewed the findings of her MRI films, what these findings mean, and how they are related to her condition.   She has a lumbar epidural steroid injection scheduled for this morning and plans t

## 2021-08-08 VITALS
WEIGHT: 182 LBS | RESPIRATION RATE: 16 BRPM | BODY MASS INDEX: 30.32 KG/M2 | TEMPERATURE: 98 F | HEART RATE: 70 BPM | OXYGEN SATURATION: 100 % | DIASTOLIC BLOOD PRESSURE: 88 MMHG | SYSTOLIC BLOOD PRESSURE: 144 MMHG | HEIGHT: 65 IN

## 2021-08-08 LAB
ANION GAP SERPL CALC-SCNC: 6 MMOL/L (ref 0–18)
BUN BLD-MCNC: 23 MG/DL (ref 7–18)
BUN/CREAT SERPL: 24 (ref 10–20)
CALCIUM BLD-MCNC: 9.2 MG/DL (ref 8.5–10.1)
CHLORIDE SERPL-SCNC: 103 MMOL/L (ref 98–112)
CO2 SERPL-SCNC: 26 MMOL/L (ref 21–32)
CREAT BLD-MCNC: 0.96 MG/DL
DEPRECATED RDW RBC AUTO: 40.2 FL (ref 35.1–46.3)
ERYTHROCYTE [DISTWIDTH] IN BLOOD BY AUTOMATED COUNT: 15.2 % (ref 11–15)
GLUCOSE BLD-MCNC: 132 MG/DL (ref 70–99)
HCT VFR BLD AUTO: 36.9 %
HGB BLD-MCNC: 11.6 G/DL
MCH RBC QN AUTO: 23.3 PG (ref 26–34)
MCHC RBC AUTO-ENTMCNC: 31.4 G/DL (ref 31–37)
MCV RBC AUTO: 74.2 FL
OSMOLALITY SERPL CALC.SUM OF ELEC: 286 MOSM/KG (ref 275–295)
PLATELET # BLD AUTO: 299 10(3)UL (ref 150–450)
POTASSIUM SERPL-SCNC: 4.8 MMOL/L (ref 3.5–5.1)
RBC # BLD AUTO: 4.97 X10(6)UL
SODIUM SERPL-SCNC: 135 MMOL/L (ref 136–145)
WBC # BLD AUTO: 13.1 X10(3) UL (ref 4–11)

## 2021-08-08 PROCEDURE — 99217 OBSERVATION CARE DISCHARGE: CPT | Performed by: HOSPITALIST

## 2021-08-08 PROCEDURE — 99214 OFFICE O/P EST MOD 30 MIN: CPT | Performed by: PHYSICAL MEDICINE & REHABILITATION

## 2021-08-08 RX ORDER — CYCLOBENZAPRINE HCL 10 MG
10 TABLET ORAL 3 TIMES DAILY PRN
Qty: 90 TABLET | Refills: 0 | Status: SHIPPED | OUTPATIENT
Start: 2021-08-08 | End: 2021-08-23

## 2021-08-08 RX ORDER — HYDROCODONE BITARTRATE AND ACETAMINOPHEN 5; 325 MG/1; MG/1
1 TABLET ORAL EVERY 4 HOURS PRN
Qty: 42 TABLET | Refills: 0 | Status: ON HOLD | OUTPATIENT
Start: 2021-08-08 | End: 2021-08-26

## 2021-08-08 NOTE — PROGRESS NOTES
Ms. Darylene Ear is resting comfortably. She feels she is better after the epidural steroid injection, but did have an episode of severe left leg pain last night. This was controlled with oral medication.  She has no difficulty with bladder or bowel control and no

## 2021-08-08 NOTE — PROGRESS NOTES
[facility] Patient Status:  Observation    1945 MRN [id number]   Location [facility] 5SW/SE Attending Rosa Maria Mccray MD   Hosp Day # 0 PCP Sage Sawant MD     Subjective:   Ivet Jennings is a Sensation: Intact in bilateral LE except:  Decreased in the  arch of the RIGHT foot  first dorsal web space of the RIGHT foot   LE Muscle Strength:  All LE strength measurements 5/5 except:  Hamstring RIGHT:   3+/5  Hamstring LEFT:   4+/5   RIGHT plantar re

## 2021-08-08 NOTE — PLAN OF CARE
Problem: Patient Centered Care  Goal: Patient preferences are identified and integrated in the patient's plan of care  Description: Interventions:  - What would you like us to know as we care for you?  I'm retired and live alone    - Provide timely, compl period  Description: INTERVENTIONS  - Monitor WBC  - Administer growth factors as ordered  - Implement neutropenic guidelines  Outcome: Completed     Problem: SAFETY ADULT - FALL  Goal: Free from fall injury  Description: INTERVENTIONS:  - Assess pt freque

## 2021-08-08 NOTE — DISCHARGE SUMMARY
[facility]    Discharge Summary    Ronda Lind Patient Status:  Observation    1945 MRN [id number]   Location [facility] 5SW/SE Attending Davonte Trujillo MD   Hosp Day # 0 PCP Jared Matos MD     Date of Admission: 8 chemistry were unremarkable.       Discharge Physical Exam:   Physical Exam:    General: No acute distress. Respiratory: Clear to auscultation bilaterally. No wheezes. No rhonchi. Cardiovascular: S1, S2. Regular rate and rhythm.  No murmurs, rubs or gall meal    LORazepam (ATIVAN) 1 MG Oral Tab  Take 1 tablet (1 mg total) by mouth nightly. Multiple Vitamins-Calcium (ONE-A-DAY WOMENS FORMULA) Oral Tab  Take  by mouth.  One-A-Day Womens Formula 18 mg iron-400 mcg-500 mg Ca tablet    gabapentin 300 MG Oral August 12, 2021  Quantity: 4 each  Refills: 5     Fluticasone Propionate 50 MCG/ACT Susp  Commonly known as: FLONASE      2 sprays by Each Nare route daily.    Quantity: 3 Bottle  Refills: 3     gabapentin 300 MG Caps  Commonly known as: NEURONTIN      1 ca

## 2021-08-09 ENCOUNTER — TELEPHONE (OUTPATIENT)
Dept: NEUROLOGY | Facility: CLINIC | Age: 76
End: 2021-08-09

## 2021-08-09 NOTE — TELEPHONE ENCOUNTER
BILATERAL L5 TRANSFORAMINAL EPIDURAL STEROID INJECTIONS   Called Patient in regards to ED F/U and injection F/U. Patient  States that she is 75% better than she was.  She complains about weakness on bilateral legs and needs assistance getting up and walking

## 2021-08-12 ENCOUNTER — PATIENT MESSAGE (OUTPATIENT)
Dept: NEUROLOGY | Facility: CLINIC | Age: 76
End: 2021-08-12

## 2021-08-12 ENCOUNTER — TELEPHONE (OUTPATIENT)
Dept: NEUROLOGY | Facility: CLINIC | Age: 76
End: 2021-08-12

## 2021-08-12 DIAGNOSIS — M54.16 LUMBAR RADICULITIS: Primary | ICD-10-CM

## 2021-08-12 NOTE — TELEPHONE ENCOUNTER
Patient would like to hold off scheduling injection. She is scheduled for video visit to discuss options on 08/16/21. Ok per Dr. Shazia Kwong.

## 2021-08-12 NOTE — TELEPHONE ENCOUNTER
Bilateral L5 transforaminal epidural steroid injection under fluoroscopy   CPT CODE: 79798-51, 77003-APPROVED    Per Medicare Guidelines: Request is a covered benefit and pre-certification is not require.    All Medicare coverage is based on [facility]

## 2021-08-12 NOTE — TELEPHONE ENCOUNTER
Dr. Rubi Rivero did an injection for her on 8/7; the earliest I would recommend repeating epidural injection would be on 8/18. Will place order.

## 2021-08-12 NOTE — TELEPHONE ENCOUNTER
From: Miladis Donis  To: Rosana Roberts DO  Sent: 8/12/2021 1:03 PM CDT  Subject: Non-Urgent Medical Question    Good afternoon Dr. Tawanna Weathers. I am still in pain and I can't walk without a walker.  Although the pain is not as intense I am very uncomfor

## 2021-08-16 ENCOUNTER — TELEPHONE (OUTPATIENT)
Dept: NEUROLOGY | Facility: CLINIC | Age: 76
End: 2021-08-16

## 2021-08-16 ENCOUNTER — TELEMEDICINE (OUTPATIENT)
Dept: NEUROLOGY | Facility: CLINIC | Age: 76
End: 2021-08-16
Payer: MEDICARE

## 2021-08-16 ENCOUNTER — TELEPHONE (OUTPATIENT)
Dept: INTERNAL MEDICINE CLINIC | Facility: CLINIC | Age: 76
End: 2021-08-16

## 2021-08-16 DIAGNOSIS — M48.062 SPINAL STENOSIS OF LUMBAR REGION WITH NEUROGENIC CLAUDICATION: Primary | ICD-10-CM

## 2021-08-16 PROCEDURE — 99442 PHONE E/M BY PHYS 11-20 MIN: CPT | Performed by: PHYSICAL MEDICINE & REHABILITATION

## 2021-08-16 NOTE — PROGRESS NOTES
See telephone encounter for today's visit.     Carlitos Rosa DO  Physical Medicine and [address] [place] [address]

## 2021-08-16 NOTE — TELEPHONE ENCOUNTER
Patient would like to discus MRI result with Dr, Cherylene Boston and go over some options in regards to having a procedure. Please call and advise.

## 2021-08-16 NOTE — TELEPHONE ENCOUNTER
virtual/Telephone Check-In     Johnson Shahid verbally consents to a Virtual/Telephone Check-In service on 08/16/21.   Patient understands and accepts financial responsibility for any deductible, co-insurance and/or co-pays associated with this servic

## 2021-08-17 ENCOUNTER — TELEPHONE (OUTPATIENT)
Dept: NEUROLOGY | Facility: CLINIC | Age: 76
End: 2021-08-17

## 2021-08-17 DIAGNOSIS — M51.26 LUMBAR HERNIATED DISC: ICD-10-CM

## 2021-08-17 DIAGNOSIS — M48.062 SPINAL STENOSIS OF LUMBAR REGION WITH NEUROGENIC CLAUDICATION: Primary | ICD-10-CM

## 2021-08-18 NOTE — TELEPHONE ENCOUNTER
Daniel Espinosa [address] [place] [address] - Discussed with Dr Yoana Diaz. She should call again today at the office and speak with Shaun Gautam ( Dr Naomia Heimlich ) and ask for earlier f/u office appt.  Stalin Cronin is informing Shaun Gautam about this patient as well, right now so that she can

## 2021-08-20 ENCOUNTER — TELEPHONE (OUTPATIENT)
Dept: INTERNAL MEDICINE CLINIC | Facility: CLINIC | Age: 76
End: 2021-08-20

## 2021-08-20 DIAGNOSIS — Z01.811 PRE-OP CHEST EXAM: ICD-10-CM

## 2021-08-20 DIAGNOSIS — Z01.818 PREOP EXAMINATION: Primary | ICD-10-CM

## 2021-08-20 NOTE — TELEPHONE ENCOUNTER
Patient came in as a walk-in, brought orders from Dr. Dunn Postal. Stated they plan on doing surgery on her next week, 8/24 and needs clearance.   August Collazo calling Dr. Naomia Heimlich office to confirm surgery date in order to squeeze in patient in schedule for arlene

## 2021-08-21 ENCOUNTER — APPOINTMENT (OUTPATIENT)
Dept: GENERAL RADIOLOGY | Facility: HOSPITAL | Age: 76
End: 2021-08-21
Attending: EMERGENCY MEDICINE
Payer: MEDICARE

## 2021-08-21 ENCOUNTER — HOSPITAL ENCOUNTER (EMERGENCY)
Facility: HOSPITAL | Age: 76
Discharge: HOME | End: 2021-08-21
Attending: EMERGENCY MEDICINE
Payer: MEDICARE

## 2021-08-21 VITALS
TEMPERATURE: 97 F | DIASTOLIC BLOOD PRESSURE: 70 MMHG | RESPIRATION RATE: 19 BRPM | OXYGEN SATURATION: 100 % | WEIGHT: 180 LBS | BODY MASS INDEX: 30 KG/M2 | HEART RATE: 61 BPM | SYSTOLIC BLOOD PRESSURE: 164 MMHG

## 2021-08-21 DIAGNOSIS — M54.50 ACUTE EXACERBATION OF CHRONIC LOW BACK PAIN: Primary | ICD-10-CM

## 2021-08-21 DIAGNOSIS — G89.29 ACUTE EXACERBATION OF CHRONIC LOW BACK PAIN: Primary | ICD-10-CM

## 2021-08-21 LAB
ANION GAP SERPL CALC-SCNC: 7 MMOL/L (ref 0–18)
BASOPHILS # BLD AUTO: 0.06 X10(3) UL (ref 0–0.2)
BASOPHILS NFR BLD AUTO: 0.3 %
BILIRUB UR QL: NEGATIVE
BUN BLD-MCNC: 21 MG/DL (ref 7–18)
BUN/CREAT SERPL: 19.8 (ref 10–20)
CALCIUM BLD-MCNC: 9.4 MG/DL (ref 8.5–10.1)
CHLORIDE SERPL-SCNC: 103 MMOL/L (ref 98–112)
CO2 SERPL-SCNC: 27 MMOL/L (ref 21–32)
COLOR UR: YELLOW
CREAT BLD-MCNC: 1.06 MG/DL
DEPRECATED RDW RBC AUTO: 42.6 FL (ref 35.1–46.3)
EOSINOPHIL # BLD AUTO: 0.5 X10(3) UL (ref 0–0.7)
EOSINOPHIL NFR BLD AUTO: 2.8 %
ERYTHROCYTE [DISTWIDTH] IN BLOOD BY AUTOMATED COUNT: 15.8 % (ref 11–15)
GLUCOSE BLD-MCNC: 78 MG/DL (ref 70–99)
GLUCOSE UR-MCNC: NEGATIVE MG/DL
HCT VFR BLD AUTO: 39.3 %
HGB BLD-MCNC: 12.4 G/DL
HGB UR QL STRIP.AUTO: NEGATIVE
HYALINE CASTS #/AREA URNS AUTO: PRESENT /LPF
IMM GRANULOCYTES # BLD AUTO: 0.07 X10(3) UL (ref 0–1)
IMM GRANULOCYTES NFR BLD: 0.4 %
INR BLD: 1.1 (ref 0.9–1.2)
KETONES UR-MCNC: NEGATIVE MG/DL
LYMPHOCYTES # BLD AUTO: 3.08 X10(3) UL (ref 1–4)
LYMPHOCYTES NFR BLD AUTO: 17.4 %
MCH RBC QN AUTO: 23.7 PG (ref 26–34)
MCHC RBC AUTO-ENTMCNC: 31.6 G/DL (ref 31–37)
MCV RBC AUTO: 75 FL
MONOCYTES # BLD AUTO: 1.61 X10(3) UL (ref 0.1–1)
MONOCYTES NFR BLD AUTO: 9.1 %
MRSA DNA SPEC QL NAA+PROBE: NEGATIVE
NEUTROPHILS # BLD AUTO: 12.37 X10 (3) UL (ref 1.5–7.7)
NEUTROPHILS # BLD AUTO: 12.37 X10(3) UL (ref 1.5–7.7)
NEUTROPHILS NFR BLD AUTO: 70 %
NITRITE UR QL STRIP.AUTO: NEGATIVE
OSMOLALITY SERPL CALC.SUM OF ELEC: 286 MOSM/KG (ref 275–295)
PH UR: 5 [PH] (ref 5–8)
PLATELET # BLD AUTO: 301 10(3)UL (ref 150–450)
POTASSIUM SERPL-SCNC: 3.2 MMOL/L (ref 3.5–5.1)
PROT UR-MCNC: NEGATIVE MG/DL
PROTHROMBIN TIME: 14 SECONDS (ref 11.8–14.5)
RBC # BLD AUTO: 5.24 X10(6)UL
SODIUM SERPL-SCNC: 137 MMOL/L (ref 136–145)
SP GR UR STRIP: 1.02 (ref 1–1.03)
UROBILINOGEN UR STRIP-ACNC: <2
WBC # BLD AUTO: 17.7 X10(3) UL (ref 4–11)

## 2021-08-21 PROCEDURE — 99284 EMERGENCY DEPT VISIT MOD MDM: CPT

## 2021-08-21 PROCEDURE — 96374 THER/PROPH/DIAG INJ IV PUSH: CPT

## 2021-08-21 PROCEDURE — 85610 PROTHROMBIN TIME: CPT | Performed by: EMERGENCY MEDICINE

## 2021-08-21 PROCEDURE — 85025 COMPLETE CBC W/AUTO DIFF WBC: CPT | Performed by: EMERGENCY MEDICINE

## 2021-08-21 PROCEDURE — 93010 ELECTROCARDIOGRAM REPORT: CPT | Performed by: EMERGENCY MEDICINE

## 2021-08-21 PROCEDURE — 81001 URINALYSIS AUTO W/SCOPE: CPT | Performed by: EMERGENCY MEDICINE

## 2021-08-21 PROCEDURE — 87641 MR-STAPH DNA AMP PROBE: CPT | Performed by: EMERGENCY MEDICINE

## 2021-08-21 PROCEDURE — 93005 ELECTROCARDIOGRAM TRACING: CPT

## 2021-08-21 PROCEDURE — 80048 BASIC METABOLIC PNL TOTAL CA: CPT | Performed by: EMERGENCY MEDICINE

## 2021-08-21 PROCEDURE — 71045 X-RAY EXAM CHEST 1 VIEW: CPT | Performed by: EMERGENCY MEDICINE

## 2021-08-21 RX ORDER — MORPHINE SULFATE 4 MG/ML
4 INJECTION, SOLUTION INTRAMUSCULAR; INTRAVENOUS ONCE
Status: COMPLETED | OUTPATIENT
Start: 2021-08-21 | End: 2021-08-21

## 2021-08-21 RX ORDER — HYDROCODONE BITARTRATE AND ACETAMINOPHEN 5; 325 MG/1; MG/1
1 TABLET ORAL EVERY 6 HOURS PRN
Qty: 14 TABLET | Refills: 0 | Status: SHIPPED | OUTPATIENT
Start: 2021-08-21 | End: 2021-08-23

## 2021-08-21 NOTE — ED INITIAL ASSESSMENT (HPI)
Pt with pain to her lower back which radiates down her legs, states she is scheduled to have surgery this Tuesday with Dr. Aristides Gallagher. States the pain has became so severe and ran out of her norco pain medication.  States she is unable to walk due to the pain, d

## 2021-08-21 NOTE — ED PROVIDER NOTES
Patient Seen in: [facility] AND [facility] Emergency Department      History   Patient presents with:  Back Pain    Stated Complaint: back pain     HPI/Subjective:   HPI    52-year-old female with chronic back pain presents for evaluation of acute back pain.   Pa respiratory distress. Musculoskeletal:         General: Normal range of motion. Cervical back: Normal range of motion. No rigidity. Neurological:      General: No focal deficit present. Mental Status: She is alert. Motor: No weakness. patient is agreeable to this. She did miss her preop appointment on Friday, is requesting that orders previously ordered for this reason be ordered in the emergency department to facilitate preop clearance.   Orders placed from primary care note on 8/20/20

## 2021-08-23 ENCOUNTER — OFFICE VISIT (OUTPATIENT)
Dept: INTERNAL MEDICINE CLINIC | Facility: CLINIC | Age: 76
End: 2021-08-23
Payer: MEDICARE

## 2021-08-23 ENCOUNTER — LAB ENCOUNTER (OUTPATIENT)
Dept: LAB | Facility: REFERENCE LAB | Age: 76
End: 2021-08-23
Attending: INTERNAL MEDICINE
Payer: MEDICARE

## 2021-08-23 VITALS
DIASTOLIC BLOOD PRESSURE: 78 MMHG | WEIGHT: 182 LBS | HEART RATE: 97 BPM | BODY MASS INDEX: 30.32 KG/M2 | SYSTOLIC BLOOD PRESSURE: 122 MMHG | HEIGHT: 65 IN | OXYGEN SATURATION: 97 %

## 2021-08-23 DIAGNOSIS — M54.16 LUMBAR RADICULOPATHY: ICD-10-CM

## 2021-08-23 DIAGNOSIS — E87.6 HYPOKALEMIA: ICD-10-CM

## 2021-08-23 DIAGNOSIS — U07.1 COVID-19 VIRUS INFECTION: Primary | ICD-10-CM

## 2021-08-23 DIAGNOSIS — Z01.818 PREOP TESTING: ICD-10-CM

## 2021-08-23 DIAGNOSIS — Z01.818 PREOP EXAMINATION: ICD-10-CM

## 2021-08-23 DIAGNOSIS — R82.71 BACTERIA IN URINE: ICD-10-CM

## 2021-08-23 DIAGNOSIS — D72.829 LEUKOCYTOSIS, UNSPECIFIED TYPE: ICD-10-CM

## 2021-08-23 LAB
ANION GAP SERPL CALC-SCNC: 8 MMOL/L (ref 0–18)
ANTIBODY SCREEN: NEGATIVE
BASOPHILS # BLD AUTO: 0.04 X10(3) UL (ref 0–0.2)
BASOPHILS NFR BLD AUTO: 0.3 %
BILIRUB UR QL: NEGATIVE
BUN BLD-MCNC: 18 MG/DL (ref 7–18)
BUN/CREAT SERPL: 18.9 (ref 10–20)
CALCIUM BLD-MCNC: 9.6 MG/DL (ref 8.5–10.1)
CHLORIDE SERPL-SCNC: 103 MMOL/L (ref 98–112)
CO2 SERPL-SCNC: 27 MMOL/L (ref 21–32)
COLOR UR: YELLOW
CREAT BLD-MCNC: 0.95 MG/DL
DEPRECATED RDW RBC AUTO: 43 FL (ref 35.1–46.3)
EOSINOPHIL # BLD AUTO: 0.88 X10(3) UL (ref 0–0.7)
EOSINOPHIL NFR BLD AUTO: 5.8 %
ERYTHROCYTE [DISTWIDTH] IN BLOOD BY AUTOMATED COUNT: 15.8 % (ref 11–15)
GLUCOSE BLD-MCNC: 73 MG/DL (ref 70–99)
GLUCOSE UR-MCNC: NEGATIVE MG/DL
HCT VFR BLD AUTO: 41.5 %
HGB BLD-MCNC: 12.9 G/DL
HGB UR QL STRIP.AUTO: NEGATIVE
HYALINE CASTS #/AREA URNS AUTO: PRESENT /LPF
IMM GRANULOCYTES # BLD AUTO: 0.08 X10(3) UL (ref 0–1)
IMM GRANULOCYTES NFR BLD: 0.5 %
INR BLD: 1.11 (ref 0.9–1.2)
KETONES UR-MCNC: NEGATIVE MG/DL
LYMPHOCYTES # BLD AUTO: 2.93 X10(3) UL (ref 1–4)
LYMPHOCYTES NFR BLD AUTO: 19.2 %
MCH RBC QN AUTO: 23.4 PG (ref 26–34)
MCHC RBC AUTO-ENTMCNC: 31.1 G/DL (ref 31–37)
MCV RBC AUTO: 75.3 FL
MONOCYTES # BLD AUTO: 1.49 X10(3) UL (ref 0.1–1)
MONOCYTES NFR BLD AUTO: 9.8 %
NEUTROPHILS # BLD AUTO: 9.81 X10 (3) UL (ref 1.5–7.7)
NEUTROPHILS # BLD AUTO: 9.81 X10(3) UL (ref 1.5–7.7)
NEUTROPHILS NFR BLD AUTO: 64.4 %
NITRITE UR QL STRIP.AUTO: NEGATIVE
OSMOLALITY SERPL CALC.SUM OF ELEC: 286 MOSM/KG (ref 275–295)
PATIENT FASTING Y/N/NP: NO
PH UR: 5 [PH] (ref 5–8)
PLATELET # BLD AUTO: 312 10(3)UL (ref 150–450)
POTASSIUM SERPL-SCNC: 3.4 MMOL/L (ref 3.5–5.1)
PROT UR-MCNC: NEGATIVE MG/DL
PROTHROMBIN TIME: 14.1 SECONDS (ref 11.8–14.5)
RBC # BLD AUTO: 5.51 X10(6)UL
RH BLOOD TYPE: POSITIVE
SARS-COV-2 RNA RESP QL NAA+PROBE: DETECTED
SODIUM SERPL-SCNC: 138 MMOL/L (ref 136–145)
SP GR UR STRIP: 1.02 (ref 1–1.03)
UROBILINOGEN UR STRIP-ACNC: <2
WBC # BLD AUTO: 15.2 X10(3) UL (ref 4–11)

## 2021-08-23 PROCEDURE — 86850 RBC ANTIBODY SCREEN: CPT

## 2021-08-23 PROCEDURE — 1111F DSCHRG MED/CURRENT MED MERGE: CPT | Performed by: INTERNAL MEDICINE

## 2021-08-23 PROCEDURE — 80048 BASIC METABOLIC PNL TOTAL CA: CPT

## 2021-08-23 PROCEDURE — 87641 MR-STAPH DNA AMP PROBE: CPT

## 2021-08-23 PROCEDURE — 85610 PROTHROMBIN TIME: CPT

## 2021-08-23 PROCEDURE — 85060 BLOOD SMEAR INTERPRETATION: CPT

## 2021-08-23 PROCEDURE — 86900 BLOOD TYPING SEROLOGIC ABO: CPT

## 2021-08-23 PROCEDURE — 86901 BLOOD TYPING SEROLOGIC RH(D): CPT

## 2021-08-23 PROCEDURE — 81001 URINALYSIS AUTO W/SCOPE: CPT

## 2021-08-23 PROCEDURE — 36415 COLL VENOUS BLD VENIPUNCTURE: CPT

## 2021-08-23 PROCEDURE — 99214 OFFICE O/P EST MOD 30 MIN: CPT | Performed by: INTERNAL MEDICINE

## 2021-08-23 PROCEDURE — 85025 COMPLETE CBC W/AUTO DIFF WBC: CPT

## 2021-08-23 RX ORDER — ACETAMINOPHEN 500 MG
1000 TABLET ORAL ONCE
Status: CANCELLED | OUTPATIENT
Start: 2021-08-23 | End: 2021-08-23

## 2021-08-23 RX ORDER — SODIUM CHLORIDE, SODIUM LACTATE, POTASSIUM CHLORIDE, CALCIUM CHLORIDE 600; 310; 30; 20 MG/100ML; MG/100ML; MG/100ML; MG/100ML
INJECTION, SOLUTION INTRAVENOUS CONTINUOUS
Status: CANCELLED | OUTPATIENT
Start: 2021-08-23

## 2021-08-23 RX ORDER — FAMOTIDINE 20 MG/1
20 TABLET ORAL ONCE
Status: CANCELLED | OUTPATIENT
Start: 2021-08-23 | End: 2021-08-23

## 2021-08-23 RX ORDER — METOCLOPRAMIDE 10 MG/1
10 TABLET ORAL ONCE
Status: CANCELLED | OUTPATIENT
Start: 2021-08-23 | End: 2021-08-23

## 2021-08-23 NOTE — PAT NURSING NOTE
Spoke with Jasmine Sanchez at Dr. Linda Fernandez office . Surgery is cancelled due to (+) Covid result from 8/21/2021 .

## 2021-08-23 NOTE — PROGRESS NOTES
Ji Ji is a 77year old female who presents for a pre-operative physical exam. Patient is to have a 425 extreme lateral interbody fusion with metallic cage and cadaver bone graft through the site, plus L4 inferior L3 laminectomy, foraminotomy - 15.0 % 15.8 (H)    Platelet Count      147.2 - 450.0 10(3)uL 301.0    Prelim Neutrophil Abs      1.50 - 7.70 x10 (3) uL 12.37 (H)    Neutrophils Absolute      1.50 - 7.70 x10(3) uL 12.37 (H)    Lymphocytes Absolute      1.00 - 4.00 x10(3) uL 3.08    Mono PT      11.8 - 14.5 seconds 14.0    INR      0.90 - 1.20 1.10        SARS-CoV-2 by PCR Tony Kim  Order: [id number]  Collected:  8/21/2021  4:57 PM Status:  Final result  Specimen Information: Nares;  Other         0 Result Notes  Component   Ref Range & U Take 4 mg by mouth daily as needed.  ) 90 capsule 1   • Multiple Vitamins-Calcium (ONE-A-DAY WOMENS FORMULA) Oral Tab Take  by mouth.  One-A-Day Womens Formula 18 mg iron-400 mcg-500 mg Ca tablet        Allergies:   Flexeril [Cyclobenz*    HIVES   Past Medi Surgical procedure risk: low  5) Functional Capacity: >4 METs: able to climb 3 flight of stairs when back pain not present.       EXAM:   /78 (BP Location: Right arm, Patient Position: Sitting, Cuff Size: adult)   Pulse 97   Ht 5' 5\" (1.651 m)   Wt 1

## 2021-08-23 NOTE — PAT NURSING NOTE
Spoke with Cait Rivera at Dr. Patel Keep office regarding patient abnormal lab work and abnormal EKG from Saturday 8/21.  Cait Rivera stated patient was to see Dr. Malia Enrique early today and will follow up regarding abnormal lab work and abnormal EKG

## 2021-08-24 ENCOUNTER — TELEPHONE (OUTPATIENT)
Dept: NEUROLOGY | Facility: CLINIC | Age: 76
End: 2021-08-24

## 2021-08-24 ENCOUNTER — TELEPHONE (OUTPATIENT)
Dept: INTERNAL MEDICINE CLINIC | Facility: CLINIC | Age: 76
End: 2021-08-24

## 2021-08-24 ENCOUNTER — TELEPHONE (OUTPATIENT)
Dept: FAMILY MEDICINE CLINIC | Facility: CLINIC | Age: 76
End: 2021-08-24

## 2021-08-24 ENCOUNTER — HOSPITAL ENCOUNTER (OUTPATIENT)
Facility: HOSPITAL | Age: 76
Setting detail: OBSERVATION
Discharge: HOME | End: 2021-08-26
Attending: HOSPITALIST | Admitting: HOSPITALIST
Payer: MEDICARE

## 2021-08-24 PROBLEM — M48.061 LUMBAR SPINAL STENOSIS: Status: ACTIVE | Noted: 2021-08-24

## 2021-08-24 LAB
MRSA DNA SPEC QL NAA+PROBE: NEGATIVE
SARS-COV-2 RNA RESP QL NAA+PROBE: NOT DETECTED

## 2021-08-24 PROCEDURE — 99220 INITIAL OBSERVATION CARE,LEVL III: CPT | Performed by: HOSPITALIST

## 2021-08-24 RX ORDER — ONDANSETRON 2 MG/ML
4 INJECTION INTRAMUSCULAR; INTRAVENOUS EVERY 6 HOURS PRN
Status: DISCONTINUED | OUTPATIENT
Start: 2021-08-24 | End: 2021-08-26

## 2021-08-24 RX ORDER — PANTOPRAZOLE SODIUM 40 MG/1
40 TABLET, DELAYED RELEASE ORAL
Refills: 11 | Status: DISCONTINUED | OUTPATIENT
Start: 2021-08-25 | End: 2021-08-26

## 2021-08-24 RX ORDER — ACETAMINOPHEN 325 MG/1
650 TABLET ORAL EVERY 6 HOURS PRN
Status: DISCONTINUED | OUTPATIENT
Start: 2021-08-24 | End: 2021-08-26

## 2021-08-24 RX ORDER — MORPHINE SULFATE 2 MG/ML
1 INJECTION, SOLUTION INTRAMUSCULAR; INTRAVENOUS EVERY 2 HOUR PRN
Status: DISCONTINUED | OUTPATIENT
Start: 2021-08-24 | End: 2021-08-25

## 2021-08-24 RX ORDER — ACETAMINOPHEN 500 MG
1000 TABLET ORAL ONCE
Status: DISCONTINUED | OUTPATIENT
Start: 2021-08-24 | End: 2021-08-26

## 2021-08-24 RX ORDER — METOCLOPRAMIDE 10 MG/1
10 TABLET ORAL ONCE
Status: DISCONTINUED | OUTPATIENT
Start: 2021-08-24 | End: 2021-08-26

## 2021-08-24 RX ORDER — METOCLOPRAMIDE HYDROCHLORIDE 5 MG/ML
10 INJECTION INTRAMUSCULAR; INTRAVENOUS EVERY 8 HOURS PRN
Status: DISCONTINUED | OUTPATIENT
Start: 2021-08-24 | End: 2021-08-26

## 2021-08-24 RX ORDER — MORPHINE SULFATE 2 MG/ML
2 INJECTION, SOLUTION INTRAMUSCULAR; INTRAVENOUS EVERY 2 HOUR PRN
Status: DISCONTINUED | OUTPATIENT
Start: 2021-08-24 | End: 2021-08-25

## 2021-08-24 RX ORDER — GABAPENTIN 300 MG/1
300 CAPSULE ORAL 3 TIMES DAILY
Status: DISCONTINUED | OUTPATIENT
Start: 2021-08-24 | End: 2021-08-25

## 2021-08-24 RX ORDER — LORAZEPAM 1 MG/1
1 TABLET ORAL
Status: DISCONTINUED | OUTPATIENT
Start: 2021-08-24 | End: 2021-08-26

## 2021-08-24 RX ORDER — SODIUM CHLORIDE, SODIUM LACTATE, POTASSIUM CHLORIDE, CALCIUM CHLORIDE 600; 310; 30; 20 MG/100ML; MG/100ML; MG/100ML; MG/100ML
INJECTION, SOLUTION INTRAVENOUS CONTINUOUS
Status: DISCONTINUED | OUTPATIENT
Start: 2021-08-24 | End: 2021-08-26

## 2021-08-24 RX ORDER — FAMOTIDINE 20 MG/1
20 TABLET ORAL ONCE
Status: DISCONTINUED | OUTPATIENT
Start: 2021-08-24 | End: 2021-08-26

## 2021-08-24 RX ORDER — ALBUTEROL SULFATE 90 UG/1
2 AEROSOL, METERED RESPIRATORY (INHALATION) EVERY 6 HOURS PRN
Status: DISCONTINUED | OUTPATIENT
Start: 2021-08-24 | End: 2021-08-26

## 2021-08-24 RX ORDER — MORPHINE SULFATE 4 MG/ML
4 INJECTION, SOLUTION INTRAMUSCULAR; INTRAVENOUS EVERY 2 HOUR PRN
Status: DISCONTINUED | OUTPATIENT
Start: 2021-08-24 | End: 2021-08-25

## 2021-08-24 NOTE — TELEPHONE ENCOUNTER
pts daughter called stating that pt is in a lot of pain and wants to know if doctor can prescribe any medication for her   To please call her back and advise

## 2021-08-24 NOTE — H&P
[facility]    PATIENT'S NAME: Vika Patterson   ATTENDING PHYSICIAN: Abrahan Luna MD   PATIENT ACCOUNT#:   [de-identified]    LOCATION:  [address] [place] [address] RECORD #:   [id number]       YOB: 1945  ADMISSION DATE:       08/24/2 review of systems negative. PHYSICAL EXAMINATION:    GENERAL:  Alert, oriented to time, place, and person, moderate distress. VITAL SIGNS:  Temperature 97.8, pulse 93, respiratory rate 16, blood pressure 142/93, pulse ox 99% on room air.   HEENT:  Atr

## 2021-08-24 NOTE — TELEPHONE ENCOUNTER
Intractable low back pain due to lumbar radiculopathy, not responsive to po Norco. Scheduled for   Laminectomy,L4--5 disectomy and decompression today. Procedure cancelled due to positive Covid  Test on 8/21/21.      Repeat Covid test was negative on 8/23/21

## 2021-08-24 NOTE — TELEPHONE ENCOUNTER
Discussed with Dr. Walt Aguiar like for patient to be a direct admit for pain control. Per Dr. Gricelda Levine, she would call the daughter herself.

## 2021-08-25 ENCOUNTER — APPOINTMENT (OUTPATIENT)
Dept: GENERAL RADIOLOGY | Facility: HOSPITAL | Age: 76
End: 2021-08-25
Attending: HOSPITALIST
Payer: MEDICARE

## 2021-08-25 ENCOUNTER — APPOINTMENT (OUTPATIENT)
Dept: CT IMAGING | Facility: HOSPITAL | Age: 76
End: 2021-08-25
Attending: HOSPITALIST
Payer: MEDICARE

## 2021-08-25 LAB
ANION GAP SERPL CALC-SCNC: 2 MMOL/L (ref 0–18)
BASOPHILS # BLD AUTO: 0.03 X10(3) UL (ref 0–0.2)
BASOPHILS NFR BLD AUTO: 0.3 %
BUN BLD-MCNC: 18 MG/DL (ref 7–18)
BUN/CREAT SERPL: 18.8 (ref 10–20)
CALCIUM BLD-MCNC: 9 MG/DL (ref 8.5–10.1)
CHLORIDE SERPL-SCNC: 103 MMOL/L (ref 98–112)
CO2 SERPL-SCNC: 30 MMOL/L (ref 21–32)
CREAT BLD-MCNC: 0.96 MG/DL
DEPRECATED RDW RBC AUTO: 40.9 FL (ref 35.1–46.3)
EOSINOPHIL # BLD AUTO: 0.95 X10(3) UL (ref 0–0.7)
EOSINOPHIL NFR BLD AUTO: 9.8 %
ERYTHROCYTE [DISTWIDTH] IN BLOOD BY AUTOMATED COUNT: 15.5 % (ref 11–15)
GLUCOSE BLD-MCNC: 113 MG/DL (ref 70–99)
HCT VFR BLD AUTO: 38.8 %
HGB BLD-MCNC: 12.4 G/DL
IMM GRANULOCYTES # BLD AUTO: 0.04 X10(3) UL (ref 0–1)
IMM GRANULOCYTES NFR BLD: 0.4 %
LYMPHOCYTES # BLD AUTO: 2.51 X10(3) UL (ref 1–4)
LYMPHOCYTES NFR BLD AUTO: 26 %
MCH RBC QN AUTO: 23.6 PG (ref 26–34)
MCHC RBC AUTO-ENTMCNC: 32 G/DL (ref 31–37)
MCV RBC AUTO: 73.8 FL
MONOCYTES # BLD AUTO: 0.93 X10(3) UL (ref 0.1–1)
MONOCYTES NFR BLD AUTO: 9.6 %
NEUTROPHILS # BLD AUTO: 5.21 X10 (3) UL (ref 1.5–7.7)
NEUTROPHILS # BLD AUTO: 5.21 X10(3) UL (ref 1.5–7.7)
NEUTROPHILS NFR BLD AUTO: 53.9 %
OSMOLALITY SERPL CALC.SUM OF ELEC: 283 MOSM/KG (ref 275–295)
PLATELET # BLD AUTO: 295 10(3)UL (ref 150–450)
POTASSIUM SERPL-SCNC: 4.3 MMOL/L (ref 3.5–5.1)
RBC # BLD AUTO: 5.26 X10(6)UL
SODIUM SERPL-SCNC: 135 MMOL/L (ref 136–145)
WBC # BLD AUTO: 9.7 X10(3) UL (ref 4–11)

## 2021-08-25 PROCEDURE — 71260 CT THORAX DX C+: CPT | Performed by: HOSPITALIST

## 2021-08-25 PROCEDURE — 99226 SUBSEQUENT OBSERVATION CARE: CPT | Performed by: HOSPITALIST

## 2021-08-25 PROCEDURE — 71045 X-RAY EXAM CHEST 1 VIEW: CPT | Performed by: HOSPITALIST

## 2021-08-25 RX ORDER — METOPROLOL TARTRATE 5 MG/5ML
2.5 INJECTION INTRAVENOUS
Status: DISCONTINUED | OUTPATIENT
Start: 2021-08-25 | End: 2021-08-26

## 2021-08-25 RX ORDER — METOPROLOL TARTRATE 5 MG/5ML
5 INJECTION INTRAVENOUS
Status: DISCONTINUED | OUTPATIENT
Start: 2021-08-25 | End: 2021-08-26

## 2021-08-25 RX ORDER — METOPROLOL TARTRATE 5 MG/5ML
2.5 INJECTION INTRAVENOUS AS NEEDED
Status: DISCONTINUED | OUTPATIENT
Start: 2021-08-25 | End: 2021-08-26

## 2021-08-25 RX ORDER — METOPROLOL TARTRATE 50 MG/1
50 TABLET, FILM COATED ORAL
Status: DISCONTINUED | OUTPATIENT
Start: 2021-08-25 | End: 2021-08-26

## 2021-08-25 RX ORDER — BISACODYL 10 MG
10 SUPPOSITORY, RECTAL RECTAL
Status: DISCONTINUED | OUTPATIENT
Start: 2021-08-25 | End: 2021-08-26

## 2021-08-25 RX ORDER — HYDROMORPHONE HYDROCHLORIDE 1 MG/ML
0.5 INJECTION, SOLUTION INTRAMUSCULAR; INTRAVENOUS; SUBCUTANEOUS
Status: DISCONTINUED | OUTPATIENT
Start: 2021-08-25 | End: 2021-08-26

## 2021-08-25 RX ORDER — POLYETHYLENE GLYCOL 3350 17 G/17G
17 POWDER, FOR SOLUTION ORAL DAILY PRN
Status: DISCONTINUED | OUTPATIENT
Start: 2021-08-25 | End: 2021-08-26

## 2021-08-25 RX ORDER — GABAPENTIN 400 MG/1
400 CAPSULE ORAL 3 TIMES DAILY
Status: DISCONTINUED | OUTPATIENT
Start: 2021-08-25 | End: 2021-08-26

## 2021-08-25 RX ORDER — METOPROLOL TARTRATE 5 MG/5ML
5 INJECTION INTRAVENOUS AS NEEDED
Status: DISCONTINUED | OUTPATIENT
Start: 2021-08-25 | End: 2021-08-26

## 2021-08-25 RX ORDER — HYDROCODONE BITARTRATE AND ACETAMINOPHEN 10; 325 MG/1; MG/1
1 TABLET ORAL EVERY 4 HOURS PRN
Status: DISCONTINUED | OUTPATIENT
Start: 2021-08-25 | End: 2021-08-26

## 2021-08-25 RX ORDER — HYDROMORPHONE HYDROCHLORIDE 1 MG/ML
0.3 INJECTION, SOLUTION INTRAMUSCULAR; INTRAVENOUS; SUBCUTANEOUS
Status: DISCONTINUED | OUTPATIENT
Start: 2021-08-25 | End: 2021-08-26

## 2021-08-25 RX ORDER — DOCUSATE SODIUM 100 MG/1
100 CAPSULE, LIQUID FILLED ORAL 2 TIMES DAILY
Status: DISCONTINUED | OUTPATIENT
Start: 2021-08-25 | End: 2021-08-26

## 2021-08-25 NOTE — PLAN OF CARE
Problem: Patient Centered Care  Goal: Patient preferences are identified and integrated in the patient's plan of care  Description: Interventions:  - What would you like us to know as we care for you?  Recent admission earlier this month  - Provide timely Absence of fever/infection during anticipated neutropenic period  Description: INTERVENTIONS  - Monitor WBC  - Administer growth factors as ordered  - Implement neutropenic guidelines  Outcome: Progressing     Problem: SAFETY ADULT - FALL  Goal: Free from intracranial pressure  - Maintain blood pressure and fluid volume within ordered parameters to optimize cerebral perfusion and minimize risk of hemorrhage  - Monitor temperature, glucose, and sodium.  Initiate appropriate interventions as ordered  Outcome:

## 2021-08-25 NOTE — CM/SW NOTE
Dx: Lumbar spinal stenosis and severe lumbar radiculopathy. Pt lives alone, has dtr. Pt indep ADLs. Per nursing on IV dilaudid for pain control. PLAN; TBD    CM/SW to remain available for support and/or discharge planning.     Sadiq Ybarra RN, CC

## 2021-08-25 NOTE — TELEPHONE ENCOUNTER
Conversation  [employer])  Samantha Mayer MD   8/24/21 2:45 PM  Note     Intractable low back pain due to lumbar radiculopathy, not responsive to po Norco. Scheduled for   Laminectomy,L4--5 disectomy and decompression today. Procedure cancelled d

## 2021-08-25 NOTE — CONSULTS
Neurosurgery Consult Note    _______________________ PATIENT HISTORY _______________________    CC: Leg pains    HPI: A neurosurgery consult was requested by Dr Eduardo Felix.   The patient is a 71-year-old -American female who has been seeing Dr. Marcell Chavez 0.0 standard drinks        Comment: social/occ      Drug use: No      Sexual activity: Not Currently    Other Topics      Concerns:         Service: Not Asked        Blood Transfusions: Not Asked        Caffeine Concern: Yes        Occupational Exp tablet by mouth every 4 (four) hours as needed. , Disp: 42 tablet, Rfl: 0  predniSONE 5 MG Oral Tab, Take 6 now, 6 Q AM x 1 day, 5 Q AM x 2 days, 4 Q AM x 2, 3 Q AM x 2, 2 Q AM x 2, and then 1 Q AM., Disp: 100 tablet, Rfl: 1  Albuterol Sulfate  (90 B LOWER EXTR LEFT RIGHT   Deltoid 5 5 Hip flex 5 5   Biceps 5 5 Knee ext 5 5   Triceps 5 5 Dorsiflex.  5 5   Hand 5 5 Plantarflex 5 5     Inspection, palpation and percussion of joints, bone, and muscle/tendons noted above reveal no misalignment, asymmetry, t bulging disk, facet, and ligamentous hypertrophy.  There is complete effacement of the dorsal and ventral CSF spaces with no normal intrathecal CSF signal visualized, resulting in critical stenosis of the canal. There    is proximal bunching of the nerve ro 75.3*   MCH 23.7* 23.4*   MCHC 31.6 31.1   RDW 15.8* 15.8*   NEPRELIM 12.37* 9.81*   WBC 17.7* 15.2*   .0 312.0       Recent Labs   Lab 08/21/21  1646 08/23/21  1317   INR 1.10 1.11         DIAGNOSES:    Lumbar spondylosis and stenosis L4-5        A

## 2021-08-25 NOTE — PROGRESS NOTES
[facility]    Progress Note    Chantel Gomez Patient Status:  Observation    1945 MRN [id number]   Location [address] [facility] Attending Autumn Grijalva MD   Hosp Day # 0 PCP Nelsy Medina MD       Subjective:   Valerie Avina Dictated by (CST): Kaleb Prince MD on 8/25/2021 at 11:30 AM     Finalized by (CST):  Kaleb Prince MD on 8/25/2021 at 11:33 AM                  Assessment and Plan:      Lumbar spinal stenosis  -with severe lumbar radiculopathy  -plan pain control

## 2021-08-25 NOTE — PLAN OF CARE
Patient having pain in her legs that is relieved by dilaudid. Patient on general diet. Norco added and increased dose of gabapentin. Patient on tele and continuous pulse ox. Patient up to chair and cleaned up this afternoon.  Plan was to perform procedure o evaluate response  - Consider cultural and social influences on pain and pain management  - Manage/alleviate anxiety  - Utilize distraction and/or relaxation techniques  - Monitor for opioid side effects  - Notify MD/LIP if interventions unsuccessful or pa alignment per provider's orders  - Instruct and reinforce with patient and family use of appropriate assistive device and precautions (e.g. spinal or hip dislocation precautions)  Outcome: Progressing     Problem: NEUROLOGICAL - ADULT  Goal: Achieves stabl

## 2021-08-26 ENCOUNTER — APPOINTMENT (OUTPATIENT)
Dept: CV DIAGNOSTICS | Facility: HOSPITAL | Age: 76
End: 2021-08-26
Attending: HOSPITALIST
Payer: MEDICARE

## 2021-08-26 VITALS
HEART RATE: 80 BPM | DIASTOLIC BLOOD PRESSURE: 44 MMHG | TEMPERATURE: 98 F | SYSTOLIC BLOOD PRESSURE: 128 MMHG | OXYGEN SATURATION: 97 % | RESPIRATION RATE: 18 BRPM

## 2021-08-26 PROCEDURE — 93306 TTE W/DOPPLER COMPLETE: CPT | Performed by: HOSPITALIST

## 2021-08-26 PROCEDURE — 99217 OBSERVATION CARE DISCHARGE: CPT | Performed by: HOSPITALIST

## 2021-08-26 RX ORDER — GABAPENTIN 400 MG/1
400 CAPSULE ORAL 3 TIMES DAILY
Qty: 90 CAPSULE | Refills: 0 | Status: SHIPPED | OUTPATIENT
Start: 2021-08-26 | End: 2021-09-25

## 2021-08-26 RX ORDER — POLYETHYLENE GLYCOL 3350 17 G/17G
17 POWDER, FOR SOLUTION ORAL DAILY PRN
Qty: 30 EACH | Refills: 0 | Status: SHIPPED | OUTPATIENT
Start: 2021-08-26 | End: 2021-10-05 | Stop reason: ALTCHOICE

## 2021-08-26 RX ORDER — HYDROCODONE BITARTRATE AND ACETAMINOPHEN 10; 325 MG/1; MG/1
1 TABLET ORAL EVERY 4 HOURS PRN
Qty: 20 TABLET | Refills: 0 | Status: SHIPPED | OUTPATIENT
Start: 2021-08-26 | End: 2021-08-31

## 2021-08-26 NOTE — CM/SW NOTE
Pt indep ADLs. self care     Per nursing on IV dilaudid for pain control.     PLAN; anticipate home self care    CM/SW to remain available for support and/or discharge planning.     Masoud Catherine RN, [facility]    Ext. [phone]

## 2021-08-26 NOTE — DISCHARGE SUMMARY
[facility]    Discharge Summary    Chantel Gomez Patient Status:  Observation    1945 MRN [id number]   Location [address] [facility] Attending Autumn Grijalva MD   Hosp Day # 0 PCP Nelsy Medina MD     Date of Admission:  PRESENT ILLNESS:  The patient is a 67-year-old [facility] access hospital American female with known underlying severe lumbar spinal stenosis, was scheduled for lumbar laminectomy procedure today but tested positive for COVID on August 21.   She was retested yesterday and her · medication strength  · how much to take  · how to take this  · when to take this  · additional instructions      Take 1 capsule (400 mg total) by mouth in the morning, at noon, and at bedtime.    Stop taking on: September 25, 2021  Quantity: 90 capsule

## 2021-08-26 NOTE — PROGRESS NOTES
Patient surgery delayed given recent positive COVID results. She will be discharged after pre-op w/u has been completed and when pain control established. Please have her f/u with Dr. King Galindo in clinic for likely surgery in early September.   D/w Dr. Phong Cummins

## 2021-08-26 NOTE — PLAN OF CARE
A/Ox4. Fall risk precautions appropriate for pt: bed in lowest position, call light within reach, non-skid socks. Norco given for pain  Alarm parameters appropriate for pt: bed/chair alarm on. Pt cleared for DC.  DC instructions provide: pt voices underst INTERVENTIONS:  - Encourage pt to monitor pain and request assistance  - Assess pain using appropriate pain scale  - Administer analgesics based on type and severity of pain and evaluate response  - Implement non-pharmacological measures as appropriate and function  Description: INTERVENTIONS:  - Assess patient stability and activity tolerance for standing, transferring and ambulating w/ or w/o assistive devices  - Assist with transfers and ambulation using safe patient handling equipment as needed  - Ensure precautions  - Recommend use of chair position in bed 3 times per day  8/26/2021 1633 by Oskar Vines RN  Outcome: Adequate for Discharge  8/26/2021 1150 by Oskar Vines RN  Outcome: Progressing

## 2021-08-26 NOTE — PLAN OF CARE
Problem: Patient Centered Care  Goal: Patient preferences are identified and integrated in the patient's plan of care  Description: Interventions:  - What would you like us to know as we care for you?  Recent admission earlier this month  - Provide timely Absence of fever/infection during anticipated neutropenic period  Description: INTERVENTIONS  - Monitor WBC  - Administer growth factors as ordered  - Implement neutropenic guidelines  Outcome: Progressing     Problem: SAFETY ADULT - FALL  Goal: Free from pressure  - Maintain blood pressure and fluid volume within ordered parameters to optimize cerebral perfusion and minimize risk of hemorrhage  - Monitor temperature, glucose, and sodium.  Initiate appropriate interventions as ordered  Outcome: Progressing

## 2021-08-31 ENCOUNTER — TELEPHONE (OUTPATIENT)
Dept: INTERNAL MEDICINE CLINIC | Facility: CLINIC | Age: 76
End: 2021-08-31

## 2021-08-31 RX ORDER — HYDROCODONE BITARTRATE AND ACETAMINOPHEN 10; 325 MG/1; MG/1
1 TABLET ORAL EVERY 6 HOURS PRN
Qty: 40 TABLET | Refills: 0 | Status: SHIPPED | OUTPATIENT
Start: 2021-08-31 | End: 2021-09-17

## 2021-09-08 RX ORDER — NAPROXEN SODIUM 220 MG
1-2 TABLET ORAL 2 TIMES DAILY
Status: ON HOLD | COMMUNITY
End: 2021-09-17

## 2021-09-10 ENCOUNTER — LAB ENCOUNTER (OUTPATIENT)
Dept: LAB | Facility: HOSPITAL | Age: 76
End: 2021-09-10
Attending: NEUROLOGICAL SURGERY
Payer: MEDICARE

## 2021-09-10 DIAGNOSIS — Z01.818 PREOP TESTING: ICD-10-CM

## 2021-09-10 LAB
ANTIBODY SCREEN: NEGATIVE
RH BLOOD TYPE: POSITIVE

## 2021-09-10 PROCEDURE — 86850 RBC ANTIBODY SCREEN: CPT

## 2021-09-10 PROCEDURE — 36415 COLL VENOUS BLD VENIPUNCTURE: CPT

## 2021-09-10 PROCEDURE — 86901 BLOOD TYPING SEROLOGIC RH(D): CPT

## 2021-09-10 PROCEDURE — 86900 BLOOD TYPING SEROLOGIC ABO: CPT

## 2021-09-11 ENCOUNTER — LAB ENCOUNTER (OUTPATIENT)
Dept: LAB | Facility: HOSPITAL | Age: 76
DRG: 460 | End: 2021-09-11
Attending: NEUROLOGICAL SURGERY
Payer: MEDICARE

## 2021-09-11 DIAGNOSIS — Z01.818 PREOP TESTING: ICD-10-CM

## 2021-09-12 LAB — SARS-COV-2 RNA RESP QL NAA+PROBE: NOT DETECTED

## 2021-09-14 ENCOUNTER — ANESTHESIA EVENT (OUTPATIENT)
Dept: SURGERY | Facility: HOSPITAL | Age: 76
DRG: 460 | End: 2021-09-14
Payer: MEDICARE

## 2021-09-14 ENCOUNTER — HOSPITAL ENCOUNTER (INPATIENT)
Facility: HOSPITAL | Age: 76
LOS: 3 days | Discharge: HOME HEALTH | DRG: 460 | End: 2021-09-17
Attending: NEUROLOGICAL SURGERY | Admitting: NEUROLOGICAL SURGERY
Payer: MEDICARE

## 2021-09-14 ENCOUNTER — APPOINTMENT (OUTPATIENT)
Dept: GENERAL RADIOLOGY | Facility: HOSPITAL | Age: 76
DRG: 460 | End: 2021-09-14
Attending: NEUROLOGICAL SURGERY
Payer: MEDICARE

## 2021-09-14 ENCOUNTER — ANESTHESIA (OUTPATIENT)
Dept: SURGERY | Facility: HOSPITAL | Age: 76
DRG: 460 | End: 2021-09-14
Payer: MEDICARE

## 2021-09-14 DIAGNOSIS — Z01.818 PREOP TESTING: Primary | ICD-10-CM

## 2021-09-14 PROCEDURE — XRGB0F3 FUSION OF LUMBAR VERTEBRAL JOINT USING RADIOLUCENT POROUS INTERBODY FUSION DEVICE, OPEN APPROACH, NEW TECHNOLOGY GROUP 3: ICD-10-PCS | Performed by: NEUROLOGICAL SURGERY

## 2021-09-14 PROCEDURE — 4A11X4G MONITORING OF PERIPHERAL NERVOUS ELECTRICAL ACTIVITY, INTRAOPERATIVE, EXTERNAL APPROACH: ICD-10-PCS | Performed by: ANESTHESIOLOGY

## 2021-09-14 PROCEDURE — 0SB20ZZ EXCISION OF LUMBAR VERTEBRAL DISC, OPEN APPROACH: ICD-10-PCS | Performed by: NEUROLOGICAL SURGERY

## 2021-09-14 PROCEDURE — 76000 FLUOROSCOPY <1 HR PHYS/QHP: CPT | Performed by: NEUROLOGICAL SURGERY

## 2021-09-14 PROCEDURE — 01NB0ZZ RELEASE LUMBAR NERVE, OPEN APPROACH: ICD-10-PCS | Performed by: NEUROLOGICAL SURGERY

## 2021-09-14 PROCEDURE — 99232 SBSQ HOSP IP/OBS MODERATE 35: CPT | Performed by: HOSPITALIST

## 2021-09-14 DEVICE — OSTEOCEL PRO MEDIUM: Type: IMPLANTABLE DEVICE | Site: BACK | Status: FUNCTIONAL

## 2021-09-14 DEVICE — RELINE MAS MOD REDUCTION EXT: Type: IMPLANTABLE DEVICE | Site: BACK | Status: FUNCTIONAL

## 2021-09-14 DEVICE — RELINE MAS MOD SCREW 5.5X45 2C: Type: IMPLANTABLE DEVICE | Site: BACK | Status: FUNCTIONAL

## 2021-09-14 DEVICE — RELINE MAS TI ROD 5.5X45 LRDTC: Type: IMPLANTABLE DEVICE | Site: BACK | Status: FUNCTIONAL

## 2021-09-14 DEVICE — RELINE LCK SCRW 5.5 OPEN TULIP: Type: IMPLANTABLE DEVICE | Site: BACK | Status: FUNCTIONAL

## 2021-09-14 DEVICE — RELINE MAS TI ROD 5.5X35 LRDTC: Type: IMPLANTABLE DEVICE | Site: BACK | Status: FUNCTIONAL

## 2021-09-14 RX ORDER — ACETAMINOPHEN 500 MG
500 TABLET ORAL EVERY 6 HOURS PRN
COMMUNITY
End: 2021-10-05 | Stop reason: ALTCHOICE

## 2021-09-14 RX ORDER — HYDROMORPHONE HYDROCHLORIDE 1 MG/ML
0.6 INJECTION, SOLUTION INTRAMUSCULAR; INTRAVENOUS; SUBCUTANEOUS EVERY 5 MIN PRN
Status: DISCONTINUED | OUTPATIENT
Start: 2021-09-14 | End: 2021-09-14 | Stop reason: HOSPADM

## 2021-09-14 RX ORDER — MORPHINE SULFATE 10 MG/ML
6 INJECTION, SOLUTION INTRAMUSCULAR; INTRAVENOUS EVERY 10 MIN PRN
Status: DISCONTINUED | OUTPATIENT
Start: 2021-09-14 | End: 2021-09-14 | Stop reason: HOSPADM

## 2021-09-14 RX ORDER — HYDROMORPHONE HYDROCHLORIDE 1 MG/ML
0.2 INJECTION, SOLUTION INTRAMUSCULAR; INTRAVENOUS; SUBCUTANEOUS EVERY 5 MIN PRN
Status: DISCONTINUED | OUTPATIENT
Start: 2021-09-14 | End: 2021-09-14 | Stop reason: HOSPADM

## 2021-09-14 RX ORDER — MIDAZOLAM HYDROCHLORIDE 1 MG/ML
INJECTION INTRAMUSCULAR; INTRAVENOUS AS NEEDED
Status: DISCONTINUED | OUTPATIENT
Start: 2021-09-14 | End: 2021-09-14 | Stop reason: SURG

## 2021-09-14 RX ORDER — HALOPERIDOL 5 MG/ML
0.25 INJECTION INTRAMUSCULAR ONCE AS NEEDED
Status: DISCONTINUED | OUTPATIENT
Start: 2021-09-14 | End: 2021-09-14 | Stop reason: HOSPADM

## 2021-09-14 RX ORDER — SODIUM CHLORIDE, SODIUM LACTATE, POTASSIUM CHLORIDE, CALCIUM CHLORIDE 600; 310; 30; 20 MG/100ML; MG/100ML; MG/100ML; MG/100ML
INJECTION, SOLUTION INTRAVENOUS CONTINUOUS
Status: DISCONTINUED | OUTPATIENT
Start: 2021-09-14 | End: 2021-09-14 | Stop reason: HOSPADM

## 2021-09-14 RX ORDER — PANTOPRAZOLE SODIUM 40 MG/1
40 TABLET, DELAYED RELEASE ORAL
Status: DISCONTINUED | OUTPATIENT
Start: 2021-09-15 | End: 2021-09-17

## 2021-09-14 RX ORDER — HYDROCODONE BITARTRATE AND ACETAMINOPHEN 10; 325 MG/1; MG/1
1 TABLET ORAL EVERY 4 HOURS PRN
Status: DISCONTINUED | OUTPATIENT
Start: 2021-09-14 | End: 2021-09-17

## 2021-09-14 RX ORDER — SODIUM PHOSPHATE, DIBASIC AND SODIUM PHOSPHATE, MONOBASIC 7; 19 G/133ML; G/133ML
1 ENEMA RECTAL ONCE AS NEEDED
Status: DISCONTINUED | OUTPATIENT
Start: 2021-09-14 | End: 2021-09-17

## 2021-09-14 RX ORDER — PROCHLORPERAZINE EDISYLATE 5 MG/ML
10 INJECTION INTRAMUSCULAR; INTRAVENOUS EVERY 6 HOURS PRN
Status: ACTIVE | OUTPATIENT
Start: 2021-09-14 | End: 2021-09-16

## 2021-09-14 RX ORDER — ONDANSETRON 2 MG/ML
INJECTION INTRAMUSCULAR; INTRAVENOUS AS NEEDED
Status: DISCONTINUED | OUTPATIENT
Start: 2021-09-14 | End: 2021-09-14 | Stop reason: SURG

## 2021-09-14 RX ORDER — ACETAMINOPHEN 325 MG/1
650 TABLET ORAL EVERY 4 HOURS PRN
Status: DISCONTINUED | OUTPATIENT
Start: 2021-09-14 | End: 2021-09-17

## 2021-09-14 RX ORDER — GABAPENTIN 100 MG/1
100 CAPSULE ORAL 3 TIMES DAILY
Status: DISCONTINUED | OUTPATIENT
Start: 2021-09-14 | End: 2021-09-14

## 2021-09-14 RX ORDER — FAMOTIDINE 20 MG/1
20 TABLET ORAL ONCE
Status: DISCONTINUED | OUTPATIENT
Start: 2021-09-14 | End: 2021-09-14 | Stop reason: HOSPADM

## 2021-09-14 RX ORDER — ALBUTEROL SULFATE 90 UG/1
2 AEROSOL, METERED RESPIRATORY (INHALATION) EVERY 6 HOURS PRN
Status: DISCONTINUED | OUTPATIENT
Start: 2021-09-14 | End: 2021-09-17

## 2021-09-14 RX ORDER — SODIUM CHLORIDE, SODIUM LACTATE, POTASSIUM CHLORIDE, CALCIUM CHLORIDE 600; 310; 30; 20 MG/100ML; MG/100ML; MG/100ML; MG/100ML
INJECTION, SOLUTION INTRAVENOUS CONTINUOUS
Status: DISCONTINUED | OUTPATIENT
Start: 2021-09-14 | End: 2021-09-17

## 2021-09-14 RX ORDER — LORAZEPAM 1 MG/1
1 TABLET ORAL
Status: DISCONTINUED | OUTPATIENT
Start: 2021-09-14 | End: 2021-09-17

## 2021-09-14 RX ORDER — HYDROMORPHONE HYDROCHLORIDE 1 MG/ML
0.4 INJECTION, SOLUTION INTRAMUSCULAR; INTRAVENOUS; SUBCUTANEOUS EVERY 2 HOUR PRN
Status: DISCONTINUED | OUTPATIENT
Start: 2021-09-14 | End: 2021-09-17

## 2021-09-14 RX ORDER — HYDROMORPHONE HYDROCHLORIDE 1 MG/ML
0.4 INJECTION, SOLUTION INTRAMUSCULAR; INTRAVENOUS; SUBCUTANEOUS EVERY 5 MIN PRN
Status: DISCONTINUED | OUTPATIENT
Start: 2021-09-14 | End: 2021-09-14 | Stop reason: HOSPADM

## 2021-09-14 RX ORDER — MORPHINE SULFATE 4 MG/ML
4 INJECTION, SOLUTION INTRAMUSCULAR; INTRAVENOUS EVERY 10 MIN PRN
Status: DISCONTINUED | OUTPATIENT
Start: 2021-09-14 | End: 2021-09-14 | Stop reason: HOSPADM

## 2021-09-14 RX ORDER — MORPHINE SULFATE 4 MG/ML
2 INJECTION, SOLUTION INTRAMUSCULAR; INTRAVENOUS EVERY 10 MIN PRN
Status: DISCONTINUED | OUTPATIENT
Start: 2021-09-14 | End: 2021-09-14 | Stop reason: HOSPADM

## 2021-09-14 RX ORDER — CEFAZOLIN SODIUM/WATER 2 G/20 ML
2 SYRINGE (ML) INTRAVENOUS ONCE
Status: COMPLETED | OUTPATIENT
Start: 2021-09-14 | End: 2021-09-14

## 2021-09-14 RX ORDER — HYDROMORPHONE HYDROCHLORIDE 1 MG/ML
0.2 INJECTION, SOLUTION INTRAMUSCULAR; INTRAVENOUS; SUBCUTANEOUS EVERY 2 HOUR PRN
Status: DISCONTINUED | OUTPATIENT
Start: 2021-09-14 | End: 2021-09-17

## 2021-09-14 RX ORDER — DOCUSATE SODIUM 100 MG/1
100 CAPSULE, LIQUID FILLED ORAL 2 TIMES DAILY
Status: DISCONTINUED | OUTPATIENT
Start: 2021-09-14 | End: 2021-09-17

## 2021-09-14 RX ORDER — LIDOCAINE HYDROCHLORIDE 10 MG/ML
INJECTION, SOLUTION EPIDURAL; INFILTRATION; INTRACAUDAL; PERINEURAL AS NEEDED
Status: DISCONTINUED | OUTPATIENT
Start: 2021-09-14 | End: 2021-09-14 | Stop reason: SURG

## 2021-09-14 RX ORDER — DIPHENHYDRAMINE HYDROCHLORIDE 50 MG/ML
25 INJECTION INTRAMUSCULAR; INTRAVENOUS EVERY 4 HOURS PRN
Status: DISCONTINUED | OUTPATIENT
Start: 2021-09-14 | End: 2021-09-17

## 2021-09-14 RX ORDER — BISACODYL 10 MG
10 SUPPOSITORY, RECTAL RECTAL
Status: DISCONTINUED | OUTPATIENT
Start: 2021-09-14 | End: 2021-09-17

## 2021-09-14 RX ORDER — POLYETHYLENE GLYCOL 3350 17 G/17G
17 POWDER, FOR SOLUTION ORAL DAILY PRN
Status: DISCONTINUED | OUTPATIENT
Start: 2021-09-14 | End: 2021-09-17

## 2021-09-14 RX ORDER — DEXAMETHASONE SODIUM PHOSPHATE 4 MG/ML
VIAL (ML) INJECTION AS NEEDED
Status: DISCONTINUED | OUTPATIENT
Start: 2021-09-14 | End: 2021-09-14 | Stop reason: SURG

## 2021-09-14 RX ORDER — CEFAZOLIN SODIUM/WATER 2 G/20 ML
2 SYRINGE (ML) INTRAVENOUS EVERY 8 HOURS
Status: DISPENSED | OUTPATIENT
Start: 2021-09-14 | End: 2021-09-15

## 2021-09-14 RX ORDER — SENNOSIDES 8.6 MG
17.2 TABLET ORAL NIGHTLY
Status: DISCONTINUED | OUTPATIENT
Start: 2021-09-14 | End: 2021-09-17

## 2021-09-14 RX ORDER — ONDANSETRON 2 MG/ML
4 INJECTION INTRAMUSCULAR; INTRAVENOUS EVERY 4 HOURS PRN
Status: DISPENSED | OUTPATIENT
Start: 2021-09-14 | End: 2021-09-15

## 2021-09-14 RX ORDER — SODIUM CHLORIDE 9 MG/ML
INJECTION, SOLUTION INTRAVENOUS CONTINUOUS PRN
Status: DISCONTINUED | OUTPATIENT
Start: 2021-09-14 | End: 2021-09-14 | Stop reason: SURG

## 2021-09-14 RX ORDER — HYDROMORPHONE HYDROCHLORIDE 1 MG/ML
0.8 INJECTION, SOLUTION INTRAMUSCULAR; INTRAVENOUS; SUBCUTANEOUS EVERY 2 HOUR PRN
Status: DISCONTINUED | OUTPATIENT
Start: 2021-09-14 | End: 2021-09-17

## 2021-09-14 RX ORDER — NALOXONE HYDROCHLORIDE 0.4 MG/ML
80 INJECTION, SOLUTION INTRAMUSCULAR; INTRAVENOUS; SUBCUTANEOUS AS NEEDED
Status: DISCONTINUED | OUTPATIENT
Start: 2021-09-14 | End: 2021-09-14 | Stop reason: HOSPADM

## 2021-09-14 RX ORDER — HYDROCODONE BITARTRATE AND ACETAMINOPHEN 10; 325 MG/1; MG/1
2 TABLET ORAL EVERY 4 HOURS PRN
Status: DISCONTINUED | OUTPATIENT
Start: 2021-09-14 | End: 2021-09-17

## 2021-09-14 RX ORDER — ONDANSETRON 2 MG/ML
4 INJECTION INTRAMUSCULAR; INTRAVENOUS ONCE AS NEEDED
Status: DISCONTINUED | OUTPATIENT
Start: 2021-09-14 | End: 2021-09-14 | Stop reason: HOSPADM

## 2021-09-14 RX ORDER — POLYETHYLENE GLYCOL 3350 17 G/17G
17 POWDER, FOR SOLUTION ORAL DAILY PRN
Status: DISCONTINUED | OUTPATIENT
Start: 2021-09-14 | End: 2021-09-14

## 2021-09-14 RX ORDER — HYDROCODONE BITARTRATE AND ACETAMINOPHEN 5; 325 MG/1; MG/1
1 TABLET ORAL AS NEEDED
Status: DISCONTINUED | OUTPATIENT
Start: 2021-09-14 | End: 2021-09-14 | Stop reason: HOSPADM

## 2021-09-14 RX ORDER — HYDROCODONE BITARTRATE AND ACETAMINOPHEN 5; 325 MG/1; MG/1
2 TABLET ORAL AS NEEDED
Status: DISCONTINUED | OUTPATIENT
Start: 2021-09-14 | End: 2021-09-14 | Stop reason: HOSPADM

## 2021-09-14 RX ORDER — ACETAMINOPHEN 500 MG
1000 TABLET ORAL ONCE
Status: DISCONTINUED | OUTPATIENT
Start: 2021-09-14 | End: 2021-09-14 | Stop reason: HOSPADM

## 2021-09-14 RX ORDER — DIPHENHYDRAMINE HCL 25 MG
25 CAPSULE ORAL EVERY 4 HOURS PRN
Status: DISCONTINUED | OUTPATIENT
Start: 2021-09-14 | End: 2021-09-17

## 2021-09-14 RX ORDER — DIAZEPAM 5 MG/1
5 TABLET ORAL 3 TIMES DAILY
Status: DISCONTINUED | OUTPATIENT
Start: 2021-09-14 | End: 2021-09-17

## 2021-09-14 RX ORDER — ROCURONIUM BROMIDE 10 MG/ML
INJECTION, SOLUTION INTRAVENOUS AS NEEDED
Status: DISCONTINUED | OUTPATIENT
Start: 2021-09-14 | End: 2021-09-14 | Stop reason: SURG

## 2021-09-14 RX ORDER — GABAPENTIN 400 MG/1
400 CAPSULE ORAL 3 TIMES DAILY
Status: DISCONTINUED | OUTPATIENT
Start: 2021-09-14 | End: 2021-09-17

## 2021-09-14 RX ORDER — METOCLOPRAMIDE 10 MG/1
10 TABLET ORAL ONCE
Status: DISCONTINUED | OUTPATIENT
Start: 2021-09-14 | End: 2021-09-14 | Stop reason: HOSPADM

## 2021-09-14 RX ORDER — PROCHLORPERAZINE EDISYLATE 5 MG/ML
5 INJECTION INTRAMUSCULAR; INTRAVENOUS ONCE AS NEEDED
Status: DISCONTINUED | OUTPATIENT
Start: 2021-09-14 | End: 2021-09-14 | Stop reason: HOSPADM

## 2021-09-14 RX ADMIN — SODIUM CHLORIDE, SODIUM LACTATE, POTASSIUM CHLORIDE, CALCIUM CHLORIDE: 600; 310; 30; 20 INJECTION, SOLUTION INTRAVENOUS at 15:46:00

## 2021-09-14 RX ADMIN — LIDOCAINE HYDROCHLORIDE 50 MG: 10 INJECTION, SOLUTION EPIDURAL; INFILTRATION; INTRACAUDAL; PERINEURAL at 07:38:00

## 2021-09-14 RX ADMIN — CEFAZOLIN SODIUM/WATER 2 G: 2 G/20 ML SYRINGE (ML) INTRAVENOUS at 11:50:00

## 2021-09-14 RX ADMIN — ROCURONIUM BROMIDE 5 MG: 10 INJECTION, SOLUTION INTRAVENOUS at 07:38:00

## 2021-09-14 RX ADMIN — ROCURONIUM BROMIDE 5 MG: 10 INJECTION, SOLUTION INTRAVENOUS at 09:30:00

## 2021-09-14 RX ADMIN — SODIUM CHLORIDE, SODIUM LACTATE, POTASSIUM CHLORIDE, CALCIUM CHLORIDE: 600; 310; 30; 20 INJECTION, SOLUTION INTRAVENOUS at 14:50:00

## 2021-09-14 RX ADMIN — ONDANSETRON 4 MG: 2 INJECTION INTRAMUSCULAR; INTRAVENOUS at 09:51:00

## 2021-09-14 RX ADMIN — SODIUM CHLORIDE: 9 INJECTION, SOLUTION INTRAVENOUS at 07:45:00

## 2021-09-14 RX ADMIN — MIDAZOLAM HYDROCHLORIDE 2 MG: 1 INJECTION INTRAMUSCULAR; INTRAVENOUS at 07:33:00

## 2021-09-14 RX ADMIN — SODIUM CHLORIDE: 9 INJECTION, SOLUTION INTRAVENOUS at 14:50:00

## 2021-09-14 RX ADMIN — DEXAMETHASONE SODIUM PHOSPHATE 4 MG: 4 MG/ML VIAL (ML) INJECTION at 09:51:00

## 2021-09-14 RX ADMIN — CEFAZOLIN SODIUM/WATER 2 G: 2 G/20 ML SYRINGE (ML) INTRAVENOUS at 07:50:00

## 2021-09-14 NOTE — ANESTHESIA POSTPROCEDURE EVALUATION
Patient: Nonda Rice    Procedure Summary     Date: 09/14/21 Room / Location: [address] [place] [address] MAIN OR 10 / [address] [place] [address] MAIN OR    Anesthesia Start: 0730 Anesthesia Stop: 6365    Procedures:       L4-5 extreme lateral interbody fusion with metallic cage and cadaver marli

## 2021-09-14 NOTE — PROGRESS NOTES
[facility]    Progress Note    Ivet Jennings Patient Status:  Inpatient    1945 MRN [id number]   Location [address] UN[address] Attending Hilaria Zavala MD   Hosp Day # 0 PCP Sage Sawant MD     [facility] 295.0 08/25/2021    CREATSERUM 0.96 08/25/2021    BUN 18 08/25/2021     (L) 08/25/2021    K 4.3 08/25/2021     08/25/2021    CO2 30.0 08/25/2021     (H) 08/25/2021    CA 9.0 08/25/2021    ALB 3.5 07/02/2021    ALKPHO 83 06/26/2020    CARY

## 2021-09-14 NOTE — ANESTHESIA PREPROCEDURE EVALUATION
Anesthesia PreOp Note    HPI:     Tyrese Albright is a 77year old female who presents for preoperative consultation requested by: Rebecca Nolan MD    Date of Surgery: 9/14/2021    Procedure(s):  L4-5 extreme lateral interbody fusion with metallic 05/12/2020      Cataract         Date Noted: 08/05/2016      Seropositive rheumatoid arthritis of multiple sites [facility])         Date Noted: 12/16/2015        Past Medical History:   Diagnosis Date   • Anxiety state    • Back problem    • Esophageal reflux Bag at 09/14/21 0615  acetaminophen (TYLENOL EXTRA STRENGTH) tab 1,000 mg, 1,000 mg, Oral, Once, Maddy Constantino MD  famotidine (PEPCID) tab 20 mg, 20 mg, Oral, Once, Maddy Constantino MD  metoclopramide (REGLAN) tab 10 mg, 10 mg, Oral, Once, Leticia Gilman Insecurity:       Worried About Running Out of Food in the Last Year: Not on file      Ran Out of Food in the Last Year: Not on file  Transportation Needs:       Lack of Transportation (Medical): Not on file      Lack of Transportation (Non-Medical):  Not o 08/23/21  1041 09/14/21  0552   BP:  159/76   Pulse:  74   Resp:  18   Temp:  98.5 °F (36.9 °C)   TempSrc:  Oral   SpO2:  96%   Weight: 82.6 kg (182 lb) 81.7 kg (180 lb 3.2 oz)   Height: 1.651 m (5' 5\") 1.651 m (5' 5\")        Anesthesia Evaluation     Pa

## 2021-09-14 NOTE — INTERVAL H&P NOTE
Anahy Nascimento was again informed of the nature of the problem, planned treatment, indications and alternatives.   We again reviewed the expected benefits of surgery, as well as all reason risks related to not receiving this procedure. We will proceed with procedure as planned.

## 2021-09-14 NOTE — BRIEF OP NOTE
Pre-Operative Diagnosis: other spondylosis with radiculopathy lumbar region, intervertebral disc disorders with radiculopathy lumbar region     Post-Operative Diagnosis: other spondylosis with radiculopathy lumbar region, intervertebral disc disorders with

## 2021-09-14 NOTE — ANESTHESIA PROCEDURE NOTES
Airway  Date/Time: 9/14/2021 7:41 AM  Urgency: Elective      General Information and Staff    Patient location during procedure: OR  Anesthesiologist: Warren Prieto MD  Resident/CRNA: Socorro Pérez CRNA  Performed: CRNA     Indications and Patient

## 2021-09-14 NOTE — OPERATIVE REPORT
OPERATIVE REPORT      PATIENT NAME:  Michael Gomez DATE OF OPERATION:  09/14/2021      PREOPERATIVE DIAGNOSES:  1. Lumbar spondylosis with radiculopathy. 2.  Spondylolisthesis, L4-5.  3.  Herniated L4-5 disc with radiculopathy.   4.  Lumbar spina skin.  The posterior lumbar region and lateral lumbar region were then prepped with alcohol followed by chlorhexidine gluconate solution and draped with sterile linens and clear plastic drapes. The C-arm was also draped sterilely into the field.   A time o region had greater than 13 milliamps with stimulation after the posterior tissues were dissected behind the posterior blade. Stimulating behind the retractor did find discharges that were even less than 10 milliamps.   The annulotomy blade was used to open compression. The lumbosacral region was then prepped with alcohol, followed by chlorhexidine gluconate solution and draped with sterile linen and a clear plastic drape. A lateral lumbosacral spine x-ray was taken to localize the L4 level.   A skin incisio and lamina were lifted away from the spinal canal, decompressing the central canal.  The inferior half of the L3 lamina was also removed and the lateral recesses were decompressed at L3-4, L4-5 and L5-S1 bilaterally with Kerrison rongeurs.   Foraminotomies no evidence for CSF leak was seen. More irrigation was used and hemostasis was confirmed.   The neural elements were again inspected and the thecal sac was inspected circumferentially with a Newman ball probe including going out the neural foramina to veri

## 2021-09-15 LAB
HCT VFR BLD AUTO: 28 %
HGB BLD-MCNC: 9 G/DL

## 2021-09-15 PROCEDURE — 99232 SBSQ HOSP IP/OBS MODERATE 35: CPT | Performed by: HOSPITALIST

## 2021-09-15 RX ORDER — CEFAZOLIN SODIUM/WATER 2 G/20 ML
2 SYRINGE (ML) INTRAVENOUS EVERY 8 HOURS
Status: DISCONTINUED | OUTPATIENT
Start: 2021-09-15 | End: 2021-09-17

## 2021-09-15 RX ORDER — TRAMADOL HYDROCHLORIDE 50 MG/1
50 TABLET ORAL EVERY 6 HOURS PRN
Status: DISCONTINUED | OUTPATIENT
Start: 2021-09-15 | End: 2021-09-17

## 2021-09-15 NOTE — PHYSICAL THERAPY NOTE
PHYSICAL THERAPY EVALUATION - INPATIENT     Room Number: 418/418-A  Evaluation Date: 9/15/2021  Type of Evaluation: Initial   Physician Order: PT Eval and Treat    Presenting Problem: severe lumbar pain, stenosis and RLE radiculopathy  Reason for Therapy: walk 15'x2 with RW and cues for posture, step length, use of RW with mod A and second person for lines/safety. Patient is positioned in chair with all needs in reach and RN aware. Daughter at bedside.     PM: Patient presents in chair and agreeable to thera Seropositive rheumatoid arthritis of multiple sites [facility])    Preop testing      Past Medical History  Past Medical History:   Diagnosis Date   • Anxiety state    • Back problem    • Esophageal reflux    • Osteoarthritis    • Rheumatoid arthritis ([address]    • within functional limits but limited due to recent back surgery    Lower extremity ROM is within functional limits but limited at hips due to pain    Lower extremity strength is within functional limits but limited especially postural muscles    BALANCE  S improvement by end of session    Bed Mobility: mod A and cues for log roll supine to sit, and for rolling and positioning in bed    Transfers: mod A sit to stand with cues for hand placement.  Mod A for bed to/from chair with RW, cues for position in RW and

## 2021-09-15 NOTE — PROGRESS NOTES
[facility]    Progress Note    Chantel Gomez Patient Status:  Inpatient    1945 MRN [id number]   Location [facility] 4W/SW/SE Attending Will Sam MD   Hosp Day # 1 PCP Nelsy Medina MD       Subjective:     Still 08/25/2021     (L) 08/25/2021    K 4.3 08/25/2021     08/25/2021    CO2 30.0 08/25/2021     (H) 08/25/2021    CA 9.0 08/25/2021    ALB 3.5 07/02/2021    ALKPHO 83 06/26/2020    BILT 0.3 06/26/2020    TP 7.7 06/26/2020    AST 24 07/02/202

## 2021-09-15 NOTE — CM/SW NOTE
09/15/21 1300   CM/SW Referral Data   Referral Source Social Work (self-referral)   Reason for Referral Discharge planning   Informant Daughter   Pertinent Medical Hx   Does patient have an established PCP?  Yes   Patient Info   Patient's Current Mental

## 2021-09-15 NOTE — PROGRESS NOTES
Ms. Natalie Constantino was extremely drowsy from anesthesia until almost midnight, and has slept only a little here and there since that time. She has a lot of incisional low back pain, but feels that the medications are giving her adequate relief.   She said that all short stay in an acute rehabilitation facility. Ms. Natalie Vira is agreeable to this plan.

## 2021-09-15 NOTE — PLAN OF CARE
Pt is A&Ox4. Pt is on nasal cannula, on 2 L/min with EtCO2. Pt is on clear liquid diet. Pt does not have herman because it was taken out in surgery on 9/14/2021. Pt passed check void. Pt complains of pain.  Pt does not like Norco, pt states it makes her conf

## 2021-09-15 NOTE — SPIRITUAL CARE NOTE
Pt 77, fem  recovering from back surgery. Provided compassionate and active listening. Was joyful of the expectations of being DC in a few days, but dreaded PT.  She spoke highly of her two sisters and two daughters who visit her often and provide her with

## 2021-09-16 LAB
ALBUMIN SERPL-MCNC: 2.6 G/DL (ref 3.4–5)
ANION GAP SERPL CALC-SCNC: 6 MMOL/L (ref 0–18)
BUN BLD-MCNC: 7 MG/DL (ref 7–18)
BUN/CREAT SERPL: 8.8 (ref 10–20)
CALCIUM BLD-MCNC: 8.4 MG/DL (ref 8.5–10.1)
CHLORIDE SERPL-SCNC: 110 MMOL/L (ref 98–112)
CO2 SERPL-SCNC: 26 MMOL/L (ref 21–32)
CREAT BLD-MCNC: 0.8 MG/DL
DEPRECATED RDW RBC AUTO: 42.2 FL (ref 35.1–46.3)
ERYTHROCYTE [DISTWIDTH] IN BLOOD BY AUTOMATED COUNT: 15.5 % (ref 11–15)
GLUCOSE BLD-MCNC: 100 MG/DL (ref 70–99)
HCT VFR BLD AUTO: 26.5 %
HGB BLD-MCNC: 8.3 G/DL
MAGNESIUM SERPL-MCNC: 2 MG/DL (ref 1.6–2.6)
MCH RBC QN AUTO: 23.7 PG (ref 26–34)
MCHC RBC AUTO-ENTMCNC: 31.3 G/DL (ref 31–37)
MCV RBC AUTO: 75.7 FL
OSMOLALITY SERPL CALC.SUM OF ELEC: 292 MOSM/KG (ref 275–295)
PHOSPHATE SERPL-MCNC: 1.9 MG/DL (ref 2.5–4.9)
PLATELET # BLD AUTO: 269 10(3)UL (ref 150–450)
POTASSIUM SERPL-SCNC: 3.4 MMOL/L (ref 3.5–5.1)
RBC # BLD AUTO: 3.5 X10(6)UL
SODIUM SERPL-SCNC: 142 MMOL/L (ref 136–145)
WBC # BLD AUTO: 11.4 X10(3) UL (ref 4–11)

## 2021-09-16 PROCEDURE — 99233 SBSQ HOSP IP/OBS HIGH 50: CPT | Performed by: HOSPITALIST

## 2021-09-16 RX ORDER — POTASSIUM CHLORIDE 14.9 MG/ML
20 INJECTION INTRAVENOUS ONCE
Status: COMPLETED | OUTPATIENT
Start: 2021-09-16 | End: 2021-09-17

## 2021-09-16 NOTE — PROGRESS NOTES
[facility]    Neurosurgery Progress Note    Gardiner Kin Patient Status:  Inpatient    1945 MRN [id number]   Location [facility] 4W/SW/SE Attending Cherry Osorio MD   Hosp Day # 2 PCP MD Bren Lord HCl (DILAUDID) 1 MG/ML injection 0.2 mg, 0.2 mg, Intravenous, Q2H PRN **OR** HYDROmorphone HCl (DILAUDID) 1 MG/ML injection 0.4 mg, 0.4 mg, Intravenous, Q2H PRN **OR** HYDROmorphone HCl (DILAUDID) 1 MG/ML injection 0.8 mg, 0.8 mg, Intravenous, Q2H PRN  •

## 2021-09-16 NOTE — PHYSICAL THERAPY NOTE
PHYSICAL THERAPY TREATMENT NOTE - INPATIENT     Room Number: 351/226-Z       Presenting Problem: severe lumbar pain, stenosis and RLE radiculopathy    Problem List  Principal Problem:    L4-5 severe, L3-4 moderate central stenosis  Active Problems:    Sero MOBILITY  How much difficulty does the patient currently have. ..  -   Turning over in bed (including adjusting bedclothes, sheets and blankets)?: A Little   -   Sitting down on and standing up from a chair with arms (e.g., wheelchair, bedside commode, etc. level: supervision   Goal #3   Current Status Pt amb 2 x 50 ft with RW and Min a   Goal #4 Patient will negotiate one curb w/ assistive device and minimal assistance   Goal #4   Current Status NT   Goal #5 Patient to demonstrate independence with home acti

## 2021-09-16 NOTE — PLAN OF CARE
Patient alert and oriented x 4, now on room air, up with 1 assist and a walker, voiding. Nauseated today-Zofran given. Continuous fluids due to lack of appetite and intake. Surgical site is c/d/I. Hemovac is in place to bulb suction.  Plan is to go home wit evaluate response  - Consider cultural and social influences on pain and pain management  - Manage/alleviate anxiety  - Utilize distraction and/or relaxation techniques  - Monitor for opioid side effects  - Notify MD/LIP if interventions unsuccessful or pa discharge planning if the patient needs post-hospital services based on physician/LIP order or complex needs related to functional status, cognitive ability or social support system  Outcome: Progressing

## 2021-09-16 NOTE — SPIRITUAL CARE NOTE
Pt up sitting in chair, overlooking the outside grounds. She was very happy with her progress, but was drowsy due to medication given for pre PT. She talked about her plans after getting released from the hospital; traveling to [facility].  She nodde

## 2021-09-16 NOTE — PROGRESS NOTES
[facility]    Progress Note    Nonda Rice Patient Status:  Inpatient    1945 MRN [id number]   Location [facility] 4W/SW/SE Attending Garrett Mock MD   Hosp Day # 2 PCP Cristobal Baptiste MD       Subjective:     Much rehab), possibly tomorrow    Chart reviewed  D/w pt, daughter at bedside and RN, OT  Greater than 35 minutes spent, >50% spent counseling and coordinating of care as outlined above.     Results:     Lab Results   Component Value Date    WBC 11.4 (H) 09/16/2

## 2021-09-16 NOTE — OCCUPATIONAL THERAPY NOTE
OCCUPATIONAL THERAPY EVALUATION - INPATIENT      Room Number: 418/418-A  Evaluation Date: 9/16/2021  Type of Evaluation: Initial       Physician Order: IP Consult to Occupational Therapy  Reason for Therapy: ADL/IADL Dysfunction and Discharge Planning    O the physical skills required to return home with home health therapy for home safety evaluation and assist from family --dtr planning to stay with patient initially.         DISCHARGE RECOMMENDATIONS  OT Discharge Recommendations: Home with home health PT/O home independently and without a device prior to the last month.  She drives, cooks, cleans    SUBJECTIVE  Agreeable to activity     OCCUPATIONAL THERAPY EXAMINATION     OBJECTIVE  Precautions: Spine  Fall Risk: High fall risk    WEIGHT BEARING RESTRICTION sink level with Mod I   Comment:    Patient will independently recall spine precautions  Comment:         Goals  on: 2021  Frequency: 1-2 additional sessions

## 2021-09-16 NOTE — HOME CARE LIAISON
Received referral via [employer] for Home Health services. Spoke w/ patient's dtr/Bret and provided with list of Bret Orosco providers from [address] [place] [address], dtr choice is [facility] [address] [facility].  Agency reserved in [address] [place] [address] and contact information placed on [employer]Financial intere

## 2021-09-16 NOTE — PLAN OF CARE
POD#2. A&O x4. Incision to lower back & right flank with dermadond. Some pain with activity but pt declined pain meds during the night. CMS  intact. OOB with assist using walker. Fall precautions in place. Call light in reach & bed alarm on.  Hemovac with s appropriate and evaluate response  - Consider cultural and social influences on pain and pain management  - Manage/alleviate anxiety  - Utilize distraction and/or relaxation techniques  - Monitor for opioid side effects  - Notify MD/LIP if interventions un for coordinating discharge planning if the patient needs post-hospital services based on physician/LIP order or complex needs related to functional status, cognitive ability or social support system  Outcome: Progressing

## 2021-09-17 VITALS
WEIGHT: 180.19 LBS | BODY MASS INDEX: 30.02 KG/M2 | OXYGEN SATURATION: 90 % | RESPIRATION RATE: 18 BRPM | TEMPERATURE: 100 F | HEIGHT: 65 IN | DIASTOLIC BLOOD PRESSURE: 70 MMHG | SYSTOLIC BLOOD PRESSURE: 127 MMHG | HEART RATE: 108 BPM

## 2021-09-17 LAB
DEPRECATED RDW RBC AUTO: 42.2 FL (ref 35.1–46.3)
ERYTHROCYTE [DISTWIDTH] IN BLOOD BY AUTOMATED COUNT: 15.4 % (ref 11–15)
HCT VFR BLD AUTO: 26.6 %
HGB BLD-MCNC: 8.4 G/DL
MCH RBC QN AUTO: 23.7 PG (ref 26–34)
MCHC RBC AUTO-ENTMCNC: 31.6 G/DL (ref 31–37)
MCV RBC AUTO: 75.1 FL
PHOSPHATE SERPL-MCNC: 1.8 MG/DL (ref 2.5–4.9)
PLATELET # BLD AUTO: 304 10(3)UL (ref 150–450)
POTASSIUM SERPL-SCNC: 3.4 MMOL/L (ref 3.5–5.1)
RBC # BLD AUTO: 3.54 X10(6)UL
WBC # BLD AUTO: 10.8 X10(3) UL (ref 4–11)

## 2021-09-17 PROCEDURE — 99232 SBSQ HOSP IP/OBS MODERATE 35: CPT | Performed by: HOSPITALIST

## 2021-09-17 RX ORDER — PSEUDOEPHEDRINE HCL 30 MG
100 TABLET ORAL 2 TIMES DAILY PRN
Refills: 0 | Status: SHIPPED | COMMUNITY
Start: 2021-09-17 | End: 2021-10-04

## 2021-09-17 RX ORDER — NAPROXEN SODIUM 220 MG
1-2 TABLET ORAL 2 TIMES DAILY
Refills: 0 | Status: SHIPPED | COMMUNITY
Start: 2021-09-17 | End: 2021-10-05 | Stop reason: ALTCHOICE

## 2021-09-17 RX ORDER — LORAZEPAM 1 MG/1
1 TABLET ORAL
Status: SHIPPED | COMMUNITY
Start: 2021-09-17 | End: 2021-10-04

## 2021-09-17 NOTE — PHYSICAL THERAPY NOTE
PHYSICAL THERAPY TREATMENT NOTE - INPATIENT     Room Number: 317/934-Y       Presenting Problem: severe lumbar pain, stenosis and RLE radiculopathy    Problem List  Principal Problem:    L4-5 severe, L3-4 moderate central stenosis  Active Problems:    Sero TOLERANCE                         O2 WALK       AM-PAC '6-Clicks' INPATIENT SHORT FORM - BASIC MOBILITY  How much difficulty does the patient currently have. ..  -   Turning over in bed (including adjusting bedclothes, sheets and blankets)?: A Little   - Patient will negotiate one curb w/ assistive device and minimal assistance   Goal #4   Current Status Navigated 4 stairs with CGA   Goal #5 Patient to demonstrate independence with home activity/exercise instructions provided to patient in preparation for

## 2021-09-17 NOTE — CM/SW NOTE
09/17/21 1100   Discharge disposition   Expected discharge disposition Home-Health   Post Acute Care Provider [facility]   Discharge transportation Private car     Pt discussed during nursing rounds.  Per RN Hodan Allen pt inquired about at [facility] a

## 2021-09-17 NOTE — PLAN OF CARE
Patient alert and oriented x 4, on room air, up with 1 and a walker, Hemovac in place, voiding, pain being managed with prn tylenol and tramadol. K+ replaced today per protocol/after speaking w/Dr. Lesli Wharton. Surgical sites c/d/I, ice therapy applied.  Plan social influences on pain and pain management  - Manage/alleviate anxiety  - Utilize distraction and/or relaxation techniques  - Monitor for opioid side effects  - Notify MD/LIP if interventions unsuccessful or patient reports new pain  - Anticipate increa post-hospital services based on physician/LIP order or complex needs related to functional status, cognitive ability or social support system  Outcome: Progressing

## 2021-09-17 NOTE — PLAN OF CARE
Problem: Patient Centered Care  Goal: Patient preferences are identified and integrated in the patient's plan of care  Description: Interventions:  - What would you like us to know as we care for you? Both daughters are involved with pt's healthcare.   - appropriate  Outcome: Adequate for Discharge     Problem: RISK FOR INFECTION - ADULT  Goal: Absence of fever/infection during anticipated neutropenic period  Description: INTERVENTIONS  - Monitor WBC  - Administer growth factors as ordered  - Implement rubens

## 2021-09-17 NOTE — PROGRESS NOTES
Patient doing well  AFVSS  +UOP  Drain 40cc  Neuro intact  Incisions c/d/i    A/P: S/p lumbar decompression and fusion, doing well.   Drain out  [employer] today  F/u with Dr. Nickie Sahu

## 2021-09-17 NOTE — PLAN OF CARE
Problem: Patient Centered Care  Goal: Patient preferences are identified and integrated in the patient's plan of care  Description: Interventions:  - What would you like us to know as we care for you? Both daughters are involved with pt's healthcare.   - RISK FOR INFECTION - ADULT  Goal: Absence of fever/infection during anticipated neutropenic period  Description: INTERVENTIONS  - Monitor WBC  - Administer growth factors as ordered  - Implement neutropenic guidelines  Outcome: Progressing     Problem: SAF Slowed infusion rate but still did not help. Infusion stopped. Pt alert and oriented. Surgical dressing CDI. Patient ambulates with one assist and the walker. Hemovac drain in place. Tolerating diet. Patient voids freely.  Pain controlled with PRN trama

## 2021-09-20 ENCOUNTER — PATIENT OUTREACH (OUTPATIENT)
Dept: CASE MANAGEMENT | Age: 76
End: 2021-09-20

## 2021-09-20 ENCOUNTER — TELEPHONE (OUTPATIENT)
Dept: INTERNAL MEDICINE CLINIC | Facility: CLINIC | Age: 76
End: 2021-09-20

## 2021-09-20 ENCOUNTER — TELEPHONE (OUTPATIENT)
Dept: RHEUMATOLOGY | Facility: CLINIC | Age: 76
End: 2021-09-20

## 2021-09-20 DIAGNOSIS — M54.59 INTRACTABLE LOW BACK PAIN: ICD-10-CM

## 2021-09-20 DIAGNOSIS — Z02.9 ENCOUNTERS FOR ADMINISTRATIVE PURPOSE: ICD-10-CM

## 2021-09-20 PROCEDURE — 1111F DSCHRG MED/CURRENT MED MERGE: CPT

## 2021-09-20 RX ORDER — TRAMADOL HYDROCHLORIDE 50 MG/1
50 TABLET ORAL EVERY 6 HOURS PRN
COMMUNITY
End: 2022-01-03 | Stop reason: ALTCHOICE

## 2021-09-20 NOTE — PROGRESS NOTES
Initial Post Discharge Follow Up   Discharge Date: 9/17/21  Contact Date: 9/20/2021    Consent Verification:  Assessment Completed With: Patient  HIPAA Verified?   Yes    Discharge Dx:   L4-5 severe, L3-4 moderate central stenosis      Was TCC ordered: n mg by mouth every 6 (six) hours as needed for Pain. • gabapentin 400 MG Oral Cap Take 1 capsule (400 mg total) by mouth in the morning, at noon, and at bedtime. 90 capsule 0   • PEG 3350 17 g Oral Powd Pack Take 17 g by mouth daily as needed.  30 each 0 1- Very Poor/unclear and 5- Very well/clear  o Very well  o Comments:  • Were you satisfied with the discharge process?   yes  (For SPINE PATIENTS only)      • Did your surgeon order a brace or cervical collar for you after surgery? no           Needs pos patient,  and orders reviewed and discussed. Any changes or updates to medications and or orders sent to PCP.

## 2021-09-20 NOTE — TELEPHONE ENCOUNTER
SIOBHAN, Spoke to pt for TCM today. Pt does not have HFU appt scheduled at this time. She states that she will have her daughter schedule appt later today via [employer]. TCM/HFU appt recommended by 10/1/2021 as pt is a moderate risk for readmission.       BOOK

## 2021-09-20 NOTE — TELEPHONE ENCOUNTER
Patient called with daughter to inform us she was recently hospitalized and had back surgery. She did not receive Enbrel while in the hospital. She was discharged on Friday (9/17/2021) Her last dose was 1 1/2 - 2 weeks ago.  She would like to know if its ok

## 2021-09-20 NOTE — TELEPHONE ENCOUNTER
Please let her know that as long as her wound is doing well, she can resume her Enbrel injections next Monday, September 27th. Thank you.

## 2021-09-21 NOTE — TELEPHONE ENCOUNTER
Please assist patient in getting scheduled for TCM upon Dr. Nik Park return from vacation prior to 10/1/21.

## 2021-09-29 NOTE — DISCHARGE SUMMARY
[facility] HOSPITALIST  DISCHARGE SUMMARY     Sarah Decker Patient Status:  Inpatient    1945 MRN [id number]   Location [facility] 4W/SW/SE Attending No att. providers found   Hosp Day # 3 PCP Kathy Coleman MD     Date of Admission:  mg by mouth 2 (two) times daily as needed for constipation.    Refills: 0        CHANGE how you take these medications      Instructions Prescription details   naproxen 220 MG Tabs  What changed: additional instructions      Take 1-2 tablets (220-440 mg tot rhythm. No murmurs, rubs or gallops. Abdomen: Soft, nontender, nondistended. Positive bowel sounds. No rebound or guarding. Neurologic: No new focal neurological deficits. Musculoskeletal: Moves all extremities. Extremities: No edema.   -------------

## 2021-10-04 ENCOUNTER — OFFICE VISIT (OUTPATIENT)
Dept: INTERNAL MEDICINE CLINIC | Facility: CLINIC | Age: 76
End: 2021-10-04
Payer: MEDICARE

## 2021-10-04 VITALS
WEIGHT: 173 LBS | OXYGEN SATURATION: 97 % | BODY MASS INDEX: 29 KG/M2 | TEMPERATURE: 97 F | SYSTOLIC BLOOD PRESSURE: 138 MMHG | HEART RATE: 71 BPM | DIASTOLIC BLOOD PRESSURE: 72 MMHG

## 2021-10-04 DIAGNOSIS — E83.39 HYPOPHOSPHATEMIA: ICD-10-CM

## 2021-10-04 DIAGNOSIS — M54.16 LUMBAR RADICULOPATHY: Primary | ICD-10-CM

## 2021-10-04 DIAGNOSIS — F41.9 ANXIETY: ICD-10-CM

## 2021-10-04 DIAGNOSIS — Z12.11 COLON CANCER SCREENING: ICD-10-CM

## 2021-10-04 DIAGNOSIS — M05.79 SEROPOSITIVE RHEUMATOID ARTHRITIS OF MULTIPLE SITES (HCC): ICD-10-CM

## 2021-10-04 DIAGNOSIS — E87.6 HYPOKALEMIA: ICD-10-CM

## 2021-10-04 DIAGNOSIS — D50.9 MICROCYTIC ANEMIA: ICD-10-CM

## 2021-10-04 DIAGNOSIS — Z23 NEEDS FLU SHOT: ICD-10-CM

## 2021-10-04 PROCEDURE — 90662 IIV NO PRSV INCREASED AG IM: CPT | Performed by: INTERNAL MEDICINE

## 2021-10-04 PROCEDURE — 1111F DSCHRG MED/CURRENT MED MERGE: CPT | Performed by: INTERNAL MEDICINE

## 2021-10-04 PROCEDURE — 80053 COMPREHEN METABOLIC PANEL: CPT | Performed by: INTERNAL MEDICINE

## 2021-10-04 PROCEDURE — 85025 COMPLETE CBC W/AUTO DIFF WBC: CPT | Performed by: INTERNAL MEDICINE

## 2021-10-04 PROCEDURE — 99495 TRANSJ CARE MGMT MOD F2F 14D: CPT | Performed by: INTERNAL MEDICINE

## 2021-10-04 PROCEDURE — G0008 ADMIN INFLUENZA VIRUS VAC: HCPCS | Performed by: INTERNAL MEDICINE

## 2021-10-04 PROCEDURE — 84100 ASSAY OF PHOSPHORUS: CPT | Performed by: INTERNAL MEDICINE

## 2021-10-04 RX ORDER — LORAZEPAM 1 MG/1
1 TABLET ORAL
Qty: 30 TABLET | Refills: 0 | Status: SHIPPED | OUTPATIENT
Start: 2021-10-04 | End: 2022-01-03

## 2021-10-04 RX ORDER — TIZANIDINE 4 MG/1
TABLET ORAL
COMMUNITY
Start: 2021-10-01 | End: 2022-01-03 | Stop reason: ALTCHOICE

## 2021-10-04 NOTE — PROGRESS NOTES
HPI:    Ji Ji is a 77year old female here today for hospital follow up.    She was discharged from Inpatient hospital[facility]  to [address] [place] [address] Date: 9/14/21  Discharge Date: 9/17/21      Hospital Discharge Diagnosis:       -Spondy laminectomy, foraminotomy, L4-L5 discectomy and decompression of spinal nerves with metallic Z5-G2 pedicle screws through the back. She had  postop anemia with hemoglobin of 8.4,  hypokalemia and hypophosphatemia.  Stated appetitewas poor due to sever ba exertion or palpitations  GI: denies abdominal pain, denies heartburn, denies diarrhea  MUSCULOSKELETAL:minimal back pain   NEURO: denies headaches, denies dizziness, denies weakness  PSYCHE:  anxiety  HEMATOLOGIC: Post op anemia, denies bleeding  ENDOCRIN PLATELET; Future    Hypophosphatemia  -     PHOSPHORUS; Future    Hypokalemia  -     COMP METABOLIC PANEL (14);  Future    Colon cancer screening  -     GASTRO - INTERNAL    Anxiety  Refill Lorazepam  Daughter with her most of the time.  -      NAVIGATOR

## 2021-10-19 ENCOUNTER — OFFICE VISIT (OUTPATIENT)
Dept: PODIATRY CLINIC | Facility: CLINIC | Age: 76
End: 2021-10-19
Payer: MEDICARE

## 2021-10-19 DIAGNOSIS — M21.6X1 ACQUIRED EQUINUS DEFORMITY OF RIGHT FOOT: ICD-10-CM

## 2021-10-19 DIAGNOSIS — M79.671 RIGHT FOOT PAIN: ICD-10-CM

## 2021-10-19 DIAGNOSIS — G57.61 PLANTAR NEUROMA OF RIGHT FOOT: Primary | ICD-10-CM

## 2021-10-19 PROCEDURE — 99203 OFFICE O/P NEW LOW 30 MIN: CPT | Performed by: PODIATRIST

## 2021-10-19 NOTE — PROGRESS NOTES
[facility] Podiatry  Progress Note    Can Gresham is a 77year old female. Patient presents with:   Foot Pain: New pt c/o bilat foot ball pain when walking, started January   Ingrown Toenail: Pt c/o bilat hallux ingrown nail        HPI:     Th Other (Other) Father         stroke   • Other (Other) Mother         stroke      Social History    Socioeconomic History      Marital status:     Tobacco Use      Smoking status: Never Smoker      Smokeless tobacco: Never Used    Vaping Use      Va Frankie [phone] 10/04/2021        Lab Results   Component Value Date     09/16/2020    A1C 5.9 (H) 09/16/2020        No results found.      ASSESSMENT AND PLAN:   Diagnoses and all orders for this visit:    Plantar neuroma of right foot  -     PHYSICAL

## 2021-10-20 ENCOUNTER — ORDER TRANSCRIPTION (OUTPATIENT)
Dept: PHYSICAL THERAPY | Facility: HOSPITAL | Age: 76
End: 2021-10-20

## 2021-10-20 DIAGNOSIS — M51.16 INTERVERTEBRAL DISC DISORDERS WITH RADICULOPATHY, LUMBAR REGION: Primary | ICD-10-CM

## 2021-10-22 ENCOUNTER — TELEPHONE (OUTPATIENT)
Dept: PHYSICAL THERAPY | Facility: HOSPITAL | Age: 76
End: 2021-10-22

## 2021-10-26 ENCOUNTER — OFFICE VISIT (OUTPATIENT)
Dept: PHYSICAL THERAPY | Facility: HOSPITAL | Age: 76
End: 2021-10-26
Attending: NEUROLOGICAL SURGERY
Payer: MEDICARE

## 2021-10-26 DIAGNOSIS — M51.16 INTERVERTEBRAL DISC DISORDERS WITH RADICULOPATHY, LUMBAR REGION: ICD-10-CM

## 2021-10-26 PROCEDURE — 97162 PT EVAL MOD COMPLEX 30 MIN: CPT

## 2021-10-26 NOTE — PROGRESS NOTES
SPINE EVALUATION:   Referring Physician: Dr. Nikki Chen  Diagnosis: Intervertebral disc disorders with radiculopathy, lumbar region (M51.16)       Date of Service: 10/26/2021     PATIENT SUMMARY   Brock Alcantar is a 77year old female who presents to before she needs a break. Toes are numb on the R side of foot. She does have tingling in the legs when she wakes up. She was prescribed medication for the n/t which is helping. Not really getting any real pain, just some discomfort.  R side of low back is h Therapy is medically necessary to address the above impairments and reach functional goals.      Precautions:  Lumbar fusion post-op precautions  OBJECTIVE:   Observation/Posture: Norm lumbar lordosis  Neuro Screen: neg babinski B, neg clonus B, slightly de twisting. Instructed pt that she can perform a squat down to pick something light off the floor so that she doesn't have to use her reacher. Discussed proper mechanics for squatting so that she performs hip hinge and not much lumbar flexion.  No HEP was giv options and has agreed to actively participate in planning and for this course of care. Thank you for your referral. Please co-sign or sign and return this letter via fax as soon as possible to [phone].  If you have any questions, please contact me

## 2021-10-27 ENCOUNTER — OFFICE VISIT (OUTPATIENT)
Dept: PHYSICAL THERAPY | Facility: HOSPITAL | Age: 76
End: 2021-10-27
Attending: NEUROLOGICAL SURGERY
Payer: MEDICARE

## 2021-10-27 PROCEDURE — 97110 THERAPEUTIC EXERCISES: CPT

## 2021-10-27 PROCEDURE — 97140 MANUAL THERAPY 1/> REGIONS: CPT

## 2021-10-27 NOTE — PROGRESS NOTES
Dx: Intervertebral disc disorders with radiculopathy, lumbar region (M51.16)           Insurance (Authorized # of Visits):  Mdcr (10 per POC)            Authorizing Physician: Dr. Neeru Rolon  Next MD visit: 11/5  Fall Risk: standard         Precautions: Lumbar pain noted on the R side of the hip with PPT, however able to perform when cued to decrease motion. Pt had improved pain on the R after manual treatment.  Pt able to perform other exercises well with min increased pain overall, however pain was better after

## 2021-11-01 ENCOUNTER — HOSPITAL ENCOUNTER (OUTPATIENT)
Dept: GENERAL RADIOLOGY | Facility: HOSPITAL | Age: 76
Discharge: HOME | End: 2021-11-01
Attending: PHYSICIAN ASSISTANT
Payer: MEDICARE

## 2021-11-01 DIAGNOSIS — M51.37 DEGENERATION OF INTERVERTEBRAL DISC OF LUMBOSACRAL REGION: ICD-10-CM

## 2021-11-01 PROCEDURE — 72100 X-RAY EXAM L-S SPINE 2/3 VWS: CPT | Performed by: PHYSICIAN ASSISTANT

## 2021-11-02 ENCOUNTER — OFFICE VISIT (OUTPATIENT)
Dept: PHYSICAL THERAPY | Facility: HOSPITAL | Age: 76
End: 2021-11-02
Attending: NEUROLOGICAL SURGERY
Payer: MEDICARE

## 2021-11-02 DIAGNOSIS — M51.16 INTERVERTEBRAL DISC DISORDERS WITH RADICULOPATHY, LUMBAR REGION: ICD-10-CM

## 2021-11-02 PROCEDURE — 97112 NEUROMUSCULAR REEDUCATION: CPT

## 2021-11-02 PROCEDURE — 97110 THERAPEUTIC EXERCISES: CPT

## 2021-11-02 PROCEDURE — 97140 MANUAL THERAPY 1/> REGIONS: CPT

## 2021-11-02 NOTE — PROGRESS NOTES
Dx: Intervertebral disc disorders with radiculopathy, lumbar region (M51.16)           Insurance (Authorized # of Visits):  Mdcr (10 per POC)            Authorizing Physician: Dr. Fry Conception  Next MD visit: 11/5  Fall Risk: standard         Precautions: Lumbar difficulty with core stabilization and required cues to not hold breath. Improved with practice and cuing. Pt had some discomfort in posterior hips with SKTC. Pt able to perform other exercises well. Mod fatigue with STS.        Goals:   Pt will demonstrate

## 2021-11-04 ENCOUNTER — OFFICE VISIT (OUTPATIENT)
Dept: PHYSICAL THERAPY | Facility: HOSPITAL | Age: 76
End: 2021-11-04
Attending: NEUROLOGICAL SURGERY
Payer: MEDICARE

## 2021-11-04 PROCEDURE — 97110 THERAPEUTIC EXERCISES: CPT

## 2021-11-04 PROCEDURE — 97140 MANUAL THERAPY 1/> REGIONS: CPT

## 2021-11-04 PROCEDURE — 97112 NEUROMUSCULAR REEDUCATION: CPT

## 2021-11-04 NOTE — PROGRESS NOTES
Dx: Intervertebral disc disorders with radiculopathy, lumbar region (M51.16)           Insurance (Authorized # of Visits):  Mdcr (10 per POC)            Authorizing Physician: Dr. Brina Morse MD visit: 11/5  Fall Risk: standard         Precautions: Lumbar tightness in posterior hip musculature on the R. Muscle guarding improved after manual treatment. Pt able to perform supine exercises well with mod fatigue.  Improved core stabilization, however continues to have difficulty with breathing normally during th without hands from slightly elevated mat table - 2x10     Neuro Nichole:  TA brace hooklying - 10x5\"  TA brace with PPT hooklying - 10x5\" Neuro Nichole:  TA brace hooklying - 10x5\"  TA brace with PPT hooklying - 10x5\"  TA brace with marches hooklying - 15 B

## 2021-11-09 ENCOUNTER — OFFICE VISIT (OUTPATIENT)
Dept: PHYSICAL THERAPY | Facility: HOSPITAL | Age: 76
End: 2021-11-09
Attending: NEUROLOGICAL SURGERY
Payer: MEDICARE

## 2021-11-09 PROCEDURE — 97110 THERAPEUTIC EXERCISES: CPT

## 2021-11-09 PROCEDURE — 97140 MANUAL THERAPY 1/> REGIONS: CPT

## 2021-11-09 PROCEDURE — 97112 NEUROMUSCULAR REEDUCATION: CPT

## 2021-11-09 NOTE — PROGRESS NOTES
Dx: Intervertebral disc disorders with radiculopathy, lumbar region (M51.16)           Insurance (Authorized # of Visits):  Mdcr (10 per POC)            Authorizing Physician: Dr. Laurence Morse MD visit: 11/5  Fall Risk: standard         Precautions: Lumbar speed.        Assessment: Pt demonstrates good activation of gluts during exercises. Much fatigue in the LE's as well as fatigue overall due to decreased endurance. Required rest breaks due to fatigue. Pt had min c/o pain in the R hip with exercises.  Some B  Nustep, level 3 - 5 mins  STS with hands on thighs - 10 TherEx:  Nustep, level 3 - 5 mins  Piriformis and figure 4 stretches - 3x30\" ea B   STS with hands on thighs, 2nd set without hands from slightly elevated mat table - 2x10 TherEx:  Nustep, level 3

## 2021-11-11 ENCOUNTER — OFFICE VISIT (OUTPATIENT)
Dept: PHYSICAL THERAPY | Facility: HOSPITAL | Age: 76
End: 2021-11-11
Attending: NEUROLOGICAL SURGERY
Payer: MEDICARE

## 2021-11-11 PROCEDURE — 97140 MANUAL THERAPY 1/> REGIONS: CPT

## 2021-11-11 PROCEDURE — 97110 THERAPEUTIC EXERCISES: CPT

## 2021-11-11 PROCEDURE — 97112 NEUROMUSCULAR REEDUCATION: CPT

## 2021-11-11 NOTE — PROGRESS NOTES
Dx: Intervertebral disc disorders with radiculopathy, lumbar region (M51.16)           Insurance (Authorized # of Visits):  Mdcr (10 per POC)            Authorizing Physician: Dr. Nickolas Morse MD visit: 11/5  Fall Risk: standard         Precautions: Lumbar speed.        Assessment: Pt demonstrates good progress with LE strength. Still much difficulty with STS without use of UE's and requires min elevated mat table to perform. However, very fatigued by end of set of 10.  Mod fatigue with other exercises as wel 2nd set without hands from slightly elevated mat table - 2x10 TherEx:  Nustep, level 3 - 5 mins  Standing hip abd and ext with RTB around ankles - 10 ea B  STS with hands on thighs, 2nd set without hands from slightly elevated mat table - 10  Lat amb with

## 2021-11-16 ENCOUNTER — OFFICE VISIT (OUTPATIENT)
Dept: PHYSICAL THERAPY | Facility: HOSPITAL | Age: 76
End: 2021-11-16
Attending: NEUROLOGICAL SURGERY
Payer: MEDICARE

## 2021-11-16 PROCEDURE — 97112 NEUROMUSCULAR REEDUCATION: CPT

## 2021-11-16 PROCEDURE — 97140 MANUAL THERAPY 1/> REGIONS: CPT

## 2021-11-16 PROCEDURE — 97110 THERAPEUTIC EXERCISES: CPT

## 2021-11-16 NOTE — PROGRESS NOTES
Dx: Intervertebral disc disorders with radiculopathy, lumbar region (M51.16)           Insurance (Authorized # of Visits):  Mdcr (10 per POC)            Authorizing Physician: Dr. Lesa Morse MD visit: 11/5  Fall Risk: standard         Precautions: Lumbar in the R posterolateral hip with manual treatment. Pt continues to have difficulty with STS and required 3 pillows in chair to allow her to perform exercise well without too much difficulty and still have proper form.  Pt able to perform other exercises wel table - 2x10 TherEx:  Nustep, level 3 - 5 mins  Standing hip abd and ext with RTB around ankles - 10 ea B  STS with hands on thighs, 2nd set without hands from slightly elevated mat table - 10  Lat amb with RTB around ankles - 2x10 ft  Leg press on shuttle

## 2021-11-18 ENCOUNTER — OFFICE VISIT (OUTPATIENT)
Dept: PHYSICAL THERAPY | Facility: HOSPITAL | Age: 76
End: 2021-11-18
Attending: NEUROLOGICAL SURGERY
Payer: MEDICARE

## 2021-11-18 PROCEDURE — 97110 THERAPEUTIC EXERCISES: CPT

## 2021-11-18 PROCEDURE — 97140 MANUAL THERAPY 1/> REGIONS: CPT

## 2021-11-18 PROCEDURE — 97112 NEUROMUSCULAR REEDUCATION: CPT

## 2021-11-18 NOTE — PROGRESS NOTES
Dx: Intervertebral disc disorders with radiculopathy, lumbar region (M51.16)           Insurance (Authorized # of Visits):  Mdcr (10 per POC)            Authorizing Physician: Dr. Nickolas Morse MD visit: 11/5  Fall Risk: standard         Precautions: Lumbar tightness  Hamstrings: R mod tightness; L min tightness  Piriformis: R mod tightness; L min tightness      Special tests:   +SLR R; neg L SLR     Gait: pt ambulates on level ground with assistive device of STC, antalgia and decreased gait speed.         Ass lumbar paraspinal mobilization on the R    TherEx:  Nustep, level 3 - 5 mins  Standing hip abd and ext with RTB around ankles - 10 ea B  STS with hands on thighs, 2nd set without hands from slightly elevated mat table - 10  Lat amb with RTB around ankles -

## 2021-11-23 ENCOUNTER — OFFICE VISIT (OUTPATIENT)
Dept: PHYSICAL THERAPY | Facility: HOSPITAL | Age: 76
End: 2021-11-23
Attending: NEUROLOGICAL SURGERY
Payer: MEDICARE

## 2021-11-23 PROCEDURE — 97140 MANUAL THERAPY 1/> REGIONS: CPT

## 2021-11-23 PROCEDURE — 97112 NEUROMUSCULAR REEDUCATION: CPT

## 2021-11-23 PROCEDURE — 97110 THERAPEUTIC EXERCISES: CPT

## 2021-11-23 NOTE — PROGRESS NOTES
Dx: Intervertebral disc disorders with radiculopathy, lumbar region (M51.16)           Insurance (Authorized # of Visits):  Mdcr (10 per POC)            Authorizing Physician: Dr. Shaun Morse MD visit: 11/5  Fall Risk: standard         Precautions: Lumbar demonstrate improved ROM of lumbar spine to [facility] to assist with ADL's and other daily tasks. Pt will display increased core stabilization and postural awareness during functional tasks.    Pt will be independent with HEP to assist with pain management and hooklying - 2x15  B  SLS on foam - 3x20\" B  Tandem stance on foam - 3x20\" B     Neuro Nichole:  TA brace with PPT hooklying - 10x5\"  Deadbugs with ball in hooklying - 2x15  B  SLS on foam - 3x20\" B  Tandem stance on foam - 3x20\" B  Neuro Nichole:  TA brace

## 2021-12-01 ENCOUNTER — OFFICE VISIT (OUTPATIENT)
Dept: PHYSICAL THERAPY | Facility: HOSPITAL | Age: 76
End: 2021-12-01
Attending: NEUROLOGICAL SURGERY
Payer: MEDICARE

## 2021-12-01 PROCEDURE — 97110 THERAPEUTIC EXERCISES: CPT

## 2021-12-01 PROCEDURE — 97112 NEUROMUSCULAR REEDUCATION: CPT

## 2021-12-01 PROCEDURE — 97140 MANUAL THERAPY 1/> REGIONS: CPT

## 2021-12-01 NOTE — PROGRESS NOTES
ProgressSummary  Pt has attended 10 visits in Physical Therapy.      Dx: Intervertebral disc disorders with radiculopathy, lumbar region (M51.16)           Insurance (Authorized # of Visits):  Mdcr (10 per POC)            Authorizing Physician: Dr. Lesa Gottlieb from PT in order to further address ROM deficits, strength deficits and functional limitations as well as continue to progress HEP. Goals:   Pt will demonstrate improved ROM of lumbar spine to [facility] to assist with ADL's and other daily tasks.  - mostly me 11/16/2021  Tx: 7/10 11/18/2021  Tx: 8/10 11/23/2021  Tx: 9/10 12/1/2021  Tx: 10/20   Manual:   STM to R side lumbar spine and R posterolateral hip  Gentle lumbar paraspinal mobilization on the R  Manual:   STM to R side lumbar spine and R posterolater

## 2021-12-07 ENCOUNTER — OFFICE VISIT (OUTPATIENT)
Dept: PHYSICAL THERAPY | Facility: HOSPITAL | Age: 76
End: 2021-12-07
Attending: NEUROLOGICAL SURGERY
Payer: MEDICARE

## 2021-12-07 PROCEDURE — 97110 THERAPEUTIC EXERCISES: CPT

## 2021-12-07 PROCEDURE — 97112 NEUROMUSCULAR REEDUCATION: CPT

## 2021-12-07 PROCEDURE — 97140 MANUAL THERAPY 1/> REGIONS: CPT

## 2021-12-07 NOTE — PROGRESS NOTES
Dx: Intervertebral disc disorders with radiculopathy, lumbar region (M51.16)           Insurance (Authorized # of Visits):  Mdcr (10 per POC)            Authorizing Physician: Dr. Adan Morse MD visit: 11/5  Fall Risk: standard         Precautions: Lumbar score on the outcome measure by 20 points to demonstrate increased functional ability. - not met, score has decreased, however pt reports that she is improving   Pt will be able to lift light-mod weights/objects with proper mechanics and min to no pain.  - Nichole:  TA brace with PPT hooklying - 10x5\"  Deadbugs with ball in hooklying - 2x15 B (all 15 on one side before switching)   SLS on foam - 3x30\" B  Taps on 3 cones in line - 5 B  Neuro Nichole:  TA brace with PPT hooklying - 10x5\"  Deadbugs with ball in ho

## 2021-12-09 ENCOUNTER — OFFICE VISIT (OUTPATIENT)
Dept: PHYSICAL THERAPY | Facility: HOSPITAL | Age: 76
End: 2021-12-09
Attending: NEUROLOGICAL SURGERY
Payer: MEDICARE

## 2021-12-09 PROCEDURE — 97140 MANUAL THERAPY 1/> REGIONS: CPT

## 2021-12-09 PROCEDURE — 97110 THERAPEUTIC EXERCISES: CPT

## 2021-12-09 NOTE — PROGRESS NOTES
Dx: Intervertebral disc disorders with radiculopathy, lumbar region (M51.16)           Insurance (Authorized # of Visits):  Mdcr (10 per POC)            Authorizing Physician: Dr. Cris Hill  Next MD visit: 11/5  Fall Risk: standard         Precautions: Lumbar improved score on the outcome measure by 20 points to demonstrate increased functional ability.  - not met, score has decreased, however pt reports that she is improving   Pt will be able to lift light-mod weights/objects with proper mechanics and min to no on 3 cones in line - 5 B  Neuro Nichole:  TA brace with PPT hooklying - 10x5\"  Deadbugs with ball in hooklying - 2x15 B (all 15 on one side before switching)   Taps on 3 cones in line - 5 B  Neuro Nichole:  TA brace with PPT hooklying - 10x5\"  Deadbugs with ba

## 2021-12-14 ENCOUNTER — OFFICE VISIT (OUTPATIENT)
Dept: PHYSICAL THERAPY | Facility: HOSPITAL | Age: 76
End: 2021-12-14
Attending: NEUROLOGICAL SURGERY
Payer: MEDICARE

## 2021-12-14 PROCEDURE — 97112 NEUROMUSCULAR REEDUCATION: CPT

## 2021-12-14 PROCEDURE — 97140 MANUAL THERAPY 1/> REGIONS: CPT

## 2021-12-16 ENCOUNTER — APPOINTMENT (OUTPATIENT)
Dept: PHYSICAL THERAPY | Facility: HOSPITAL | Age: 76
End: 2021-12-16
Attending: NEUROLOGICAL SURGERY
Payer: MEDICARE

## 2021-12-16 ENCOUNTER — TELEPHONE (OUTPATIENT)
Dept: PHYSICAL THERAPY | Facility: HOSPITAL | Age: 76
End: 2021-12-16

## 2021-12-20 ENCOUNTER — OFFICE VISIT (OUTPATIENT)
Dept: PHYSICAL THERAPY | Facility: HOSPITAL | Age: 76
End: 2021-12-20
Attending: NEUROLOGICAL SURGERY
Payer: MEDICARE

## 2021-12-20 PROCEDURE — 97112 NEUROMUSCULAR REEDUCATION: CPT

## 2021-12-20 PROCEDURE — 97140 MANUAL THERAPY 1/> REGIONS: CPT

## 2021-12-20 PROCEDURE — 97110 THERAPEUTIC EXERCISES: CPT

## 2021-12-20 NOTE — PROGRESS NOTES
Dx: Intervertebral disc disorders with radiculopathy, lumbar region (M51.16)           Insurance (Authorized # of Visits):  Mdcr (10 per POC)            Authorizing Physician: Dr. Laurence Morse MD visit: 11/5  Fall Risk: standard         Precautions: Lumbar and continue to improve function. - met for current program  Pt will demonstrate decreased pain to <3/10 during functional tasks and ADL's.  - progressing  Pt will display improved score on the outcome measure by 20 points to demonstrate increased functiona position leaning on mat table - 2x10 B    Neuro Nichole:  TA brace with PPT hooklying - 10x5\"  Deadbugs with ball in hooklying - 2x15 B (all 15 on one side before switching)   Taps on 3 cones in line - 10 B  Lat amb - 2x20 ft  Tandem amb - ~20ft  Neuro Nichole:

## 2021-12-27 ENCOUNTER — OFFICE VISIT (OUTPATIENT)
Dept: PHYSICAL THERAPY | Facility: HOSPITAL | Age: 76
End: 2021-12-27
Attending: NEUROLOGICAL SURGERY
Payer: MEDICARE

## 2021-12-27 VITALS — SYSTOLIC BLOOD PRESSURE: 134 MMHG | HEART RATE: 65 BPM | DIASTOLIC BLOOD PRESSURE: 80 MMHG

## 2021-12-27 PROCEDURE — 97140 MANUAL THERAPY 1/> REGIONS: CPT

## 2021-12-27 PROCEDURE — 97110 THERAPEUTIC EXERCISES: CPT

## 2021-12-27 PROCEDURE — 97112 NEUROMUSCULAR REEDUCATION: CPT

## 2021-12-27 NOTE — PROGRESS NOTES
Dx: Intervertebral disc disorders with radiculopathy, lumbar region (M51.16)           Insurance (Authorized # of Visits):  Mdcr (10 per POC)            Authorizing Physician: Dr. Donna Morse MD visit: 11/5  Fall Risk: standard         Precautions: Lumbar independent with HEP to assist with pain management and continue to improve function. - met for current program  Pt will demonstrate decreased pain to <3/10 during functional tasks and ADL's.  - progressing  Pt will display improved score on the outcome sulma - 2x10 B  TherEx:  Nustep, level 5 - 5 mins  Hip ext in modified standing position leaning on mat table - 2x10 B   Leg press on shuttle, 25# - 2x15   Neuro Nichole:  TA brace with PPT hooklying - 10x5\"  Partial bridges with focus on TA brace and glut activat

## 2021-12-28 ENCOUNTER — OFFICE VISIT (OUTPATIENT)
Dept: PHYSICAL THERAPY | Facility: HOSPITAL | Age: 76
End: 2021-12-28
Attending: NEUROLOGICAL SURGERY
Payer: MEDICARE

## 2021-12-28 PROCEDURE — 97110 THERAPEUTIC EXERCISES: CPT

## 2021-12-28 PROCEDURE — 97112 NEUROMUSCULAR REEDUCATION: CPT

## 2021-12-28 PROCEDURE — 97140 MANUAL THERAPY 1/> REGIONS: CPT

## 2021-12-28 NOTE — PROGRESS NOTES
Dx: Intervertebral disc disorders with radiculopathy, lumbar region (M51.16)           Insurance (Authorized # of Visits):  Mdcr (10 per POC)            Authorizing Physician: Dr. Mana Murray  Next MD visit: 11/5  Fall Risk: standard         Precautions: Lumbar ability. - not met, score has decreased, however pt reports that she is improving   Pt will be able to lift light-mod weights/objects with proper mechanics and min to no pain.  - progressing  Pt will be able to ambulate >2 miles with min to no increased sisi with SLR - 2x15 B  Neuro Nichole:  TA brace with PPT hooklying - 10x5\"  Partial bridges with focus on TA brace and glut activation - 2x15  TA brace with marches, alternating - 15 B   TA brace with SLR - 2x15 B  Neuro Nichole:  TA brace with PPT hooklying - 10x5

## 2021-12-30 ENCOUNTER — APPOINTMENT (OUTPATIENT)
Dept: PHYSICAL THERAPY | Facility: HOSPITAL | Age: 76
End: 2021-12-30
Attending: NEUROLOGICAL SURGERY
Payer: MEDICARE

## 2022-01-03 ENCOUNTER — OFFICE VISIT (OUTPATIENT)
Dept: INTERNAL MEDICINE CLINIC | Facility: CLINIC | Age: 77
End: 2022-01-03
Payer: MEDICARE

## 2022-01-03 ENCOUNTER — OFFICE VISIT (OUTPATIENT)
Dept: PHYSICAL THERAPY | Facility: HOSPITAL | Age: 77
End: 2022-01-03
Attending: NEUROLOGICAL SURGERY
Payer: MEDICARE

## 2022-01-03 DIAGNOSIS — R42 DIZZINESS: ICD-10-CM

## 2022-01-03 DIAGNOSIS — Z12.31 VISIT FOR SCREENING MAMMOGRAM: ICD-10-CM

## 2022-01-03 DIAGNOSIS — R73.01 IFG (IMPAIRED FASTING GLUCOSE): Primary | ICD-10-CM

## 2022-01-03 DIAGNOSIS — T14.8XXA MUSCLE STRAIN: ICD-10-CM

## 2022-01-03 DIAGNOSIS — M48.061 LUMBAR FORAMINAL STENOSIS: ICD-10-CM

## 2022-01-03 LAB
CARTRIDGE LOT#: 867 NUMERIC
HEMOGLOBIN A1C: 5.6 % (ref 4.3–5.6)

## 2022-01-03 PROCEDURE — 97140 MANUAL THERAPY 1/> REGIONS: CPT

## 2022-01-03 PROCEDURE — 97112 NEUROMUSCULAR REEDUCATION: CPT

## 2022-01-03 PROCEDURE — 83036 HEMOGLOBIN GLYCOSYLATED A1C: CPT | Performed by: INTERNAL MEDICINE

## 2022-01-03 PROCEDURE — 99214 OFFICE O/P EST MOD 30 MIN: CPT | Performed by: INTERNAL MEDICINE

## 2022-01-03 PROCEDURE — 3074F SYST BP LT 130 MM HG: CPT | Performed by: INTERNAL MEDICINE

## 2022-01-03 PROCEDURE — 97110 THERAPEUTIC EXERCISES: CPT

## 2022-01-03 PROCEDURE — 3008F BODY MASS INDEX DOCD: CPT | Performed by: INTERNAL MEDICINE

## 2022-01-03 PROCEDURE — 3079F DIAST BP 80-89 MM HG: CPT | Performed by: INTERNAL MEDICINE

## 2022-01-03 RX ORDER — ALBUTEROL SULFATE 90 UG/1
2 AEROSOL, METERED RESPIRATORY (INHALATION) EVERY 6 HOURS PRN
Qty: 18 G | Refills: 0 | Status: CANCELLED | OUTPATIENT
Start: 2022-01-03

## 2022-01-03 RX ORDER — OMEPRAZOLE 40 MG/1
40 CAPSULE, DELAYED RELEASE ORAL DAILY
Qty: 30 CAPSULE | Refills: 11 | Status: CANCELLED | OUTPATIENT
Start: 2022-01-03

## 2022-01-03 RX ORDER — LORAZEPAM 1 MG/1
1 TABLET ORAL
Qty: 30 TABLET | Refills: 0 | Status: CANCELLED | OUTPATIENT
Start: 2022-01-03

## 2022-01-03 RX ORDER — ETANERCEPT 50 MG/ML
SOLUTION SUBCUTANEOUS
COMMUNITY
Start: 2021-10-26

## 2022-01-03 RX ORDER — LORAZEPAM 1 MG/1
1 TABLET ORAL
Qty: 30 TABLET | Refills: 1 | Status: SHIPPED | OUTPATIENT
Start: 2022-01-03

## 2022-01-03 NOTE — PROGRESS NOTES
Dx: Intervertebral disc disorders with radiculopathy, lumbar region (M51.16)           Insurance (Authorized # of Visits):  Mdcr (10 per POC)            Authorizing Physician: Dr. Parish Morse MD visit: 11/5  Fall Risk: standard         Precautions: Lumbar - not met, score has decreased, however pt reports that she is improving   Pt will be able to lift light-mod weights/objects with proper mechanics and min to no pain.  - progressing  Pt will be able to ambulate >2 miles with min to no increased pain to impr Nichole:  TA brace with PPT hooklying - 10x5\"  Deadbugs with ball in hooklying - 2x15 B (all 15 on one side before switching)   Bridges with focus on TA brace and glut activation with black band around knees - 2x15  TA brace with marches, alternating - 15 B

## 2022-01-03 NOTE — PROGRESS NOTES
HPI:    Patient ID: Kade Huston is a 77year old female. HPI    Patient is here for follow-up.     Status post back surgery on 9/14/2021, L4-5 extreme lateral interbody fusion with metallic cage and cadaver bone graft through the site plus L4 in inferior L3 laminectomy, foraminotomy, L4-5 discectomy and decompression of the spinal nerves with metallic B3-3 trans facet through the back      Family History   Problem Relation Age of Onset   • Arthritis Father    • Other (Other) Father         stroke (81.6 kg)  08/06/21 : 182 lb (82.6 kg)     BP Readings from Last 3 Encounters:  01/03/22 : 120/80  12/27/21 : 134/80  10/04/21 : 138/72       Physical Exam  Vitals and nursing note reviewed. Constitutional:       Appearance: Normal appearance.       Comme diabetes  We discussed importance of low-carb diet and portion control. .  Aim for weight loss  - HEMOGLOBIN A1C    2. Muscle strain  Both hips and knees full range of motion.   Straight leg negative bilateral  Apply the diclofenac gel to right hip 2 g 4 jose e

## 2022-01-04 VITALS
TEMPERATURE: 97 F | HEART RATE: 83 BPM | WEIGHT: 172.38 LBS | HEIGHT: 65 IN | RESPIRATION RATE: 16 BRPM | BODY MASS INDEX: 28.72 KG/M2 | SYSTOLIC BLOOD PRESSURE: 120 MMHG | OXYGEN SATURATION: 98 % | DIASTOLIC BLOOD PRESSURE: 80 MMHG

## 2022-01-05 ENCOUNTER — APPOINTMENT (OUTPATIENT)
Dept: PHYSICAL THERAPY | Facility: HOSPITAL | Age: 77
End: 2022-01-05
Attending: NEUROLOGICAL SURGERY
Payer: MEDICARE

## 2022-01-06 ENCOUNTER — OFFICE VISIT (OUTPATIENT)
Dept: PHYSICAL THERAPY | Facility: HOSPITAL | Age: 77
End: 2022-01-06
Attending: NEUROLOGICAL SURGERY
Payer: MEDICARE

## 2022-01-06 PROCEDURE — 97110 THERAPEUTIC EXERCISES: CPT

## 2022-01-06 PROCEDURE — 97140 MANUAL THERAPY 1/> REGIONS: CPT

## 2022-01-06 PROCEDURE — 97112 NEUROMUSCULAR REEDUCATION: CPT

## 2022-01-06 NOTE — PROGRESS NOTES
Dx: Intervertebral disc disorders with radiculopathy, lumbar region (M51.16)           Insurance (Authorized # of Visits):  Mdcr (10 per POC)            Authorizing Physician: Dr. Savanah Morse MD visit: 5/2022  Fall Risk: standard         Precautions: Che Joe pain to <3/10 during functional tasks and ADL's. - progressing  Pt will display improved score on the outcome measure by 20 points to demonstrate increased functional ability.  - not met, score has decreased, however pt reports that she is improving   Pt wi hooklying - 2x15 B (all 15 on one side before switching)   Bridges with focus on TA brace and glut activation with black band around knees - 2x15  TA brace with marches, alternating - 15 B   TA brace with SLR - 2x15 B  Neuro Nichole:  TA brace with PPT hookly

## 2022-01-10 ENCOUNTER — OFFICE VISIT (OUTPATIENT)
Dept: PHYSICAL THERAPY | Facility: HOSPITAL | Age: 77
End: 2022-01-10
Attending: NEUROLOGICAL SURGERY
Payer: MEDICARE

## 2022-01-10 PROCEDURE — 97140 MANUAL THERAPY 1/> REGIONS: CPT

## 2022-01-10 PROCEDURE — 97110 THERAPEUTIC EXERCISES: CPT

## 2022-01-10 PROCEDURE — 97112 NEUROMUSCULAR REEDUCATION: CPT

## 2022-01-10 NOTE — PROGRESS NOTES
Dx: Intervertebral disc disorders with radiculopathy, lumbar region (M51.16)           Insurance (Authorized # of Visits):  Mdcr (10 per POC)            Authorizing Physician: Dr. Siomara Haines  Next MD visit: 5/2022  Fall Risk: standard         Precautions: Reji Mayer - not met, score has decreased, however pt reports that she is improving   Pt will be able to lift light-mod weights/objects with proper mechanics and min to no pain.  - progressing  Pt will be able to ambulate >2 miles with min to no increased pain to impr 10x5\"  Sanford Tavernier with ball in hooklying - 2x15 B (all 15 on one side before switching)   Bridges with focus on TA brace and glut activation with [employer] [place] around knees - 2x15  SLS taps on cone with focus on glut activation - 15 B  Neuro Nichole:  TA brac

## 2022-01-12 ENCOUNTER — OFFICE VISIT (OUTPATIENT)
Dept: PHYSICAL THERAPY | Facility: HOSPITAL | Age: 77
End: 2022-01-12
Attending: NEUROLOGICAL SURGERY
Payer: MEDICARE

## 2022-01-12 PROCEDURE — 97110 THERAPEUTIC EXERCISES: CPT

## 2022-01-12 PROCEDURE — 97140 MANUAL THERAPY 1/> REGIONS: CPT

## 2022-01-12 NOTE — PROGRESS NOTES
Dx: Intervertebral disc disorders with radiculopathy, lumbar region (M51.16)           Insurance (Authorized # of Visits):  Mdcr (10 per POC)            Authorizing Physician: Dr. New Morse MD visit: 5/2022  Fall Risk: standard         Precautions: Al Lee Pt will be able to lift light-mod weights/objects with proper mechanics and min to no pain. - progressing  Pt will be able to ambulate >2 miles with min to no increased pain to improved overall endurance and functional ability.  - progressing      Plan: C 10x5\"  Deadbugs with ball in hooklying - 3x15 B (all 15 on one side before switching)   SLS taps on cone with focus on glut activation - 12x5 B   Tandem balance on airex pad - 2x20\" B  Neuro Nichole:  TA brace with PPT hooklying - 10x5\"  Deadbugs with ball

## 2022-01-17 ENCOUNTER — OFFICE VISIT (OUTPATIENT)
Dept: PHYSICAL THERAPY | Facility: HOSPITAL | Age: 77
End: 2022-01-17
Attending: NEUROLOGICAL SURGERY
Payer: MEDICARE

## 2022-01-17 PROCEDURE — 97112 NEUROMUSCULAR REEDUCATION: CPT

## 2022-01-17 PROCEDURE — 97140 MANUAL THERAPY 1/> REGIONS: CPT

## 2022-01-17 PROCEDURE — 97110 THERAPEUTIC EXERCISES: CPT

## 2022-01-17 NOTE — PROGRESS NOTES
Dx: Intervertebral disc disorders with radiculopathy, lumbar region (M51.16)           Insurance (Authorized # of Visits):  Mdcr (10 per POC)            Authorizing Physician: Dr. Jennifer Morse MD visit: 5/2022  Fall Risk: standard         Precautions: Nathan Blanchard and ADL's. - progressing  Pt will display improved score on the outcome measure by 20 points to demonstrate increased functional ability.  - not met, score has decreased, however pt reports that she is improving   Pt will be able to lift light-mod weights/o with ball in hooklying - 3x15 B (all 15 on one side before switching)   SLS taps on cone with focus on glut activation - 12x5 B   Tandem balance on airex pad - 2x20\" B  Neuro Nichole:  TA brace with PPT hooklying - 10x5\"  Deadbugs with ball with feet up - 2

## 2022-01-19 ENCOUNTER — OFFICE VISIT (OUTPATIENT)
Dept: PHYSICAL THERAPY | Facility: HOSPITAL | Age: 77
End: 2022-01-19
Attending: NEUROLOGICAL SURGERY
Payer: MEDICARE

## 2022-01-19 PROCEDURE — 97140 MANUAL THERAPY 1/> REGIONS: CPT

## 2022-01-19 PROCEDURE — 97110 THERAPEUTIC EXERCISES: CPT

## 2022-01-19 NOTE — PROGRESS NOTES
Dx: Intervertebral disc disorders with radiculopathy, lumbar region (M51.16)           Insurance (Authorized # of Visits):  Mdcr (10 per POC)            Authorizing Physician: Dr. Иван Morse MD visit: 5/2022  Fall Risk: standard         Precautions: Deann Hankins score has decreased, however pt reports that she is improving   Pt will be able to lift light-mod weights/objects with proper mechanics and min to no pain.  - progressing  Pt will be able to ambulate >2 miles with min to no increased pain to improved overal 2x4 B (held due to pain)   SLS taps on cone with focus on glut activation - 12x5 B   Tandem balance on airex pad - 2x20\" B  Neuro Nichole:  TA brace with PPT hooklying - 10x5\"  Bridge with march - 2x10 B Neuro Nichole:  TA brace with PPT hooklying - 10x5\"  John Salomon

## 2022-01-24 ENCOUNTER — OFFICE VISIT (OUTPATIENT)
Dept: PHYSICAL THERAPY | Facility: HOSPITAL | Age: 77
End: 2022-01-24
Attending: NEUROLOGICAL SURGERY
Payer: MEDICARE

## 2022-01-24 PROCEDURE — 97110 THERAPEUTIC EXERCISES: CPT

## 2022-01-24 PROCEDURE — 97140 MANUAL THERAPY 1/> REGIONS: CPT

## 2022-01-24 NOTE — PROGRESS NOTES
Dx: Intervertebral disc disorders with radiculopathy, lumbar region (M51.16)           Insurance (Authorized # of Visits):  Mdcr (10 per POC)            Authorizing Physician: Dr. Fred Morse MD visit: 5/2022  Fall Risk: standard         Precautions: Joseph Drake <3/10 during functional tasks and ADL's. - progressing  Pt will display improved score on the outcome measure by 20 points to demonstrate increased functional ability.  - not met, score has decreased, however pt reports that she is improving   Pt will be ab TherEx:  Nustep, level 5 - 5 mins  Seated hip hinge/lumbar stretch with ball - 5x10\"  Seated hip hinge/lumbar stretch with slight R sidebody stretch with ball - 5x10\"  Sidelying hip abd - 3x15 B   Leg press on shuttle, 50# - 2x15   Neuro Nichole:  TA brace

## 2022-01-26 ENCOUNTER — APPOINTMENT (OUTPATIENT)
Dept: PHYSICAL THERAPY | Facility: HOSPITAL | Age: 77
End: 2022-01-26
Attending: NEUROLOGICAL SURGERY
Payer: MEDICARE

## 2022-01-27 ENCOUNTER — OFFICE VISIT (OUTPATIENT)
Dept: PHYSICAL THERAPY | Facility: HOSPITAL | Age: 77
End: 2022-01-27
Attending: NEUROLOGICAL SURGERY
Payer: MEDICARE

## 2022-01-27 PROCEDURE — 97110 THERAPEUTIC EXERCISES: CPT

## 2022-01-27 PROCEDURE — 97140 MANUAL THERAPY 1/> REGIONS: CPT

## 2022-01-27 NOTE — PROGRESS NOTES
Marie  Pt has attended 24 visits in Physical Therapy.      Dx: Intervertebral disc disorders with radiculopathy, lumbar region (M51.16)           Insurance (Authorized # of Visits):  Mdcr (10 per POC)            Authorizing Physician: Dr. Yessica Kaye with pain management and continue to improve function.  - met for current program  Pt will demonstrate decreased pain to <3/10 during functional tasks and ADL's. - mostly met  Pt will be able to lift light-mod weights/objects with proper mechanics and min t Leg press on shuttle, 50# - 2x15 TherEx:  Nustep, level 5 - 5 mins  Seated hip hinge/lumbar stretch with ball - 5x10\"  Seated hip hinge/lumbar stretch with slight R sidebody stretch with ball - 5x10\"  Sidelying hip abd - 3x15 B   Leg press on shuttle,

## 2022-03-01 ENCOUNTER — HOSPITAL ENCOUNTER (OUTPATIENT)
Dept: GENERAL RADIOLOGY | Facility: HOSPITAL | Age: 77
Discharge: HOME | End: 2022-03-01
Attending: PHYSICIAN ASSISTANT
Payer: MEDICARE

## 2022-03-01 DIAGNOSIS — M51.16 DISPLACEMENT OF LUMBAR DISC WITH RADICULOPATHY: ICD-10-CM

## 2022-03-01 PROCEDURE — 72100 X-RAY EXAM L-S SPINE 2/3 VWS: CPT | Performed by: PHYSICIAN ASSISTANT

## 2022-03-21 ENCOUNTER — LAB ENCOUNTER (OUTPATIENT)
Dept: LAB | Facility: HOSPITAL | Age: 77
End: 2022-03-21
Attending: INTERNAL MEDICINE
Payer: MEDICARE

## 2022-03-21 DIAGNOSIS — M05.79 SEROPOSITIVE RHEUMATOID ARTHRITIS OF MULTIPLE SITES (HCC): ICD-10-CM

## 2022-03-21 DIAGNOSIS — Z51.81 THERAPEUTIC DRUG MONITORING: ICD-10-CM

## 2022-03-21 LAB
ALBUMIN SERPL-MCNC: 3.8 G/DL (ref 3.4–5)
AST SERPL-CCNC: 18 U/L (ref 15–37)
BASOPHILS # BLD AUTO: 0.04 X10(3) UL (ref 0–0.2)
BASOPHILS NFR BLD AUTO: 0.6 %
CREAT BLD-MCNC: 0.94 MG/DL
CRP SERPL-MCNC: <0.29 MG/DL (ref ?–0.3)
DEPRECATED RDW RBC AUTO: 44.7 FL (ref 35.1–46.3)
EOSINOPHIL # BLD AUTO: 0.09 X10(3) UL (ref 0–0.7)
EOSINOPHIL NFR BLD AUTO: 1.4 %
ERYTHROCYTE [DISTWIDTH] IN BLOOD BY AUTOMATED COUNT: 16.4 % (ref 11–15)
ERYTHROCYTE [SEDIMENTATION RATE] IN BLOOD: 23 MM/HR
HCT VFR BLD AUTO: 38.8 %
HGB BLD-MCNC: 11.9 G/DL
IMM GRANULOCYTES # BLD AUTO: 0.01 X10(3) UL (ref 0–1)
IMM GRANULOCYTES NFR BLD: 0.2 %
LYMPHOCYTES # BLD AUTO: 2 X10(3) UL (ref 1–4)
LYMPHOCYTES NFR BLD AUTO: 30.2 %
MCH RBC QN AUTO: 23.2 PG (ref 26–34)
MCHC RBC AUTO-ENTMCNC: 30.7 G/DL (ref 31–37)
MCV RBC AUTO: 75.6 FL
MONOCYTES # BLD AUTO: 0.62 X10(3) UL (ref 0.1–1)
MONOCYTES NFR BLD AUTO: 9.4 %
NEUTROPHILS # BLD AUTO: 3.87 X10 (3) UL (ref 1.5–7.7)
NEUTROPHILS # BLD AUTO: 3.87 X10(3) UL (ref 1.5–7.7)
NEUTROPHILS NFR BLD AUTO: 58.2 %
PLATELET # BLD AUTO: 282 10(3)UL (ref 150–450)
RBC # BLD AUTO: 5.13 X10(6)UL
WBC # BLD AUTO: 6.6 X10(3) UL (ref 4–11)

## 2022-03-21 PROCEDURE — 86140 C-REACTIVE PROTEIN: CPT

## 2022-03-21 PROCEDURE — 85652 RBC SED RATE AUTOMATED: CPT

## 2022-03-21 PROCEDURE — 84450 TRANSFERASE (AST) (SGOT): CPT

## 2022-03-21 PROCEDURE — 82565 ASSAY OF CREATININE: CPT

## 2022-03-21 PROCEDURE — 82040 ASSAY OF SERUM ALBUMIN: CPT

## 2022-03-21 PROCEDURE — 85025 COMPLETE CBC W/AUTO DIFF WBC: CPT

## 2022-03-21 PROCEDURE — 36415 COLL VENOUS BLD VENIPUNCTURE: CPT

## 2022-03-24 NOTE — PROGRESS NOTES
Mike is a 30-year-old woman with CCP and RF positive rheumatoid arthritis, who I last saw in the office July 2nd of 2021. Humira 40 mg wasn't working. Her arthritis was severe in her ankles, knees, shoulders, wrists, and hands. She was switched back to Enbrel SureClick 50 mg once a week, and her inflammatory arthritis is again much improved. Just this morning she has some swelling of her right second and third finger PIP joints. She had low back surgery in the summer 2021. She is better. She still has severe pain in her right side which she thinks is coming from her back. It hurts with movement. Plain x-rays have been okay although she has significant degenerative disc disease in both sides of her L4-5 fusion. She has not had infections or medical problems. Methotrexate 10 mg weekly had not worked. Prednisone helps her. Labs were repeated March 21st of 2022. CBC was normal, except hemoglobin of 11.9. Creatinine, AST, and albumin were normal.  C-reactive protein was back to normal at less than 0.29, and sed rate was back to normal at 23. Review of Systems   Constitutional: Negative for fever, chills, diaphoresis and fatigue. Respiratory: Negative for shortness of breath. Cardiovascular: Negative for chest pain. Gastrointestinal: Negative for abdominal pain. Skin: Negative for rash. Hematological: Negative for adenopathy. Allergies:No Known Allergies     PHYSICAL EXAM:   Blood pressure 137/80, pulse 80, height 5' 5\" (1.651 m), weight 174 lb (78.9 kg). Constitutional: She is oriented to person, place, and time. She appears well-developed and well-nourished. HENT:   Head: Normocephalic. Eyes: Conjunctivae are normal.   Cardiovascular: Normal rate, regular rhythm and normal heart sounds. Pulmonary/Chest: Effort normal and breath sounds normal.   Abdominal: Soft. Bowel sounds are normal. She exhibits no mass. There is no tenderness.  There is no rebound and no guarding. Musculoskeletal: She exhibits no edema. There is not synovitis across her wrists or hand MCP joints. Possible mild synovitis right second and third finger PIP joints. She can make tight fists. Shoulder motion is good. She can stand on either leg alone. Lymphadenopathy:     She has no cervical adenopathy. Neurological: She is alert and oriented to person, place, and time. Skin: No rash noted. Psychiatric: She has a normal mood and affect. ASSESSMENT/PLAN:     1. CCP and RF positive Ra (rheumatoid arthritis). Again generally controlled on Enbrel SureClick 50 mg weekly. Her RX was refilled. She will schedule back in 6 months. 2. Encounter for therapeutic drug monitoring. 3. H/O rotator cuff tendinitis. 4.  Borderline diabetes, high cholesterol. Diet. 5.  Status post lumbar spine surgery in the summer 2021. Persistent pain on the right side. Will add back meloxicam 7.5 mg daily with food.

## 2022-03-29 ENCOUNTER — OFFICE VISIT (OUTPATIENT)
Dept: RHEUMATOLOGY | Facility: CLINIC | Age: 77
End: 2022-03-29
Payer: MEDICARE

## 2022-03-29 VITALS
BODY MASS INDEX: 28.99 KG/M2 | DIASTOLIC BLOOD PRESSURE: 80 MMHG | SYSTOLIC BLOOD PRESSURE: 137 MMHG | HEART RATE: 80 BPM | WEIGHT: 174 LBS | HEIGHT: 65 IN

## 2022-03-29 DIAGNOSIS — Z51.81 THERAPEUTIC DRUG MONITORING: ICD-10-CM

## 2022-03-29 DIAGNOSIS — M05.79 SEROPOSITIVE RHEUMATOID ARTHRITIS OF MULTIPLE SITES (HCC): Primary | ICD-10-CM

## 2022-03-29 PROCEDURE — 99214 OFFICE O/P EST MOD 30 MIN: CPT | Performed by: INTERNAL MEDICINE

## 2022-03-29 RX ORDER — MELOXICAM 7.5 MG/1
TABLET ORAL
Qty: 30 TABLET | Refills: 5 | Status: SHIPPED | OUTPATIENT
Start: 2022-03-29

## 2022-03-29 RX ORDER — ETANERCEPT 50 MG/ML
SOLUTION SUBCUTANEOUS
Qty: 4 EACH | Refills: 5 | Status: SHIPPED | OUTPATIENT
Start: 2022-03-29

## 2022-06-09 ENCOUNTER — LAB ENCOUNTER (OUTPATIENT)
Dept: LAB | Facility: HOSPITAL | Age: 77
End: 2022-06-09
Attending: INTERNAL MEDICINE
Payer: MEDICARE

## 2022-06-09 DIAGNOSIS — M05.79 SEROPOSITIVE RHEUMATOID ARTHRITIS OF MULTIPLE SITES (HCC): ICD-10-CM

## 2022-06-09 DIAGNOSIS — Z51.81 THERAPEUTIC DRUG MONITORING: ICD-10-CM

## 2022-06-09 LAB
ALBUMIN SERPL-MCNC: 3.8 G/DL (ref 3.4–5)
AST SERPL-CCNC: 28 U/L (ref 15–37)
BASOPHILS # BLD AUTO: 0.04 X10(3) UL (ref 0–0.2)
BASOPHILS NFR BLD AUTO: 0.6 %
CREAT BLD-MCNC: 1.02 MG/DL
CRP SERPL-MCNC: <0.29 MG/DL (ref ?–0.3)
DEPRECATED RDW RBC AUTO: 39.3 FL (ref 35.1–46.3)
EOSINOPHIL # BLD AUTO: 0.13 X10(3) UL (ref 0–0.7)
EOSINOPHIL NFR BLD AUTO: 2.1 %
ERYTHROCYTE [DISTWIDTH] IN BLOOD BY AUTOMATED COUNT: 14.6 % (ref 11–15)
ERYTHROCYTE [SEDIMENTATION RATE] IN BLOOD: 22 MM/HR
HCT VFR BLD AUTO: 39.1 %
HGB BLD-MCNC: 12.1 G/DL
IMM GRANULOCYTES # BLD AUTO: 0.02 X10(3) UL (ref 0–1)
IMM GRANULOCYTES NFR BLD: 0.3 %
LYMPHOCYTES # BLD AUTO: 2.2 X10(3) UL (ref 1–4)
LYMPHOCYTES NFR BLD AUTO: 35 %
MCH RBC QN AUTO: 23.4 PG (ref 26–34)
MCHC RBC AUTO-ENTMCNC: 30.9 G/DL (ref 31–37)
MCV RBC AUTO: 75.6 FL
MONOCYTES # BLD AUTO: 0.77 X10(3) UL (ref 0.1–1)
MONOCYTES NFR BLD AUTO: 12.2 %
NEUTROPHILS # BLD AUTO: 3.13 X10 (3) UL (ref 1.5–7.7)
NEUTROPHILS # BLD AUTO: 3.13 X10(3) UL (ref 1.5–7.7)
NEUTROPHILS NFR BLD AUTO: 49.8 %
PLATELET # BLD AUTO: 269 10(3)UL (ref 150–450)
RBC # BLD AUTO: 5.17 X10(6)UL
WBC # BLD AUTO: 6.3 X10(3) UL (ref 4–11)

## 2022-06-09 PROCEDURE — 82565 ASSAY OF CREATININE: CPT

## 2022-06-09 PROCEDURE — 86140 C-REACTIVE PROTEIN: CPT

## 2022-06-09 PROCEDURE — 36415 COLL VENOUS BLD VENIPUNCTURE: CPT

## 2022-06-09 PROCEDURE — 85652 RBC SED RATE AUTOMATED: CPT

## 2022-06-09 PROCEDURE — 84450 TRANSFERASE (AST) (SGOT): CPT

## 2022-06-09 PROCEDURE — 85025 COMPLETE CBC W/AUTO DIFF WBC: CPT

## 2022-06-09 PROCEDURE — 82040 ASSAY OF SERUM ALBUMIN: CPT

## 2022-06-14 ENCOUNTER — OFFICE VISIT (OUTPATIENT)
Dept: INTERNAL MEDICINE CLINIC | Facility: CLINIC | Age: 77
End: 2022-06-14
Payer: COMMERCIAL

## 2022-06-14 VITALS
BODY MASS INDEX: 29.99 KG/M2 | HEART RATE: 71 BPM | OXYGEN SATURATION: 97 % | TEMPERATURE: 96 F | SYSTOLIC BLOOD PRESSURE: 132 MMHG | WEIGHT: 180 LBS | DIASTOLIC BLOOD PRESSURE: 80 MMHG | HEIGHT: 65 IN

## 2022-06-14 DIAGNOSIS — R10.9 RIGHT FLANK PAIN, CHRONIC: Primary | ICD-10-CM

## 2022-06-14 DIAGNOSIS — F41.9 ANXIETY: ICD-10-CM

## 2022-06-14 DIAGNOSIS — M48.061 LUMBAR FORAMINAL STENOSIS: ICD-10-CM

## 2022-06-14 DIAGNOSIS — M05.79 SEROPOSITIVE RHEUMATOID ARTHRITIS OF MULTIPLE SITES (HCC): ICD-10-CM

## 2022-06-14 DIAGNOSIS — Z12.11 ENCOUNTER FOR FIT (FECAL IMMUNOCHEMICAL TEST) SCREENING: ICD-10-CM

## 2022-06-14 DIAGNOSIS — G89.29 RIGHT FLANK PAIN, CHRONIC: Primary | ICD-10-CM

## 2022-06-14 DIAGNOSIS — H81.12 BPV (BENIGN POSITIONAL VERTIGO), LEFT: ICD-10-CM

## 2022-06-14 DIAGNOSIS — R06.83 SNORING: ICD-10-CM

## 2022-06-14 PROCEDURE — 3079F DIAST BP 80-89 MM HG: CPT | Performed by: INTERNAL MEDICINE

## 2022-06-14 PROCEDURE — 3008F BODY MASS INDEX DOCD: CPT | Performed by: INTERNAL MEDICINE

## 2022-06-14 PROCEDURE — 99214 OFFICE O/P EST MOD 30 MIN: CPT | Performed by: INTERNAL MEDICINE

## 2022-06-14 PROCEDURE — 3075F SYST BP GE 130 - 139MM HG: CPT | Performed by: INTERNAL MEDICINE

## 2022-06-20 ENCOUNTER — OFFICE VISIT (OUTPATIENT)
Dept: OTOLARYNGOLOGY | Facility: CLINIC | Age: 77
End: 2022-06-20
Payer: MEDICARE

## 2022-06-20 VITALS — TEMPERATURE: 98 F | WEIGHT: 180 LBS | BODY MASS INDEX: 29.99 KG/M2 | HEIGHT: 65 IN

## 2022-06-20 DIAGNOSIS — K21.9 LARYNGOPHARYNGEAL REFLUX (LPR): ICD-10-CM

## 2022-06-20 DIAGNOSIS — J31.0 RHINITIS MEDICAMENTOSA: ICD-10-CM

## 2022-06-20 DIAGNOSIS — R06.83 SNORING: Primary | ICD-10-CM

## 2022-06-20 DIAGNOSIS — T48.5X5A RHINITIS MEDICAMENTOSA: ICD-10-CM

## 2022-06-20 PROCEDURE — 99214 OFFICE O/P EST MOD 30 MIN: CPT | Performed by: OTOLARYNGOLOGY

## 2022-06-20 RX ORDER — METHYLPREDNISOLONE 4 MG/1
TABLET ORAL
Qty: 1 EACH | Refills: 0 | Status: SHIPPED | OUTPATIENT
Start: 2022-06-20

## 2022-06-20 RX ORDER — OMEPRAZOLE 40 MG/1
40 CAPSULE, DELAYED RELEASE ORAL DAILY
Qty: 30 CAPSULE | Refills: 2 | Status: SHIPPED | OUTPATIENT
Start: 2022-06-20

## 2022-06-20 RX ORDER — FLUTICASONE PROPIONATE 50 MCG
2 SPRAY, SUSPENSION (ML) NASAL DAILY
Qty: 3 EACH | Refills: 4 | Status: SHIPPED | OUTPATIENT
Start: 2022-06-20

## 2022-07-05 ENCOUNTER — OFFICE VISIT (OUTPATIENT)
Dept: PHYSICAL MEDICINE AND REHAB | Facility: CLINIC | Age: 77
End: 2022-07-05
Payer: COMMERCIAL

## 2022-07-05 VITALS
DIASTOLIC BLOOD PRESSURE: 80 MMHG | SYSTOLIC BLOOD PRESSURE: 140 MMHG | WEIGHT: 180 LBS | BODY MASS INDEX: 29.99 KG/M2 | HEART RATE: 76 BPM | OXYGEN SATURATION: 98 % | HEIGHT: 65 IN

## 2022-07-05 DIAGNOSIS — M53.3 PAIN OF RIGHT SACROILIAC JOINT: Primary | ICD-10-CM

## 2022-07-05 PROCEDURE — 3079F DIAST BP 80-89 MM HG: CPT | Performed by: PHYSICAL MEDICINE & REHABILITATION

## 2022-07-05 PROCEDURE — 3008F BODY MASS INDEX DOCD: CPT | Performed by: PHYSICAL MEDICINE & REHABILITATION

## 2022-07-05 PROCEDURE — 3077F SYST BP >= 140 MM HG: CPT | Performed by: PHYSICAL MEDICINE & REHABILITATION

## 2022-07-05 PROCEDURE — 99214 OFFICE O/P EST MOD 30 MIN: CPT | Performed by: PHYSICAL MEDICINE & REHABILITATION

## 2022-07-05 RX ORDER — DULOXETIN HYDROCHLORIDE 30 MG/1
30 CAPSULE, DELAYED RELEASE ORAL DAILY
Qty: 60 CAPSULE | Refills: 0 | Status: SHIPPED | OUTPATIENT
Start: 2022-07-05

## 2022-07-20 ENCOUNTER — TELEPHONE (OUTPATIENT)
Dept: INTERNAL MEDICINE CLINIC | Facility: CLINIC | Age: 77
End: 2022-07-20

## 2022-08-23 ENCOUNTER — PATIENT MESSAGE (OUTPATIENT)
Dept: INTERNAL MEDICINE CLINIC | Facility: CLINIC | Age: 77
End: 2022-08-23

## 2022-08-24 RX ORDER — LORAZEPAM 1 MG/1
1 TABLET ORAL
Qty: 30 TABLET | Refills: 1 | Status: SHIPPED | OUTPATIENT
Start: 2022-08-24

## 2022-08-24 NOTE — TELEPHONE ENCOUNTER
From: Ramandeep Glass  To: Sukhdeep Lynne MD  Sent: 8/23/2022 11:07 PM CDT  Subject: Vanessa Stuart  I am leaving for vacation soon and need a prescription of Ativan sent to my pharmacy.      Thank you

## 2022-08-30 DIAGNOSIS — M53.3 PAIN OF RIGHT SACROILIAC JOINT: ICD-10-CM

## 2022-08-31 NOTE — TELEPHONE ENCOUNTER
Refill Request    Medication request: DULoxetine 30 MG Oral Cap DR Particles    LOV:7/5/2022 Jose De Jesus Husbands, DO   Due back to clinic per last office note:  Juana Has: Visit date not found      [place]: [place]5/22 #60    Urine drug screen (if applicable): None  Pain contract: None    LOV plan (if weaning or changing medications): We discussed duloxetine and right SIJ injection under fluorsoscopy; patient defers injection, will start duloxetine 30mg qDaily. Condition update: states medication has showed improvement. Denies side effects/worsening symptoms. Would like to continue rx.

## 2022-09-01 RX ORDER — DULOXETIN HYDROCHLORIDE 30 MG/1
30 CAPSULE, DELAYED RELEASE ORAL DAILY
Qty: 90 CAPSULE | Refills: 0 | Status: SHIPPED | OUTPATIENT
Start: 2022-09-01 | End: 2022-11-28

## 2022-09-01 NOTE — TELEPHONE ENCOUNTER
Noted; continue current dosing, please have her follow up with me in 2-3 months, or earlier as needed.

## 2022-09-19 ENCOUNTER — TELEPHONE (OUTPATIENT)
Dept: PHYSICAL THERAPY | Facility: HOSPITAL | Age: 77
End: 2022-09-19

## 2022-09-20 ENCOUNTER — OFFICE VISIT (OUTPATIENT)
Dept: PHYSICAL THERAPY | Age: 77
End: 2022-09-20
Attending: INTERNAL MEDICINE
Payer: MEDICARE

## 2022-09-20 DIAGNOSIS — H81.12 BPV (BENIGN POSITIONAL VERTIGO), LEFT: ICD-10-CM

## 2022-09-20 PROCEDURE — 97161 PT EVAL LOW COMPLEX 20 MIN: CPT

## 2022-09-20 PROCEDURE — 97530 THERAPEUTIC ACTIVITIES: CPT

## 2022-09-20 NOTE — PROGRESS NOTES
PHYSICAL THERAPY EVALUATION:   Referring Physician: Dr. Bailey Waddell  Diagnosis:  BPV (benign positional vertigo), left (F85.03)  Date of Onset: Oct 2021 Date of Service: 9/20/2022        PATIENT SUMMARY   Joi Durán is a 69year old y/o female who presents to therapy today with reports of dizziness  History of current condition: Pt states that she started with dizziness after her back surgery. She states that she was told by her dr told her to look on YouTube and do exercises. She has been doing exercises every morning. She reports dizziness when bending over, sit to stand, getting in/out bed. She denies any headaches and nausea. She was not prescribed any meds for symptoms. Symptoms with cough/sneeze or loud noise: no  Falls: no  Hx of migraines:  No  Hx of vision issue:  Yes: readers  Hx of hearing issues:   No    Dizziness: Current 0/10,  Best 0//10, Worst 10/10   Quality: room spinning  Frequency/duration: few seconds  Aggravates: Supine to/from sit, Sit to stand, Rolling, Bending over, Quick head movements, Darkness/eyes closed, Turning/direction changes and Fatigue  Relieves: Not moving    Headache:  Current 0/10    Cervical pain:  Current 0/10  Dizziness Handicap Inventory [facility]): 22/100    Current functional limitations include bending, getting in/out of bed, sit to stand  Social history:  Pt is retired. Prior level of function independent . Pt goals include \"for the dizziness to go away. \"  Are you being hurt, frightened, demeaned, or taken advantage of by anyone at your home or in your life? No        Have you recently had thoughts of hurting yourself? No    Have you tried to hurt yourself in the past?  No   Past medical history was reviewed with Tricebaltazar Boston. Significant findings include RA, lumbar fusion        ASSESSMENT:     Thomas Boston presents to physical therapy evaluation with primary c/o dizziness.  The results of the objective tests and measures show impaired DVA, negative positional testing. Functional deficits include but are not limited to getting in/out of bed, sit to stand, bending. Pt and PT discussed evaluation findings, pathology, POC and HEP. Pt voiced understanding and performs HEP correctly. Skilled Physical Therapy is medically necessary to address the above impairments and reach functional goals. Precautions:  None      OBJECTIVE:   Physical Exam:  Posture/Observation:  Fwd head                       Cervical spine ROM: Flex WFL, Ext WFL +symptoms-lightheaded, R ROT WFL+lightheaded symptoms, L ROT WFL +lightheaded symptoms  Adverse neuro signs with ROM: no    Oculomotor/Vestibular Exam:  Spontaneous Nystagmus: negative  Smooth Pursuit: Not Tested  Saccades: Not Tested  Gaze Evoked Nystagmus: Negative  Head Thrust: Negative  VOR screen:  Negative   VOR Cancellation: Negative   Convergence: Not Tested   Cover/Uncover: Not Tested   Cross Cover: Not Tested   Head Shaking Nystagmus: Negative   Dynamic Visual Acuity: Positive, 4 line degradation +symptoms    Positional Testing:  Edson-Hallpike: Right: negative, Left: negative  Roll Test [facility]): negative     Postural Control:  Not tested due to time constraints    Functional Mobility:  Functional Mobility/Gait:no AD, wide ANGEL        Today's Treatment and Response:   Pt education was provided on exam findings, treatment diagnosis, treatment plan, expectations, and prognosis. Pt was also provided recommendations for BPPV and UVH. Patient was instructed in and issued a HEP for: will provide next visit, provided pt education handouts    Charges: PT Josy Low Complexity, TAx1      Total Timed Treatment: 10 min     Total Treatment Time: 45 min     Based on clinical rationale and outcome measures, this evaluation involved Low Complexity decision making due to 1-2      PLAN OF CARE:    Goals to be met within (8 visits):  1. Pt will be I with HEP to improve therapy outcome and self manage symptoms.   2. Pt will be able to bend over without any symptoms. 3. Pt will be able to get in/out of bed and chair without any symptoms. Frequency / Duration: Patient will be seen for 1 x/week or a total of 8 visits over a 90 day period. Treatment will include: Home exercise program development and instruction, Patient/family education, Balance training, Canalith Repositioning Maneuver, Eye/head coordination exercises, Sensory organization training, Optokinetic stimulation, Strengthening, ROM, Exertion training; Gait Training; Manual Therapy;    Education or treatment limitation: None  Rehab Potential: excellent      Patient/Family was advised of these findings, precautions, and treatment options and has agreed to actively participate in planning and for this course of care. Thank you for your referral.  If you have any questions, please contact me at Dept: [phone]    Sincerely,  Electronically signed by therapist: Carin Amaro PT    Physician's certification required: Yes  I certify the need for these services furnished under this plan of treatment and while under my care.     X___________________________________________________ Date____________________    Certification From: 0/88/0215  To:12/19/2022

## 2022-09-21 ENCOUNTER — TELEPHONE (OUTPATIENT)
Dept: PHYSICAL THERAPY | Facility: HOSPITAL | Age: 77
End: 2022-09-21

## 2022-09-22 ENCOUNTER — APPOINTMENT (OUTPATIENT)
Dept: PHYSICAL THERAPY | Age: 77
End: 2022-09-22
Attending: INTERNAL MEDICINE
Payer: MEDICARE

## 2022-09-27 ENCOUNTER — OFFICE VISIT (OUTPATIENT)
Dept: AUDIOLOGY | Facility: CLINIC | Age: 77
End: 2022-09-27

## 2022-09-27 ENCOUNTER — OFFICE VISIT (OUTPATIENT)
Dept: PHYSICAL THERAPY | Age: 77
End: 2022-09-27
Attending: INTERNAL MEDICINE
Payer: MEDICARE

## 2022-09-27 ENCOUNTER — OFFICE VISIT (OUTPATIENT)
Dept: OTOLARYNGOLOGY | Facility: CLINIC | Age: 77
End: 2022-09-27

## 2022-09-27 VITALS — WEIGHT: 180 LBS | HEIGHT: 65 IN | BODY MASS INDEX: 29.99 KG/M2

## 2022-09-27 DIAGNOSIS — M26.609 TMJ (TEMPOROMANDIBULAR JOINT DISORDER): Primary | ICD-10-CM

## 2022-09-27 DIAGNOSIS — R42 DIZZINESS: Primary | ICD-10-CM

## 2022-09-27 DIAGNOSIS — H81.12 BPV (BENIGN POSITIONAL VERTIGO), LEFT: ICD-10-CM

## 2022-09-27 DIAGNOSIS — R42 DIZZINESS: ICD-10-CM

## 2022-09-27 PROCEDURE — 97530 THERAPEUTIC ACTIVITIES: CPT

## 2022-09-27 PROCEDURE — 97112 NEUROMUSCULAR REEDUCATION: CPT

## 2022-09-27 PROCEDURE — 1126F AMNT PAIN NOTED NONE PRSNT: CPT | Performed by: STUDENT IN AN ORGANIZED HEALTH CARE EDUCATION/TRAINING PROGRAM

## 2022-09-27 PROCEDURE — 92504 EAR MICROSCOPY EXAMINATION: CPT | Performed by: STUDENT IN AN ORGANIZED HEALTH CARE EDUCATION/TRAINING PROGRAM

## 2022-09-27 PROCEDURE — 92557 COMPREHENSIVE HEARING TEST: CPT | Performed by: AUDIOLOGIST

## 2022-09-27 PROCEDURE — 92567 TYMPANOMETRY: CPT | Performed by: AUDIOLOGIST

## 2022-09-27 PROCEDURE — 99213 OFFICE O/P EST LOW 20 MIN: CPT | Performed by: STUDENT IN AN ORGANIZED HEALTH CARE EDUCATION/TRAINING PROGRAM

## 2022-09-27 NOTE — PROGRESS NOTES
Dx:  BPV (benign positional vertigo), left (H81.12)       Insurance (Authorized # of Visits):  8   POC visits: 8   Authorizing Physician: Dr. Gertrude Morse MD visit:   Fall Risk: standard         Precautions:      DHI eval: 22/100          Subjective: Pt states that she has an appt later today with ENT. She reports that yesterday she went to gym and walked 2 miles without an issue. She reports no episodes of dizziness past week. Symptoms: 0/10  Objective: see flow sheet below   Romberg: EC 30 sec +severe sway  Romberg on Foam: EO 30 sec+ mod sway, EC 5 sec +LOB  Tandem Stance: R back EO 30 sec, +mod sway                              L back EO 30 sec, +mod sway    Delayed onset dizziness after testing    Assessment: Pt presents with impaired postural stability. Initiated gaze stabilization exercises and provided pt with HEP. Goals:   1. Pt will be I with HEP to improve therapy outcome and self manage symptoms. 2. Pt will be able to bend over without any symptoms. 3. Pt will be able to get in/out of bed and chair without any symptoms. Plan: assess response to treatment and progress as tolerated   Date: 9/27/2022  TX#: 2/8 Date:                 TX#: 3/ Date:                 TX#: 4/ Date:                 TX#: 5/ Date:    Tx#: 6/   There act:   Postural stability testing  Pt education on findings, balance, gaze stabilization exer       NMRE:  Seated VOR cx [id number] (+unsteady)                     HEP: 9/27/22- seated VOR cx; VOR x1     Charges: TAx1, NMREx1       Total Timed Treatment: 30 min  Total Treatment Time: 30 min

## 2022-10-05 ENCOUNTER — OFFICE VISIT (OUTPATIENT)
Dept: PHYSICAL THERAPY | Age: 77
End: 2022-10-05
Attending: INTERNAL MEDICINE
Payer: MEDICARE

## 2022-10-05 DIAGNOSIS — H81.12 BPV (BENIGN POSITIONAL VERTIGO), LEFT: ICD-10-CM

## 2022-10-05 PROCEDURE — 97112 NEUROMUSCULAR REEDUCATION: CPT

## 2022-10-05 NOTE — PROGRESS NOTES
Dx:  BPV (benign positional vertigo), left (H81.12)       Insurance (Authorized # of Visits):  8   POC visits: 8   Authorizing Physician: Dr. Luis Enrique Morse MD visit:   Fall Risk: standard         Precautions:      DHI eval: 22/100          Subjective: Pt states that she has been doing exercises and has felt ok. She states she has been walking up to 2.5 miles on TM without any problems. Symptoms: 0/10  Objective: see flow sheet below   Romberg: EC 30 sec +mod sway    Assessment: Able to progress gaze stabilization exercises to standing and initiated romberg for postural stability. Goals:   1. Pt will be I with HEP to improve therapy outcome and self manage symptoms. 2. Pt will be able to bend over without any symptoms. 3. Pt will be able to get in/out of bed and chair without any symptoms. Plan: assess response to treatment and progress as tolerated   Date: 9/27/2022  TX#: 2/8 Date: 10/5/22  TX#: 3/8 Date:                 TX#: 4/ Date:                 TX#: 5/ Date:    Tx#: 6/   There act:   Postural stability testing  Pt education on findings, balance, gaze stabilization exer NMRE:  Standing VOR cx side/side 07ymmf3  Standing VORx1 side/side; up/down 55hzgk1tm (symptoms up to 5/10)  Romberg EC 93mxtt1        NMRE:  Seated VOR cx 95lbae0 (+unsteady)                     HEP: 9/27/22- seated VOR cx; VOR x1   10/5/22- standing VOR cx, VORx1 up/down;side/side; romberg EC     Charges: NMREx2      Total Timed Treatment: 30 min  Total Treatment Time: 30 min

## 2022-10-13 ENCOUNTER — OFFICE VISIT (OUTPATIENT)
Dept: PHYSICAL THERAPY | Age: 77
End: 2022-10-13
Attending: INTERNAL MEDICINE
Payer: MEDICARE

## 2022-10-13 DIAGNOSIS — H81.12 BPV (BENIGN POSITIONAL VERTIGO), LEFT: ICD-10-CM

## 2022-10-13 PROCEDURE — 97112 NEUROMUSCULAR REEDUCATION: CPT

## 2022-10-13 NOTE — PROGRESS NOTES
Marie  Pt has attended 4 visits in Physical Therapy. Dx:  BPV (benign positional vertigo), left (H81.12)       Insurance (Authorized # of Visits):  8   POC visits: 8   Authorizing Physician: Dr. Kodi Morse MD visit:   Fall Risk: standard         Precautions:      DHI eval: 22/100          Subjective: Pt states that she has not had any episodes this past week of dizziness. She was able to walk on TM for over 3 miles and has not had any issues. Pt states that she has not had to \"furniture walk\" either. Symptoms: 0/10  Objective: see flow sheet below   Romberg: EC 30 sec +min sway(no symptoms)                Foam EC 30 +mod-min sway (no symptoms)  DVA: WFL, 2 line degradation, no symptoms    Assessment: Pt has attended 4 PT sessions for dizziness. Pt presents with no symptoms, WFL DVA, improved postural stability. Pt has been educated in [address] [place] [address]. Pt has met 3/3 LTGs. Discharge pt to [facility] at this time. Goals:   1. Pt will be I with HEP to improve therapy outcome and self manage symptoms.- MET  2. Pt will be able to bend over without any symptoms.- MET  3. Pt will be able to get in/out of bed and chair without any symptoms. -MET    Plan: discharge    Date: 9/27/2022  TX#: 2/8 Date: 10/5/22  TX#: 3/8 Date: 10/13/22          TX#: 4/8 Date:                 TX#: 5/ Date:    Tx#: 6/ There act:   Postural stability testing  Pt education on findings, balance, gaze stabilization exer NMRE:  Standing VOR cx side/side 42cxai8  Standing VORx1 side/side; up/down 83upto0lk (symptoms up to 5/10)  Romberg EC 60czln9   NMRE:  Romberg airex EC 67okmd2  Re-assessment  VORx1 checkerboard 21xhrc0 (no symptoms)     NMRE:  Seated VOR cx 14tlxa8 (+unsteady)                     HEP: 9/27/22- seated VOR cx; VOR x1   10/5/22- standing VOR cx, VORx1 up/down;side/side; romberg EC   10/13/22- romberg on pillow, VORx1 checkerboard    Charges: NMREx2      Total Timed Treatment: 30 min  Total Treatment Time: 30 min

## 2022-10-14 ENCOUNTER — PATIENT MESSAGE (OUTPATIENT)
Dept: RHEUMATOLOGY | Facility: CLINIC | Age: 77
End: 2022-10-14

## 2022-10-14 RX ORDER — MEDROXYPROGESTERONE ACETATE 150 MG/ML
INJECTION, SUSPENSION INTRAMUSCULAR
Qty: 4 EACH | Refills: 1 | Status: SHIPPED | OUTPATIENT
Start: 2022-10-14 | End: 2022-11-16

## 2022-10-14 NOTE — TELEPHONE ENCOUNTER
LOV: 3/29/22  No future appointments. LABS:  Component      Latest Ref Rng & Units 6/9/2022   WBC      4.0 - 11.0 x10(3) uL 6.3   RBC      3.80 - 5.30 x10(6)uL 5.17   Hemoglobin      12.0 - 16.0 g/dL 12.1   Hematocrit      35.0 - 48.0 % 39.1   MCV      80.0 - 100.0 fL 75.6 (L)   MCH      26.0 - 34.0 pg 23.4 (L)   MCHC      31.0 - 37.0 g/dL 30.9 (L)   RDW-SD      35.1 - 46.3 fL 39.3   RDW      11.0 - 15.0 % 14.6   Platelet Count      608.9 - 450.0 10(3)uL 269.0   Prelim Neutrophil Abs      1.50 - 7.70 x10 (3) uL 3.13   Neutrophils Absolute      1.50 - 7.70 x10(3) uL 3.13   Lymphocytes Absolute      1.00 - 4.00 x10(3) uL 2.20   Monocytes Absolute      0.10 - 1.00 x10(3) uL 0.77   Eosinophils Absolute      0.00 - 0.70 x10(3) uL 0.13   Basophils Absolute      0.00 - 0.20 x10(3) uL 0.04   Immature Granulocyte Absolute      0.00 - 1.00 x10(3) uL 0.02   Neutrophils %      % 49.8   Lymphocytes %      % 35.0   Monocytes %      % 12.2   Eosinophils %      % 2.1   Basophils %      % 0.6   Immature Granulocyte %      % 0.3   CREATININE      0.55 - 1.02 mg/dL 1.02   eGFR NON-AFR. AMERICAN      >=60 54 (L)   eGFR       >=60 62   SED RATE      0 - 30 mm/Hr 22   C-REACTIVE PROTEIN      <0.30 mg/dL <0.29   AST (SGOT)      15 - 37 U/L 28   Albumin      3.4 - 5.0 g/dL 3.8     ASSESSMENT/PLAN:   1. CCP and RF positive Ra (rheumatoid arthritis). Again generally controlled on Enbrel SureClick 50 mg weekly. Her RX was refilled. She will schedule back in 6 months. 2. Encounter for therapeutic drug monitoring. 3. H/O rotator cuff tendinitis. 4.  Borderline diabetes, high cholesterol. Diet. 5.  Status post lumbar spine surgery in the summer 2021. Persistent pain on the right side. Will add back meloxicam 7.5 mg daily with food.

## 2022-10-15 NOTE — TELEPHONE ENCOUNTER
From: Marianne Overton MD  To: Shemar Lacey  Sent: 10/14/2022 3:56 PM CDT  Subject: Enbrel refill. I just refilled your Enbrel prescription for 2 months. I hope that you are doing well with your arthritis. It is time for a follow-up appointment. Please schedule. Take care!

## 2022-10-20 ENCOUNTER — APPOINTMENT (OUTPATIENT)
Dept: PHYSICAL THERAPY | Age: 77
End: 2022-10-20
Attending: INTERNAL MEDICINE
Payer: MEDICARE

## 2022-10-25 ENCOUNTER — APPOINTMENT (OUTPATIENT)
Dept: PHYSICAL THERAPY | Age: 77
End: 2022-10-25
Attending: INTERNAL MEDICINE
Payer: MEDICARE

## 2022-11-01 ENCOUNTER — APPOINTMENT (OUTPATIENT)
Dept: PHYSICAL THERAPY | Age: 77
End: 2022-11-01
Attending: INTERNAL MEDICINE
Payer: MEDICARE

## 2022-11-10 ENCOUNTER — APPOINTMENT (OUTPATIENT)
Dept: PHYSICAL THERAPY | Age: 77
End: 2022-11-10
Attending: INTERNAL MEDICINE
Payer: MEDICARE

## 2022-11-16 ENCOUNTER — PATIENT MESSAGE (OUTPATIENT)
Dept: RHEUMATOLOGY | Facility: CLINIC | Age: 77
End: 2022-11-16

## 2022-11-16 ENCOUNTER — TELEPHONE (OUTPATIENT)
Dept: RHEUMATOLOGY | Facility: CLINIC | Age: 77
End: 2022-11-16

## 2022-11-16 RX ORDER — ETANERCEPT 50 MG/ML
SOLUTION SUBCUTANEOUS
Qty: 12 EACH | Refills: 0 | Status: SHIPPED | OUTPATIENT
Start: 2022-11-16

## 2022-11-16 NOTE — TELEPHONE ENCOUNTER
Brandee Hernandez from [employer] is calling to advise PCP the patient is requesting 3 month supply of the following medication    Enbrel    Please advise.     If any questions, please call [phone]

## 2022-11-16 NOTE — TELEPHONE ENCOUNTER
Please see pt's request below and advise. Last office visit 3/29/2022. No follow up appointment scheduled at this time. Discuss at follow up appointment? Component      Latest Ref Rng & Units 6/9/2022   WBC      4.0 - 11.0 x10(3) uL 6.3   RBC      3.80 - 5.30 x10(6)uL 5.17   Hemoglobin      12.0 - 16.0 g/dL 12.1   Hematocrit      35.0 - 48.0 % 39.1   MCV      80.0 - 100.0 fL 75.6 (L)   MCH      26.0 - 34.0 pg 23.4 (L)   MCHC      31.0 - 37.0 g/dL 30.9 (L)   RDW-SD      35.1 - 46.3 fL 39.3   RDW      11.0 - 15.0 % 14.6   Platelet Count      794.5 - 450.0 10(3)uL 269.0   Prelim Neutrophil Abs      1.50 - 7.70 x10 (3) uL 3.13   Neutrophils Absolute      1.50 - 7.70 x10(3) uL 3.13   Lymphocytes Absolute      1.00 - 4.00 x10(3) uL 2.20   Monocytes Absolute      0.10 - 1.00 x10(3) uL 0.77   Eosinophils Absolute      0.00 - 0.70 x10(3) uL 0.13   Basophils Absolute      0.00 - 0.20 x10(3) uL 0.04   Immature Granulocyte Absolute      0.00 - 1.00 x10(3) uL 0.02   Neutrophils %      % 49.8   Lymphocytes %      % 35.0   Monocytes %      % 12.2   Eosinophils %      % 2.1   Basophils %      % 0.6   Immature Granulocyte %      % 0.3   CREATININE      0.55 - 1.02 mg/dL 1.02   eGFR NON-AFR.  AMERICAN      >=60 54 (L)   eGFR       >=60 62   SED RATE      0 - 30 mm/Hr 22   C-REACTIVE PROTEIN      <0.30 mg/dL <0.29   AST (SGOT)      15 - 37 U/L 28   Albumin      3.4 - 5.0 g/dL 3.8

## 2022-11-17 ENCOUNTER — TELEPHONE (OUTPATIENT)
Dept: RHEUMATOLOGY | Facility: CLINIC | Age: 77
End: 2022-11-17

## 2022-11-17 ENCOUNTER — APPOINTMENT (OUTPATIENT)
Dept: PHYSICAL THERAPY | Age: 77
End: 2022-11-17
Attending: INTERNAL MEDICINE
Payer: MEDICARE

## 2022-11-17 NOTE — TELEPHONE ENCOUNTER
From: Eden Person  Sent: 11/16/2022 6:52 PM CST  To: María Elena Mast Clinical Staff  Subject: Dominick boykin. Thank You, I will schedule an appointment.

## 2022-11-28 ENCOUNTER — LAB ENCOUNTER (OUTPATIENT)
Dept: LAB | Facility: HOSPITAL | Age: 77
End: 2022-11-28
Attending: INTERNAL MEDICINE
Payer: MEDICARE

## 2022-11-28 DIAGNOSIS — Z51.81 THERAPEUTIC DRUG MONITORING: ICD-10-CM

## 2022-11-28 DIAGNOSIS — M05.79 SEROPOSITIVE RHEUMATOID ARTHRITIS OF MULTIPLE SITES (HCC): ICD-10-CM

## 2022-11-28 DIAGNOSIS — M53.3 PAIN OF RIGHT SACROILIAC JOINT: ICD-10-CM

## 2022-11-28 LAB
ALBUMIN SERPL-MCNC: 3.9 G/DL (ref 3.4–5)
AST SERPL-CCNC: 15 U/L (ref 15–37)
BASOPHILS # BLD AUTO: 0.04 X10(3) UL (ref 0–0.2)
BASOPHILS NFR BLD AUTO: 0.8 %
CREAT BLD-MCNC: 1.01 MG/DL
CRP SERPL-MCNC: <0.29 MG/DL (ref ?–0.3)
DEPRECATED RDW RBC AUTO: 39.4 FL (ref 35.1–46.3)
EOSINOPHIL # BLD AUTO: 0.1 X10(3) UL (ref 0–0.7)
EOSINOPHIL NFR BLD AUTO: 2.1 %
ERYTHROCYTE [DISTWIDTH] IN BLOOD BY AUTOMATED COUNT: 14.5 % (ref 11–15)
ERYTHROCYTE [SEDIMENTATION RATE] IN BLOOD: 67 MM/HR
GFR SERPLBLD BASED ON 1.73 SQ M-ARVRAT: 58 ML/MIN/1.73M2 (ref 60–?)
HCT VFR BLD AUTO: 39.8 %
HGB BLD-MCNC: 12.5 G/DL
IMM GRANULOCYTES # BLD AUTO: 0.01 X10(3) UL (ref 0–1)
IMM GRANULOCYTES NFR BLD: 0.2 %
LYMPHOCYTES # BLD AUTO: 1.86 X10(3) UL (ref 1–4)
LYMPHOCYTES NFR BLD AUTO: 39 %
MCH RBC QN AUTO: 24.1 PG (ref 26–34)
MCHC RBC AUTO-ENTMCNC: 31.4 G/DL (ref 31–37)
MCV RBC AUTO: 76.7 FL
MONOCYTES # BLD AUTO: 0.47 X10(3) UL (ref 0.1–1)
MONOCYTES NFR BLD AUTO: 9.9 %
NEUTROPHILS # BLD AUTO: 2.29 X10 (3) UL (ref 1.5–7.7)
NEUTROPHILS # BLD AUTO: 2.29 X10(3) UL (ref 1.5–7.7)
NEUTROPHILS NFR BLD AUTO: 48 %
PLATELET # BLD AUTO: 329 10(3)UL (ref 150–450)
RBC # BLD AUTO: 5.19 X10(6)UL
WBC # BLD AUTO: 4.8 X10(3) UL (ref 4–11)

## 2022-11-28 PROCEDURE — 84450 TRANSFERASE (AST) (SGOT): CPT

## 2022-11-28 PROCEDURE — 85025 COMPLETE CBC W/AUTO DIFF WBC: CPT

## 2022-11-28 PROCEDURE — 82565 ASSAY OF CREATININE: CPT

## 2022-11-28 PROCEDURE — 86140 C-REACTIVE PROTEIN: CPT

## 2022-11-28 PROCEDURE — 85652 RBC SED RATE AUTOMATED: CPT

## 2022-11-28 PROCEDURE — 82040 ASSAY OF SERUM ALBUMIN: CPT

## 2022-11-28 PROCEDURE — 36415 COLL VENOUS BLD VENIPUNCTURE: CPT

## 2022-11-28 RX ORDER — DULOXETIN HYDROCHLORIDE 30 MG/1
CAPSULE, DELAYED RELEASE ORAL
Qty: 90 CAPSULE | Refills: 0 | Status: SHIPPED | OUTPATIENT
Start: 2022-11-28

## 2022-12-03 NOTE — PROGRESS NOTES
Yasmeen Carbajal is a 60-year-old woman with CCP and RF positive rheumatoid arthritis, who I last saw in the office March 29th of 2022. Humira 40 mg didn't work, with severe arthritis in her ankles, knees, shoulders, wrists, and hands. She was switched back to Enbrel SureClick 50 mg once a week, and her inflammatory arthritis has remained much improved. She denies inflamed joints in the morning. She had low back surgery in the summer 2021. Her leg symptoms resolved. She continues to have severe pain in her right low back and pelvis area. It hurts with movement. It is not associated with eating, having a bowel movement, urinating, etc.    She has not had infections or medical problems. Methotrexate 10 mg weekly had not worked. Prednisone helps her. Labs were repeated November 28th of 2022. CBC normal.  Creatinine 1.01 (58). AST and albumin normal.  Sed rate high at 67 and C-reactive protein less than 0.29. Review of Systems   Constitutional: Negative for fever, chills, diaphoresis and fatigue. Respiratory: Negative for shortness of breath. Cardiovascular: Negative for chest pain. Gastrointestinal: Negative for abdominal pain. Skin: Negative for rash. Hematological: Negative for adenopathy. Allergies:No Known Allergies     PHYSICAL EXAM:   Blood pressure 142/80, pulse 80, resp. rate 18, height 5' 5\" (1.651 m), weight 174 lb 1.6 oz (79 kg). Constitutional: She is oriented to person, place, and time. She appears well-developed and well-nourished. HENT:   Head: Normocephalic. Eyes: Conjunctivae are normal.   Cardiovascular: Normal rate, regular rhythm and normal heart sounds. Pulmonary/Chest: Effort normal and breath sounds normal.   Abdominal: Soft. Bowel sounds are normal. She exhibits no mass. There is no tenderness. There is no rebound and no guarding. Musculoskeletal: She exhibits no edema. There is not synovitis across her wrists or hand MCP joints.  She can make tight fists.  Shoulder motion is good. Standing up is difficult because of pain in her right lower back and pelvic area. There is no tenderness in her flank. No localized tenderness to palpation. Hips move well. Lymphadenopathy:     She has no cervical adenopathy. Neurological: She is alert and oriented to person, place, and time. Skin: No rash noted. Psychiatric: She has a normal mood and affect. ASSESSMENT/PLAN:     1. CCP and RF positive Ra (rheumatoid arthritis). Controlled on Enbrel SureClick 50 mg weekly. She will schedule back in 6 months. 2. Encounter for therapeutic drug monitoring. 3. H/O rotator cuff tendinitis. 4.  Borderline diabetes, high cholesterol. Diet. 5.  Status post lumbar spine surgery in the summer 2021. Persistent pain on the right side. We will stop meloxicam since not helping. Plain x-rays will be done of her lumbar spine and pelvis. We will communicate with the results.

## 2022-12-06 ENCOUNTER — OFFICE VISIT (OUTPATIENT)
Dept: RHEUMATOLOGY | Facility: CLINIC | Age: 77
End: 2022-12-06
Payer: MEDICARE

## 2022-12-06 ENCOUNTER — HOSPITAL ENCOUNTER (OUTPATIENT)
Dept: GENERAL RADIOLOGY | Age: 77
Discharge: HOME | End: 2022-12-06
Attending: INTERNAL MEDICINE
Payer: MEDICARE

## 2022-12-06 VITALS
BODY MASS INDEX: 29.01 KG/M2 | HEART RATE: 80 BPM | WEIGHT: 174.13 LBS | DIASTOLIC BLOOD PRESSURE: 80 MMHG | SYSTOLIC BLOOD PRESSURE: 142 MMHG | HEIGHT: 65 IN | RESPIRATION RATE: 18 BRPM

## 2022-12-06 DIAGNOSIS — G89.29 CHRONIC RIGHT-SIDED LOW BACK PAIN WITHOUT SCIATICA: ICD-10-CM

## 2022-12-06 DIAGNOSIS — M54.50 CHRONIC RIGHT-SIDED LOW BACK PAIN WITHOUT SCIATICA: ICD-10-CM

## 2022-12-06 DIAGNOSIS — M05.79 SEROPOSITIVE RHEUMATOID ARTHRITIS OF MULTIPLE SITES (HCC): Primary | ICD-10-CM

## 2022-12-06 DIAGNOSIS — Z51.81 THERAPEUTIC DRUG MONITORING: ICD-10-CM

## 2022-12-06 PROCEDURE — 72170 X-RAY EXAM OF PELVIS: CPT | Performed by: INTERNAL MEDICINE

## 2022-12-06 PROCEDURE — 99214 OFFICE O/P EST MOD 30 MIN: CPT | Performed by: INTERNAL MEDICINE

## 2022-12-06 PROCEDURE — 72100 X-RAY EXAM L-S SPINE 2/3 VWS: CPT | Performed by: INTERNAL MEDICINE

## 2022-12-06 PROCEDURE — 1125F AMNT PAIN NOTED PAIN PRSNT: CPT | Performed by: INTERNAL MEDICINE

## 2023-02-06 ENCOUNTER — LAB ENCOUNTER (OUTPATIENT)
Dept: LAB | Facility: HOSPITAL | Age: 78
End: 2023-02-06
Attending: INTERNAL MEDICINE
Payer: MEDICARE

## 2023-02-06 DIAGNOSIS — M05.79 SEROPOSITIVE RHEUMATOID ARTHRITIS OF MULTIPLE SITES (HCC): ICD-10-CM

## 2023-02-06 DIAGNOSIS — Z51.81 THERAPEUTIC DRUG MONITORING: ICD-10-CM

## 2023-02-06 LAB
ALBUMIN SERPL-MCNC: 3.7 G/DL (ref 3.4–5)
AST SERPL-CCNC: 15 U/L (ref 15–37)
BASOPHILS # BLD AUTO: 0.02 X10(3) UL (ref 0–0.2)
BASOPHILS NFR BLD AUTO: 0.4 %
CREAT BLD-MCNC: 0.98 MG/DL
CRP SERPL-MCNC: <0.29 MG/DL (ref ?–0.3)
DEPRECATED RDW RBC AUTO: 40.4 FL (ref 35.1–46.3)
EOSINOPHIL # BLD AUTO: 0.11 X10(3) UL (ref 0–0.7)
EOSINOPHIL NFR BLD AUTO: 2 %
ERYTHROCYTE [DISTWIDTH] IN BLOOD BY AUTOMATED COUNT: 15 % (ref 11–15)
ERYTHROCYTE [SEDIMENTATION RATE] IN BLOOD: 51 MM/HR
GFR SERPLBLD BASED ON 1.73 SQ M-ARVRAT: 60 ML/MIN/1.73M2 (ref 60–?)
HCT VFR BLD AUTO: 37.1 %
HGB BLD-MCNC: 11.8 G/DL
IMM GRANULOCYTES # BLD AUTO: 0.02 X10(3) UL (ref 0–1)
IMM GRANULOCYTES NFR BLD: 0.4 %
LYMPHOCYTES # BLD AUTO: 1.87 X10(3) UL (ref 1–4)
LYMPHOCYTES NFR BLD AUTO: 34.2 %
MCH RBC QN AUTO: 23.7 PG (ref 26–34)
MCHC RBC AUTO-ENTMCNC: 31.8 G/DL (ref 31–37)
MCV RBC AUTO: 74.6 FL
MONOCYTES # BLD AUTO: 0.67 X10(3) UL (ref 0.1–1)
MONOCYTES NFR BLD AUTO: 12.3 %
NEUTROPHILS # BLD AUTO: 2.77 X10 (3) UL (ref 1.5–7.7)
NEUTROPHILS # BLD AUTO: 2.77 X10(3) UL (ref 1.5–7.7)
NEUTROPHILS NFR BLD AUTO: 50.7 %
PLATELET # BLD AUTO: 279 10(3)UL (ref 150–450)
RBC # BLD AUTO: 4.97 X10(6)UL
WBC # BLD AUTO: 5.5 X10(3) UL (ref 4–11)

## 2023-02-06 PROCEDURE — 85652 RBC SED RATE AUTOMATED: CPT

## 2023-02-06 PROCEDURE — 85025 COMPLETE CBC W/AUTO DIFF WBC: CPT

## 2023-02-06 PROCEDURE — 82040 ASSAY OF SERUM ALBUMIN: CPT

## 2023-02-06 PROCEDURE — 82565 ASSAY OF CREATININE: CPT

## 2023-02-06 PROCEDURE — 84450 TRANSFERASE (AST) (SGOT): CPT

## 2023-02-06 PROCEDURE — 86140 C-REACTIVE PROTEIN: CPT

## 2023-02-06 PROCEDURE — 36415 COLL VENOUS BLD VENIPUNCTURE: CPT

## 2023-02-10 ENCOUNTER — LAB ENCOUNTER (OUTPATIENT)
Dept: LAB | Facility: HOSPITAL | Age: 78
End: 2023-02-10
Attending: INTERNAL MEDICINE
Payer: MEDICARE

## 2023-02-10 ENCOUNTER — OFFICE VISIT (OUTPATIENT)
Dept: INTERNAL MEDICINE CLINIC | Facility: CLINIC | Age: 78
End: 2023-02-10
Payer: MEDICARE

## 2023-02-10 VITALS
OXYGEN SATURATION: 99 % | WEIGHT: 177 LBS | SYSTOLIC BLOOD PRESSURE: 122 MMHG | BODY MASS INDEX: 29.49 KG/M2 | HEIGHT: 65 IN | DIASTOLIC BLOOD PRESSURE: 70 MMHG | HEART RATE: 66 BPM

## 2023-02-10 DIAGNOSIS — R82.998 FOAMY URINE: ICD-10-CM

## 2023-02-10 DIAGNOSIS — D50.9 MICROCYTIC ANEMIA: Primary | ICD-10-CM

## 2023-02-10 DIAGNOSIS — R73.9 HYPERGLYCEMIA: ICD-10-CM

## 2023-02-10 DIAGNOSIS — M54.59 INTRACTABLE LOW BACK PAIN: ICD-10-CM

## 2023-02-10 DIAGNOSIS — D50.9 MICROCYTIC ANEMIA: ICD-10-CM

## 2023-02-10 LAB
ALBUMIN SERPL-MCNC: 3.8 G/DL (ref 3.4–5)
ALBUMIN/GLOB SERPL: 0.9 {RATIO} (ref 1–2)
ALP LIVER SERPL-CCNC: 83 U/L
ALT SERPL-CCNC: 25 U/L
ANION GAP SERPL CALC-SCNC: 7 MMOL/L (ref 0–18)
AST SERPL-CCNC: 18 U/L (ref 15–37)
BILIRUB SERPL-MCNC: 0.4 MG/DL (ref 0.1–2)
BILIRUB UR QL: NEGATIVE
BUN BLD-MCNC: 15 MG/DL (ref 7–18)
BUN/CREAT SERPL: 15.3 (ref 10–20)
CALCIUM BLD-MCNC: 9.5 MG/DL (ref 8.5–10.1)
CHLORIDE SERPL-SCNC: 106 MMOL/L (ref 98–112)
CO2 SERPL-SCNC: 26 MMOL/L (ref 21–32)
COLOR UR: YELLOW
CREAT BLD-MCNC: 0.98 MG/DL
DEPRECATED HBV CORE AB SER IA-ACNC: 23.9 NG/ML
EST. AVERAGE GLUCOSE BLD GHB EST-MCNC: 120 MG/DL (ref 68–126)
FASTING STATUS PATIENT QL REPORTED: NO
GFR SERPLBLD BASED ON 1.73 SQ M-ARVRAT: 60 ML/MIN/1.73M2 (ref 60–?)
GLOBULIN PLAS-MCNC: 4.1 G/DL (ref 2.8–4.4)
GLUCOSE BLD-MCNC: 85 MG/DL (ref 70–99)
GLUCOSE UR-MCNC: NEGATIVE MG/DL
HBA1C MFR BLD: 5.8 % (ref ?–5.7)
HGB UR QL STRIP.AUTO: NEGATIVE
IRON SATN MFR SERPL: 20 %
IRON SERPL-MCNC: 87 UG/DL
KETONES UR-MCNC: NEGATIVE MG/DL
NITRITE UR QL STRIP.AUTO: NEGATIVE
OSMOLALITY SERPL CALC.SUM OF ELEC: 288 MOSM/KG (ref 275–295)
PH UR: 5 [PH] (ref 5–8)
POTASSIUM SERPL-SCNC: 4.4 MMOL/L (ref 3.5–5.1)
PROT SERPL-MCNC: 7.9 G/DL (ref 6.4–8.2)
PROT UR-MCNC: NEGATIVE MG/DL
SODIUM SERPL-SCNC: 139 MMOL/L (ref 136–145)
SP GR UR STRIP: 1.02 (ref 1–1.03)
TIBC SERPL-MCNC: 425 UG/DL (ref 240–450)
TRANSFERRIN SERPL-MCNC: 285 MG/DL (ref 200–360)
UROBILINOGEN UR STRIP-ACNC: 0.2

## 2023-02-10 PROCEDURE — 83540 ASSAY OF IRON: CPT

## 2023-02-10 PROCEDURE — 99214 OFFICE O/P EST MOD 30 MIN: CPT | Performed by: INTERNAL MEDICINE

## 2023-02-10 PROCEDURE — 36415 COLL VENOUS BLD VENIPUNCTURE: CPT | Performed by: INTERNAL MEDICINE

## 2023-02-10 PROCEDURE — 81001 URINALYSIS AUTO W/SCOPE: CPT

## 2023-02-10 PROCEDURE — 84466 ASSAY OF TRANSFERRIN: CPT

## 2023-02-10 PROCEDURE — 80053 COMPREHEN METABOLIC PANEL: CPT | Performed by: INTERNAL MEDICINE

## 2023-02-10 PROCEDURE — 81015 MICROSCOPIC EXAM OF URINE: CPT

## 2023-02-10 PROCEDURE — 82728 ASSAY OF FERRITIN: CPT

## 2023-02-10 PROCEDURE — 83036 HEMOGLOBIN GLYCOSYLATED A1C: CPT | Performed by: INTERNAL MEDICINE

## 2023-02-10 RX ORDER — OMEPRAZOLE 40 MG/1
40 CAPSULE, DELAYED RELEASE ORAL DAILY
Qty: 30 CAPSULE | Refills: 2 | Status: SHIPPED | OUTPATIENT
Start: 2023-02-10 | End: 2023-02-10 | Stop reason: ALTCHOICE

## 2023-02-10 RX ORDER — LORAZEPAM 1 MG/1
1 TABLET ORAL
Qty: 30 TABLET | Refills: 1 | Status: SHIPPED | OUTPATIENT
Start: 2023-02-10

## 2023-02-25 ENCOUNTER — TELEPHONE (OUTPATIENT)
Dept: RHEUMATOLOGY | Facility: CLINIC | Age: 78
End: 2023-02-25

## 2023-02-25 NOTE — TELEPHONE ENCOUNTER
Unable to complete PA for Enbrel via [employer]. Called insurance at [phone], [place] Department is not open at this time. Can call [phone] and fax [phone], ID [id number]. Will try for PA later.

## 2023-02-28 NOTE — TELEPHONE ENCOUNTER
Spoke with the PA department at [phone] per representative, Mihaela Alberts, claim is rejecting for refill too soon. Cannot fill until 3/3/2023. When she does claim 90 days out, no PA required for Enbrel 50 mg Sureclick pen.

## 2023-03-09 ENCOUNTER — PATIENT MESSAGE (OUTPATIENT)
Dept: RHEUMATOLOGY | Facility: CLINIC | Age: 78
End: 2023-03-09

## 2023-03-09 RX ORDER — MEDROXYPROGESTERONE ACETATE 150 MG/ML
INJECTION, SUSPENSION INTRAMUSCULAR
Qty: 12 EACH | Refills: 1 | Status: SHIPPED | OUTPATIENT
Start: 2023-03-09

## 2023-03-09 NOTE — TELEPHONE ENCOUNTER
Please see request below. Script pended. LOV: 12/06/22  No future appointments.   Labs:   Component      Latest Ref Rng & Units 2/6/2023   WBC      4.0 - 11.0 x10(3) uL 5.5   RBC      3.80 - 5.30 x10(6)uL 4.97   Hemoglobin      12.0 - 16.0 g/dL 11.8 (L)   Hematocrit      35.0 - 48.0 % 37.1   MCV      80.0 - 100.0 fL 74.6 (L)   MCH      26.0 - 34.0 pg 23.7 (L)   MCHC      31.0 - 37.0 g/dL 31.8   RDW-SD      35.1 - 46.3 fL 40.4   RDW      11.0 - 15.0 % 15.0   Platelet Count      775.9 - 450.0 10(3)uL 279.0   Prelim Neutrophil Abs      1.50 - 7.70 x10 (3) uL 2.77   Neutrophils Absolute      1.50 - 7.70 x10(3) uL 2.77   Lymphocytes Absolute      1.00 - 4.00 x10(3) uL 1.87   Monocytes Absolute      0.10 - 1.00 x10(3) uL 0.67   Eosinophils Absolute      0.00 - 0.70 x10(3) uL 0.11   Basophils Absolute      0.00 - 0.20 x10(3) uL 0.02   Immature Granulocyte Absolute      0.00 - 1.00 x10(3) uL 0.02   Neutrophils %      % 50.7   Lymphocytes %      % 34.2   Monocytes %      % 12.3   Eosinophils %      % 2.0   Basophils %      % 0.4   Immature Granulocyte %      % 0.4   CREATININE      0.55 - 1.02 mg/dL 0.98   eGFR-Cr      >=60 mL/min/1.73m2 60   SED RATE      0 - 30 mm/Hr 51 (H)   C-REACTIVE PROTEIN      <0.30 mg/dL <0.29   AST (SGOT)      15 - 37 U/L 15   Albumin      3.4 - 5.0 g/dL 3.7

## 2023-03-09 NOTE — TELEPHONE ENCOUNTER
From: Serge Melgoza  To: Shaun Miller MD  Sent: 3/9/2023 11:28 AM CST  Subject: Enbrel prescription    Good afternoon Dr Autumn Washington, could you please rewrite [facility] the cost is much cheaper.

## 2023-03-13 ENCOUNTER — TELEPHONE (OUTPATIENT)
Dept: INTERNAL MEDICINE CLINIC | Facility: CLINIC | Age: 78
End: 2023-03-13

## 2023-03-13 NOTE — TELEPHONE ENCOUNTER
L/m to pt due for medicare physical and to please give us a call back to schedule physical.       Sent letter

## 2023-04-04 ENCOUNTER — OFFICE VISIT (OUTPATIENT)
Dept: INTERNAL MEDICINE CLINIC | Facility: CLINIC | Age: 78
End: 2023-04-04
Payer: MEDICARE

## 2023-04-04 ENCOUNTER — HOSPITAL ENCOUNTER (OUTPATIENT)
Dept: GENERAL RADIOLOGY | Facility: HOSPITAL | Age: 78
Discharge: HOME | End: 2023-04-04
Attending: INTERNAL MEDICINE
Payer: MEDICARE

## 2023-04-04 VITALS
DIASTOLIC BLOOD PRESSURE: 84 MMHG | TEMPERATURE: 97 F | OXYGEN SATURATION: 98 % | WEIGHT: 175 LBS | HEART RATE: 91 BPM | SYSTOLIC BLOOD PRESSURE: 138 MMHG | BODY MASS INDEX: 29 KG/M2

## 2023-04-04 DIAGNOSIS — M25.511 ACUTE PAIN OF RIGHT SHOULDER: Primary | ICD-10-CM

## 2023-04-04 DIAGNOSIS — R03.0 ELEVATED BLOOD PRESSURE READING WITHOUT DIAGNOSIS OF HYPERTENSION: ICD-10-CM

## 2023-04-04 DIAGNOSIS — M75.21 BICEPS TENDINITIS OF RIGHT UPPER EXTREMITY: ICD-10-CM

## 2023-04-04 DIAGNOSIS — M25.511 ACUTE PAIN OF RIGHT SHOULDER: ICD-10-CM

## 2023-04-04 PROCEDURE — 73030 X-RAY EXAM OF SHOULDER: CPT | Performed by: INTERNAL MEDICINE

## 2023-04-04 PROCEDURE — 99214 OFFICE O/P EST MOD 30 MIN: CPT | Performed by: INTERNAL MEDICINE

## 2023-04-04 RX ORDER — PREDNISONE 20 MG/1
40 TABLET ORAL DAILY
Qty: 14 TABLET | Refills: 0 | Status: SHIPPED | OUTPATIENT
Start: 2023-04-04 | End: 2023-04-11

## 2023-05-03 ENCOUNTER — LAB ENCOUNTER (OUTPATIENT)
Dept: LAB | Facility: HOSPITAL | Age: 78
End: 2023-05-03
Attending: INTERNAL MEDICINE
Payer: MEDICARE

## 2023-05-03 ENCOUNTER — TELEPHONE (OUTPATIENT)
Dept: RHEUMATOLOGY | Facility: CLINIC | Age: 78
End: 2023-05-03

## 2023-05-03 DIAGNOSIS — Z51.81 ENCOUNTER FOR THERAPEUTIC DRUG MONITORING: ICD-10-CM

## 2023-05-03 DIAGNOSIS — M05.79 SEROPOSITIVE RHEUMATOID ARTHRITIS OF MULTIPLE SITES (HCC): ICD-10-CM

## 2023-05-03 DIAGNOSIS — M05.79 SEROPOSITIVE RHEUMATOID ARTHRITIS OF MULTIPLE SITES (HCC): Primary | ICD-10-CM

## 2023-05-03 LAB
ALBUMIN SERPL-MCNC: 3.6 G/DL (ref 3.4–5)
AST SERPL-CCNC: 31 U/L (ref 15–37)
BASOPHILS # BLD AUTO: 0.03 X10(3) UL (ref 0–0.2)
BASOPHILS NFR BLD AUTO: 0.4 %
CREAT BLD-MCNC: 1.09 MG/DL
CRP SERPL-MCNC: 1.94 MG/DL (ref ?–0.3)
DEPRECATED RDW RBC AUTO: 40.8 FL (ref 35.1–46.3)
EOSINOPHIL # BLD AUTO: 0.22 X10(3) UL (ref 0–0.7)
EOSINOPHIL NFR BLD AUTO: 3.2 %
ERYTHROCYTE [DISTWIDTH] IN BLOOD BY AUTOMATED COUNT: 15 % (ref 11–15)
ERYTHROCYTE [SEDIMENTATION RATE] IN BLOOD: 81 MM/HR
GFR SERPLBLD BASED ON 1.73 SQ M-ARVRAT: 53 ML/MIN/1.73M2 (ref 60–?)
HCT VFR BLD AUTO: 37.1 %
HGB BLD-MCNC: 11.5 G/DL
IMM GRANULOCYTES # BLD AUTO: 0.02 X10(3) UL (ref 0–1)
IMM GRANULOCYTES NFR BLD: 0.3 %
LYMPHOCYTES # BLD AUTO: 1.46 X10(3) UL (ref 1–4)
LYMPHOCYTES NFR BLD AUTO: 21 %
MCH RBC QN AUTO: 23.3 PG (ref 26–34)
MCHC RBC AUTO-ENTMCNC: 31 G/DL (ref 31–37)
MCV RBC AUTO: 75.3 FL
MONOCYTES # BLD AUTO: 0.76 X10(3) UL (ref 0.1–1)
MONOCYTES NFR BLD AUTO: 10.9 %
NEUTROPHILS # BLD AUTO: 4.47 X10 (3) UL (ref 1.5–7.7)
NEUTROPHILS # BLD AUTO: 4.47 X10(3) UL (ref 1.5–7.7)
NEUTROPHILS NFR BLD AUTO: 64.2 %
PLATELET # BLD AUTO: 265 10(3)UL (ref 150–450)
RBC # BLD AUTO: 4.93 X10(6)UL
WBC # BLD AUTO: 7 X10(3) UL (ref 4–11)

## 2023-05-03 PROCEDURE — 84450 TRANSFERASE (AST) (SGOT): CPT

## 2023-05-03 PROCEDURE — 86140 C-REACTIVE PROTEIN: CPT

## 2023-05-03 PROCEDURE — 82040 ASSAY OF SERUM ALBUMIN: CPT

## 2023-05-03 PROCEDURE — 85652 RBC SED RATE AUTOMATED: CPT

## 2023-05-03 PROCEDURE — 36415 COLL VENOUS BLD VENIPUNCTURE: CPT

## 2023-05-03 PROCEDURE — 82565 ASSAY OF CREATININE: CPT

## 2023-05-03 PROCEDURE — 85025 COMPLETE CBC W/AUTO DIFF WBC: CPT

## 2023-05-03 NOTE — TELEPHONE ENCOUNTER
Patient is calling and states that she is at the lab now to complete her blood test but there are no active orders on her chart. She is scheduled for an appt on 5/10/2023 with .

## 2023-05-04 ENCOUNTER — APPOINTMENT (OUTPATIENT)
Dept: ALLERGY | Age: 78
End: 2023-05-04

## 2023-05-04 NOTE — PROGRESS NOTES
Ale Douglas is a 69-year-old woman with CCP and RF positive rheumatoid arthritis, who I last saw in the office 12/6/2022. Humira 40 mg didn't work, with severe arthritis in her ankles, knees, shoulders, wrists, and hands. She was switched back to Enbrel SureClick 50 mg once a week, and her inflammatory arthritis came under control. She had low back surgery in the summer 2021. Her leg symptoms resolved. She continues to have low back pain. She was doing okay until over a week ago when she flared severely in her right shoulder. Dr. Ricky Dan or her primary care physician did a cortisone injection which did not help. The shoulder was x-rayed 4/4/2023 showing mild glenohumeral and AC OA and changes consistent with calcific tendinitis of her supra spinatus tendon. She also took a week of steroids. It is no better. Motion is limited and painful. She also has diffuse inflammation across her foot MTP joints, hand MCP joints, wrists, etc.  She cannot sleep. She is very fatigued. She denies recent infections or new medical problems. Methotrexate 10 mg weekly had not worked. Prednisone helps her. Labs were repeated 5/3/2023. CBC was normal except hemoglobin of 11.5. Creatinine was 1.09 (53) which is usually normal.  AST and albumin normal.  Sed rate is up to 81 and C-reactive protein up to 1.94. Review of Systems   Constitutional: Negative for fever, chills, diaphoresis and fatigue. Respiratory: Negative for shortness of breath. Cardiovascular: Negative for chest pain. Gastrointestinal: Negative for abdominal pain. Skin: Negative for rash. Hematological: Negative for adenopathy. Allergies:No Known Allergies     PHYSICAL EXAM:   Blood pressure 124/79, pulse 65, height 5' 5\" (1.651 m). Constitutional: She is oriented to person, place, and time. She appears well-developed and well-nourished. HENT:   Head: Normocephalic.    Eyes: Conjunctivae are normal.   Cardiovascular: Normal rate, regular rhythm and normal heart sounds. Pulmonary/Chest: Effort normal and breath sounds normal.   Abdominal: Soft. Bowel sounds are normal. She exhibits no mass. There is no tenderness. There is no rebound and no guarding. Musculoskeletal: She exhibits no edema. Right shoulder motion is very limited and painful. Left shoulder motion is also limited. There is active synovitis across her wrist and hand MCP joints.  strength is weak. There is synovitis in her ankles. The balls of her feet are tender. Effusions in her knees. Lymphadenopathy:     She has no cervical adenopathy. Neurological: She is alert and oriented to person, place, and time. Skin: No rash noted. Psychiatric: She has a normal mood and affect. ASSESSMENT/PLAN:     1. CCP and RF positive Ra (rheumatoid arthritis). Flaring diffusely for the last week, on Enbrel SureClick 50 mg weekly. She will take a 12-day prednisone 'burst', from 30 mg a day to off. If this brings her RA under control, she will follow-up in rheumatology in 3 months. If not, we will discuss her long-term drug regimen. 2. Encounter for therapeutic drug monitoring. 3. H/O rotator cuff tendinitis. Right shoulder x-rays support calcific tendinitis. Prednisone should not help. If no better, can come back in for another injection. Referred to physical therapy. 4.  Borderline diabetes, high cholesterol. Diet. 5.  Status post lumbar spine surgery in the summer 2021.

## 2023-05-10 ENCOUNTER — OFFICE VISIT (OUTPATIENT)
Dept: RHEUMATOLOGY | Facility: CLINIC | Age: 78
End: 2023-05-10

## 2023-05-10 VITALS
HEIGHT: 65 IN | BODY MASS INDEX: 29 KG/M2 | HEART RATE: 65 BPM | DIASTOLIC BLOOD PRESSURE: 79 MMHG | SYSTOLIC BLOOD PRESSURE: 124 MMHG

## 2023-05-10 DIAGNOSIS — Z51.81 ENCOUNTER FOR THERAPEUTIC DRUG MONITORING: ICD-10-CM

## 2023-05-10 DIAGNOSIS — M75.31 CALCIFIC TENDONITIS OF RIGHT SHOULDER: ICD-10-CM

## 2023-05-10 DIAGNOSIS — M05.79 SEROPOSITIVE RHEUMATOID ARTHRITIS OF MULTIPLE SITES (HCC): Primary | ICD-10-CM

## 2023-05-10 PROCEDURE — 99214 OFFICE O/P EST MOD 30 MIN: CPT | Performed by: INTERNAL MEDICINE

## 2023-05-10 PROCEDURE — 1125F AMNT PAIN NOTED PAIN PRSNT: CPT | Performed by: INTERNAL MEDICINE

## 2023-05-10 RX ORDER — PREDNISONE 5 MG/1
TABLET ORAL
Qty: 42 TABLET | Refills: 0 | Status: SHIPPED | OUTPATIENT
Start: 2023-05-10

## 2023-05-26 ENCOUNTER — PATIENT MESSAGE (OUTPATIENT)
Dept: RHEUMATOLOGY | Facility: CLINIC | Age: 78
End: 2023-05-26

## 2023-05-26 DIAGNOSIS — M77.8 TENDINITIS OF SHOULDER, UNSPECIFIED LATERALITY: Primary | ICD-10-CM

## 2023-05-26 NOTE — TELEPHONE ENCOUNTER
From: Chance Kelly  To: Forest Boas, MD  Sent: 5/26/2023 12:39 PM CDT  Subject: Physical Therapy     Good afternoon Dr Raymond Brewer, since I am starting physical therapy on my right shoulder could I please have the left shoulder and arm included. I am experiencing the same type of pain.

## 2023-06-13 ENCOUNTER — TELEPHONE (OUTPATIENT)
Dept: PHYSICAL THERAPY | Facility: HOSPITAL | Age: 78
End: 2023-06-13

## 2023-06-14 ENCOUNTER — OFFICE VISIT (OUTPATIENT)
Dept: PHYSICAL THERAPY | Facility: HOSPITAL | Age: 78
End: 2023-06-14
Attending: INTERNAL MEDICINE
Payer: MEDICARE

## 2023-06-14 DIAGNOSIS — M75.31 CALCIFIC TENDONITIS OF RIGHT SHOULDER: ICD-10-CM

## 2023-06-14 PROCEDURE — 97161 PT EVAL LOW COMPLEX 20 MIN: CPT

## 2023-06-14 PROCEDURE — 97110 THERAPEUTIC EXERCISES: CPT

## 2023-06-16 ENCOUNTER — OFFICE VISIT (OUTPATIENT)
Dept: RHEUMATOLOGY | Facility: CLINIC | Age: 78
End: 2023-06-16

## 2023-06-16 VITALS
WEIGHT: 180 LBS | SYSTOLIC BLOOD PRESSURE: 161 MMHG | BODY MASS INDEX: 29.99 KG/M2 | HEART RATE: 89 BPM | DIASTOLIC BLOOD PRESSURE: 86 MMHG | HEIGHT: 65 IN

## 2023-06-16 DIAGNOSIS — M25.512 CHRONIC LEFT SHOULDER PAIN: ICD-10-CM

## 2023-06-16 DIAGNOSIS — M05.79 SEROPOSITIVE RHEUMATOID ARTHRITIS OF MULTIPLE SITES (HCC): Primary | ICD-10-CM

## 2023-06-16 DIAGNOSIS — G89.29 CHRONIC LEFT SHOULDER PAIN: ICD-10-CM

## 2023-06-16 PROCEDURE — 99213 OFFICE O/P EST LOW 20 MIN: CPT | Performed by: INTERNAL MEDICINE

## 2023-06-16 PROCEDURE — 20610 DRAIN/INJ JOINT/BURSA W/O US: CPT | Performed by: INTERNAL MEDICINE

## 2023-06-16 PROCEDURE — 1125F AMNT PAIN NOTED PAIN PRSNT: CPT | Performed by: INTERNAL MEDICINE

## 2023-06-16 RX ORDER — LEFLUNOMIDE 20 MG/1
TABLET ORAL
Qty: 30 TABLET | Refills: 2 | Status: SHIPPED | OUTPATIENT
Start: 2023-06-16

## 2023-06-16 RX ORDER — PREDNISONE 5 MG/1
TABLET ORAL
Qty: 100 TABLET | Refills: 0 | Status: SHIPPED | OUTPATIENT
Start: 2023-06-16

## 2023-06-16 RX ORDER — OMEPRAZOLE 40 MG/1
40 CAPSULE, DELAYED RELEASE ORAL DAILY
COMMUNITY
Start: 2023-02-10

## 2023-06-16 RX ORDER — METHYLPREDNISOLONE ACETATE 40 MG/ML
40 INJECTION, SUSPENSION INTRA-ARTICULAR; INTRALESIONAL; INTRAMUSCULAR; SOFT TISSUE ONCE
Status: COMPLETED | OUTPATIENT
Start: 2023-06-16 | End: 2023-06-16

## 2023-06-16 NOTE — PROGRESS NOTES
Bret Hansen is a 27-year-old woman with CCP and RF positive rheumatoid arthritis, who I last saw 5/10/2023. Humira 40 mg didn't work, with severe arthritis in her ankles, knees, shoulders, wrists, and hands. She was switched back to Enbrel SureClick 50 mg once a week, and her inflammatory arthritis came under control for awhile. She had low back surgery in the summer 2021. Her leg symptoms resolved. She continues to have low back pain. She did okay until early May of 2023 when she flared severely in her right shoulder. Dr. Magen Rosenthal, her primary care physician, did a cortisone injection which did not help. The shoulder was x-rayed 4/4/2023 showing mild glenohumeral and AC OA and changes consistent with calcific tendinitis of her supraspinatus tendon. She also took a week of steroids. At her LOV 5/10/2023, her right shoulder motion is limited and painful. She  had diffuse inflammation across her foot MTP joints, hand MCP joints, wrists, etc.  She could not sleep and was very fatigued. She took a 12-day prednisone burst which helped a lot, but its all come back. This morning she will awoke with severe left shoulder pain. She cannot move it. Her left hand is swollen. Her neck is painful. She has started physical therapy. She denies recent infections or new medical problems. Methotrexate 10 mg weekly had not worked. Labs were last done 5/3/2023. CBC was normal except hemoglobin of 11.5. Creatinine was 1.09 (53) which is usually normal.  AST and albumin normal.  Sed rate is up to 81 and C-reactive protein up to 1.94. Review of Systems   Constitutional: Negative for fever, chills, diaphoresis and fatigue. Respiratory: Negative for shortness of breath. Cardiovascular: Negative for chest pain. Gastrointestinal: Negative for abdominal pain. Skin: Negative for rash. Hematological: Negative for adenopathy.      Allergies:No Known Allergies     PHYSICAL EXAM:   Pleasant woman in obvious pain. Blood pressure (!) 161/86, pulse 89, height 5' 5\" (1.651 m), weight 180 lb (81.6 kg). Constitutional: She is oriented to person, place, and time. She appears well-developed and well-nourished. HENT:   Head: Normocephalic. Eyes: Conjunctivae are normal.   Cardiovascular: Normal rate, regular rhythm and normal heart sounds. Pulmonary/Chest: Effort normal and breath sounds normal.   Abdominal: Soft. Bowel sounds are normal. She exhibits no mass. There is no tenderness. There is no rebound and no guarding. Musculoskeletal: She exhibits no edema. Bilateral shoulder motion is limited and painful, left much worse than right. There is active synovitis across her wrist and hand MCP joints.  strength is weak. There is synovitis in her ankles. The balls of her feet are tender. Effusions in her knees. Lymphadenopathy:     She has no cervical adenopathy. Neurological: She is alert and oriented to person, place, and time. Skin: No rash noted. Psychiatric: She has a normal mood and affect. ASSESSMENT/PLAN:     1. CCP and RF positive Ra (rheumatoid arthritis). Flaring diffusely. Left shoulder especially bad. After informed consent was obtained, her left shoulder was injected into the subacromial area with 40 mg of Depo-Medrol and 1 cc 1% lidocaine. It was tolerated well. Leane Neida She will take another prednisone 'burst', but stay on 5 mg a day. In addition to her Enbrel, she will start leflunomide 20 mg daily. It should 'kick in' over the next 6 to 8 weeks. She will follow-up in rheumatology in 2 months. 2. Encounter for therapeutic drug monitoring. 3. H/O rotator cuff tendinitis. Right shoulder x-rays support calcific tendinitis. She will continue physical therapy. 4.  Borderline diabetes, high cholesterol. Diet. 5.  Status post lumbar spine surgery in the summer 2021.

## 2023-06-16 NOTE — PROCEDURES
Joint Injection  Pre-procedure care:  Consent was obtained, Procedure/risks were explained, Questions were answered, Patient was prepped and draped in the usual sterile fashion, Correct side and site confirmed and Correct patient identified  Seropositive rheumatoid arthritis of multiple sites (hcc)  (primary encounter diagnosis)  Chronic left shoulder pain    Injection Detail:  Procedure:  Site 1:  arthrocentesis major joint. Location: Left shoulder  subacromial space. Method:  injection    Site Prepped:  Yes  Technique:  aseptic technique with povidone-iodine and alcohol    Anesthetic:  ethyl chloride    Milliliter(s):  n/a    Needle gauge:  22 gauge    Placement confirmed by:  manual palpation    Fluid:  N/A    Milliliter(s) fluid withdrawn:  n/a    Medication:  methylprednisolone acetate  Dose:  40 mg    Sterile dressing:  Pressure - Yes  Adhesive - Yes    Patient response:  The patient did tolerate the procedure well. Improvement by site:  Site 1: 10% improvement    Plan:  Appropriate post injection instructions given.

## 2023-06-21 ENCOUNTER — OFFICE VISIT (OUTPATIENT)
Dept: PHYSICAL THERAPY | Facility: HOSPITAL | Age: 78
End: 2023-06-21
Attending: INTERNAL MEDICINE
Payer: MEDICARE

## 2023-06-21 PROCEDURE — 97110 THERAPEUTIC EXERCISES: CPT

## 2023-06-21 PROCEDURE — 97140 MANUAL THERAPY 1/> REGIONS: CPT

## 2023-06-27 ENCOUNTER — APPOINTMENT (OUTPATIENT)
Dept: PHYSICAL THERAPY | Facility: HOSPITAL | Age: 78
End: 2023-06-27
Attending: INTERNAL MEDICINE
Payer: MEDICARE

## 2023-06-29 ENCOUNTER — APPOINTMENT (OUTPATIENT)
Dept: PHYSICAL THERAPY | Facility: HOSPITAL | Age: 78
End: 2023-06-29
Attending: INTERNAL MEDICINE
Payer: MEDICARE

## 2023-07-03 ENCOUNTER — TELEPHONE (OUTPATIENT)
Dept: PHYSICAL THERAPY | Facility: HOSPITAL | Age: 78
End: 2023-07-03

## 2023-07-03 ENCOUNTER — APPOINTMENT (OUTPATIENT)
Dept: PHYSICAL THERAPY | Facility: HOSPITAL | Age: 78
End: 2023-07-03
Attending: INTERNAL MEDICINE
Payer: MEDICARE

## 2023-07-05 ENCOUNTER — OFFICE VISIT (OUTPATIENT)
Dept: PHYSICAL THERAPY | Facility: HOSPITAL | Age: 78
End: 2023-07-05
Attending: INTERNAL MEDICINE
Payer: MEDICARE

## 2023-07-05 PROCEDURE — 97140 MANUAL THERAPY 1/> REGIONS: CPT

## 2023-07-05 PROCEDURE — 97110 THERAPEUTIC EXERCISES: CPT

## 2023-07-05 NOTE — PROGRESS NOTES
Diagnosis:   Tendinitis of shoulder, unspecified laterality (M77.8)   Calcific tendonitis of right shoulder (M75.31)           Referring Provider: Guicho Womack    Date of Evaluation:    6/14/2023    Precautions:  RA, lumbar surgery (laminectomy/fusion 2021) Next MD visit:   none scheduled  Date of Surgery: n/a     Insurance Primary/Secondary: MEDICARE / Ronny Rodriguezk     # Auth Visits: na            Subjective: Returning from vacation. Felt well on trip; no pain in shoulders. Pain: 0/10 (R shoulder)   0/10 (L shoulder)      Objective:     Taken from IE:  Observation/Posture: forward shoulders B, ant translation L humeral head (L>R)  Palpation: TTP RC insertion L, no TTP LHBP  Sensation: intact light touch BUE  Cervical Screen: -    Cervical AROM:  Rotation: R 43 deg; L 35 deg - \"stiffness\"   Flexion: 30* deg - L pain  Extension: 40 deg  Side Bend: R 15 deg *L neck, L 12 deg    AROM (PROM): (* denotes performed with pain)  Shoulder  Elbow   Flexion: R 135* (165); L 35* (able to get to 85 deg after several repetitions)* (90)*  Abduction: R 140* (150); L 30* (sharp pain ant) (90)  ER: R occiput (90); L 0 (40)  IR: R L3 (45); L L5 (40) WFL    Supination/prontation painful L shoulder but full ROM     Accessory motion: Hypomobile L GHJ   Normal mobility R GHJ    Flexibility: severe tightness L UT   Mod tightness B pecs    Strength/MMT: (* denotes performed with pain)  Shoulder Elbow Scapular   Flexion: R 5-/5; L 3-/5*  Abduction: R 4/5*; L 3-/5*  ER: R 5/5; L 5/5  IR: R 5/5; L 5/5  Supraspinatus: 4+/5 Flexion: R 5/5; L 4+/5*  Extension: R 5/5; L 5-/5*   Scap: unable to test d/t pain             Assessment: Good movement today, some stiffness with first repetitions that then decreases/resolves after several reps. Continue with regular exercise routine. Patient demonstrates good understanding.      Goals:   Goals: (to be met in 10 visits)   Pt will report improved ability to sleep without waking due to shoulder pain   Pt will improve shoulder flexion AROM to be able to reach into overhead cabinets without pain or restriction   Pt will improve shoulder abduction AROM to improve ability to don deodorant, don/doff shirts, and wash hair   Pt will increase shoulder AROM ER to brush her hair  Pt will improve shoulder strength throughout to 4/5 to improve function with reaching and carrying  Pt will be able to sleep without waking due to shoulder pain   Pt will be independent and compliant with comprehensive HEP to maintain progress achieved in PT       Plan: Patient will be seen for 2 x/week or a total of 10 visits over a 90 day period. Treatment will include: Manual Therapy, Neuromuscular Re-education, Therapeutic Activities, Therapeutic Exercise, Home Exercise Program instruction and modalties prn    Date: 6/21/2023  TX#: 2/10 Date: 7/5/23                TX#: 3/10 Date:                 TX#: 4/ Date:                 TX#: 5/ Date:    Tx#: 6/   MT: 8 mins  - gentle GH L mobs Gr II  - gentle STM L shoulder MT: 10 mins  - -gentle GH B mobs Gr II- III  - gentle STM B shoulder      TE: 30 mins  - Pulleys, x4'  - low pec stretch doorway, x10  - doorway wall slide flex, x10 L  - standing row, YTB, 2x10 B  - standing single arm GH ext, YTB, x10 B  - supine chest press 1# alessio, x10  - supine can flexion 1#, x10  - review HEP TE: 28 mins  - Pulleys, x3'  - UBE, L1.0, x4' forw/2' retro  - standing rows, YTB, x20 B  - standing GHJ ext, YTB, x20 B  - standing ER single arm, YTB, x20 B  - AROM flexion, x10 B (seated)  - AROM abd, x3 B (seated)  - supine chest press, 2#, x20  - supine cane flex overhead, 2# alessio, x10                      HEP: table slides (flex and abd), scapular retraction - pt declines print out  6/21/23: print out: wall slides, scap retraction, rows (YTB)  Charges: 1MT, 2TE       Total Timed Treatment: 38 min  Total Treatment Time: 38 min

## 2023-07-07 ENCOUNTER — OFFICE VISIT (OUTPATIENT)
Dept: OTOLARYNGOLOGY | Facility: CLINIC | Age: 78
End: 2023-07-07

## 2023-07-07 VITALS — WEIGHT: 180 LBS | BODY MASS INDEX: 30 KG/M2

## 2023-07-07 DIAGNOSIS — R06.83 SNORING: Primary | ICD-10-CM

## 2023-07-07 DIAGNOSIS — K21.9 LPRD (LARYNGOPHARYNGEAL REFLUX DISEASE): ICD-10-CM

## 2023-07-07 NOTE — PROGRESS NOTES
NEW PATIENT PROGRESS NOTE  OTOLOGY/OTOLARYNGOLOGY    REF MD:  Colette Sanchez MD  [facility],[address] [facility]    PCP: Eladio Rogers MD    CHIEF COMPLAINT:  Patient presents with:  Cough: Persistent cough, postnasal drip x 3 months      HISTORY OF PRESENT ILLNESS: Malissa Crenshaw is a 69year old female who presents for evaluation of multiple complaints. Coughing started 3 months ago; No breathing issues  Coughing up mucus   PND has been going on for a few months   Has acid reflux, on omprazole   Throat feels sore in the morning  Feels like she is choking and has difficulty swallowing   Endorses hoarse voice. Feels reflux in her throat if she eats after 7PM but it is not acidic. Saw Dr. Tyrell Perry in 2022 for this same issue. Was placed on a PPI trial for 6 weeks. Unsure that it helped. She has a small known zenker's diverticulum per chart review. Has rheumatoid arthritis. Had issues with vertigo after spine surgery. No longer having this problem after PT. Also mentions she snores. Patient feels that she wakes up tired and unrefreshed. She has awakening at night every 2-3 hours - sometimes not related to using the restroom. Patient sometimes wakes up gasping for air.        PAST MEDICAL HISTORY:    Past Medical History:   Diagnosis Date    Anxiety state     Back problem     Esophageal reflux     Osteoarthritis     Rheumatoid arthritis (HCC)     TIA (transient ischemic attack)     Visual impairment     reading glasses       PAST SURGICAL HISTORY:    Past Surgical History:   Procedure Laterality Date    APPENDECTOMY  9/16/15     BACK SURGERY  09/14/2021     L4-5 extreme lateral interbody fusion with metallic cage and cadaver bone graft through the side, plus L4 inferior L3 laminectomy, foraminotomy, L4-5 discectomy and decompression of the spinal nerves with metallic G6-4 trans facet through the back       Omeprazole 40 MG Oral Capsule Delayed Release, Take 1 capsule (40 mg total) by mouth daily. , Disp: , Rfl:   leflunomide 20 MG Oral Tab, Take one daily. , Disp: 30 tablet, Rfl: 2  ENBREL SURECLICK 50 MG/ML Subcutaneous Solution Auto-injector, INJECT 1 PEN (50MG) SUBCUTANEOUSLY ONCE EVERY 7 DAYS, Disp: 12 each, Rfl: 1  LORazepam 1 MG Oral Tab, Take 1 tablet (1 mg total) by mouth daily as needed for Anxiety. , Disp: 30 tablet, Rfl: 1  DULOXETINE 30 MG Oral Cap DR Particles, TAKE ONE CAPSULE BY MOUTH DAILY, Disp: 90 capsule, Rfl: 0  fluticasone propionate 50 MCG/ACT Nasal Suspension, 2 sprays by Nasal route daily. , Disp: 3 each, Rfl: 4  predniSONE 5 MG Oral Tab, Take 6 now, 6 Q AM x 1 day, 5 Q AM x 2 days, 4 Q AM x 2, 3 Q AM x 2, 2 Q AM x 2, and then 1 Q AM. (Patient not taking: Reported on 7/7/2023), Disp: 100 tablet, Rfl: 0    No current facility-administered medications on file prior to visit. Allergies: No Known Allergies    SOCIAL HISTORY:  Social History    Tobacco Use      Smoking status: Never      Smokeless tobacco: Never    Alcohol use: Yes      Alcohol/week: 0.0 standard drinks of alcohol      Comment: social/occ      FAMILY HISTORY: Denies known family history of hearing loss, tinnitus, vertigo, or migraine. Denies known family history of head and neck cancer, thyroid cancer, bleeding disorders. REVIEW OF SYSTEMS:   Positives are in bold  Neuro: Headache, facial weakness, facial numbness, neck pain, vertigo  ENT: Hearing change, tinnitus, otorrhea, otalgia, aural fullness, ear pressure, vertigo, imbalance  Sinus pressure, rhinorrhea, congestion, facial pain, jaw pain, dysphagia, odynophagia, sore throat, voice changes, shortness of breath, PND, snoring    EXAMINATION:  I washed my hands with an alcohol-based hand gel prior to examination  Constitutional:   --Vitals: Weight 180 lb (81.6 kg).   General: no apparent distress, well-developed, conversant  Psych: affect pleasant and appropriate for age, alert and oriented  Neuro:  facial movement normal bilateral  Respiratory: No stridor, stertor or increased work of breathing  ENT:  --Nose: no external nasal deformity, anterior rhinoscopy: Septum midline, no inferior turbinate hypertrophy, mucosa healthy, no rhinorrhea  --OC/OP: No trismus. No masses or lesions noted over the gingiva, buccal mucosa, tongue, FOM, hard/soft palate, tonsillar pillars, posterior pharyngeal wall. Tonsils are 1+. Large BOT. --Neck: No palpable cervical lymphadenopathy, no thyromegaly, no masses or lesions over the bilateral submandibular or parotid glands  --Ear: (bilateral ears were examined under binocular microscopy)  Right ear microscopic exam:  Pinna: Normal, no lesions or masses. Mastoid: Nontender on palpation. External auditory canal: Clear, no masses or lesions. Tympanic membrane: Intact, no lesions, normal landmarks. Middle ear: Aerated. Left ear microscopic exam:  Pinna: Normal, no lesions or masses. Mastoid: Nontender on palpation. External auditory canal: Clear, no masses or lesions. Tympanic membrane: Intact, no lesions, normal landmarks. Middle ear: Aerated. Flexible fiberoptic laryngoscopy (date 7/7/23)  Informed consent obtained, patient was correctly identified  Topical anesthesia with aerosolized lidocaine and ephedrine spray in the bilateral nares  Findings: The flexible scope was advanced into the bilateral nares via the inferior meatus into the nasopharynx. No masses or lesions noted in the bilateral inferior meatus. The bilateral eustachian tubes are patent. No masses or lesions in the bilateral nasopharynx or fossae of Rosenmueller. No masses or lesions in the oropharynx, hypopharynx, supraglottis, glottis, subglottis. Normal TVF motion bilaterally in adduction and abduction.  Notable interarytenoid edema/erythema  Complications none, procedure well tolerated      ASSESSMENT/PLAN:  Shailesh Ho is a 69year old female with   (R06.83) Snoring  (primary encounter diagnosis)  (K21.9) LPRD (laryngopharyngeal reflux disease)     PLAN:  -Recommend sleep study for snoring, un-refreshing sleep, and possible apneas. Patient will also have a sleep medicine consultation.   -Discussed lifestyle changes - stress reduction (patient is currently transitioning from a stressful job), limiting caffeine/coffee, spicy food, acidic/sour food, alcohol, greasy foods, waiting 2 hours after meal prior to laying down  -Saw Dr. SUNDANCE [facility] in 2022 LPRD. Was placed on a PPI trial for 6 weeks. Unsure that it helped. Recommend GI evaluation. Situation reviewed with the patient in detail. Rob Maldonado MD  Otology/Otolaryngology  [facility]   [address] [facility]  Phone [phone]  Fax [phone]

## 2023-07-13 ENCOUNTER — TELEPHONE (OUTPATIENT)
Dept: GASTROENTEROLOGY | Facility: CLINIC | Age: 78
End: 2023-07-13

## 2023-07-13 ENCOUNTER — OFFICE VISIT (OUTPATIENT)
Dept: GASTROENTEROLOGY | Facility: CLINIC | Age: 78
End: 2023-07-13

## 2023-07-13 VITALS
SYSTOLIC BLOOD PRESSURE: 134 MMHG | HEART RATE: 78 BPM | WEIGHT: 178 LBS | BODY MASS INDEX: 29.66 KG/M2 | DIASTOLIC BLOOD PRESSURE: 82 MMHG | HEIGHT: 65 IN

## 2023-07-13 DIAGNOSIS — K21.9 GASTROESOPHAGEAL REFLUX DISEASE WITHOUT ESOPHAGITIS: ICD-10-CM

## 2023-07-13 DIAGNOSIS — R05.9 COUGH, UNSPECIFIED TYPE: ICD-10-CM

## 2023-07-13 DIAGNOSIS — R13.12 OROPHARYNGEAL DYSPHAGIA: Primary | ICD-10-CM

## 2023-07-13 DIAGNOSIS — R09.81 NASAL CONGESTION: ICD-10-CM

## 2023-07-13 DIAGNOSIS — R13.12 OROPHARYNGEAL DYSPHAGIA: ICD-10-CM

## 2023-07-13 DIAGNOSIS — R05.9 COUGH, UNSPECIFIED TYPE: Primary | ICD-10-CM

## 2023-07-13 PROCEDURE — 1126F AMNT PAIN NOTED NONE PRSNT: CPT | Performed by: INTERNAL MEDICINE

## 2023-07-13 PROCEDURE — 99204 OFFICE O/P NEW MOD 45 MIN: CPT | Performed by: INTERNAL MEDICINE

## 2023-07-13 RX ORDER — PANTOPRAZOLE SODIUM 40 MG/1
40 TABLET, DELAYED RELEASE ORAL
Qty: 180 TABLET | Refills: 3 | Status: SHIPPED | OUTPATIENT
Start: 2023-07-13

## 2023-07-13 NOTE — H&P
[facility], [facility] - Gastroenterology                                                                                                               Reason for consult: GERD, coughing/choking     Requesting physician or provider: Nomi Lobato MD    Patient presents with:  Gastro-esophageal Reflux      HPI:   Gertrude Gordon is a 69year old year-old female with history of psoriatic arthritis here for the following:        - 3-4 months ago, pt started coughing again - she has had this intermittently for the past 2-3 years. Pt coughs every day, all day. She takes cough drops throughout the day. - she had PND and started taking some allergy pills along with a nasal spray    - pt went to an ENT provider and laryngoscopy was unremarkable     - ENT then referred pt to GI.    - pt hasn't had any acid reflux for months and is off her omeprazole    - she hadn't taken her omeprazole 40 mg daily in a year and was tried on a 6 week trial of it once she developed her cough, which didn't make a difference. Pt took it on an empty stomach every day. - pt has been having choking when trying to swallow also. This mostly occurs with her saliva. Spices sometimes get caught in her throat as well.      - has had these symptoms also intermittently for the past few years. She thinks it's related to allergies. Video swallow test 2021 - done for oropharyngeal dysphagia was unremarkable. Denies family h/o CRC. Prior endoscopies:  CLN over 10 years ago - negative for polyps. FOBT negative 2019.       Soc:  -denies smoking  -denies heavy Etoh  -no illicit drug use    Wt Readings from Last 6 Encounters:  07/13/23 : 178 lb (80.7 kg)  07/07/23 : 180 lb (81.6 kg)  06/16/23 : 180 lb (81.6 kg)  04/04/23 : 175 lb (79.4 kg)  02/10/23 : 177 lb (80.3 kg)  12/06/22 : 174 lb 1.6 oz (79 kg)       History, Medications, Allergies, ROS: Past Medical History:   Diagnosis Date    Anxiety state     Back problem     Esophageal reflux     Osteoarthritis     Rheumatoid arthritis (HCC)     TIA (transient ischemic attack)     Visual impairment     reading glasses      Past Surgical History:   Procedure Laterality Date    APPENDECTOMY  9/16/15     BACK SURGERY  09/14/2021     L4-5 extreme lateral interbody fusion with metallic cage and cadaver bone graft through the side, plus L4 inferior L3 laminectomy, foraminotomy, L4-5 discectomy and decompression of the spinal nerves with metallic Q6-3 trans facet through the back      Family Hx:   Family History   Problem Relation Age of Onset    Arthritis Father     Other (Other) Father         stroke    Other (Other) Mother         stroke      Social History:   Social History     Socioeconomic History    Marital status:    Tobacco Use    Smoking status: Never    Smokeless tobacco: Never   Vaping Use    Vaping Use: Never used   Substance and Sexual Activity    Alcohol use: Yes     Alcohol/week: 0.0 standard drinks of alcohol     Comment: social/occ    Drug use: No    Sexual activity: Not Currently   Other Topics Concern    Caffeine Concern Yes    Exercise No   Social History Narrative    The patient uses the following assistive device(s):  single-point cane. temporary    The patient does live in a home with stairs. Medications (Active prior to today's visit):  Current Outpatient Medications   Medication Sig Dispense Refill    pantoprazole 40 MG Oral Tab EC Take 1 tablet (40 mg total) by mouth 2 (two) times daily before meals. 180 tablet 3    ENBREL SURECLICK 50 MG/ML Subcutaneous Solution Auto-injector INJECT 1 PEN (50MG) SUBCUTANEOUSLY ONCE EVERY 7 DAYS 12 each 1    LORazepam 1 MG Oral Tab Take 1 tablet (1 mg total) by mouth daily as needed for Anxiety.  30 tablet 1    DULOXETINE 30 MG Oral Cap DR Particles TAKE ONE CAPSULE BY MOUTH DAILY 90 capsule 0    fluticasone propionate 50 MCG/ACT Nasal Suspension 2 sprays by Nasal route daily. 3 each 4    leflunomide 20 MG Oral Tab Take one daily. (Patient not taking: Reported on 7/13/2023) 30 tablet 2    predniSONE 5 MG Oral Tab Take 6 now, 6 Q AM x 1 day, 5 Q AM x 2 days, 4 Q AM x 2, 3 Q AM x 2, 2 Q AM x 2, and then 1 Q AM. (Patient not taking: Reported on 7/7/2023) 100 tablet 0       Allergies:  No Known Allergies    ROS:   CONSTITUTIONAL:  negative for fevers, rigors  EYES:  negative for diplopia   RESPIRATORY:  negative for severe shortness of breath  CARDIOVASCULAR:  negative for crushing sub-sternal chest pain  GASTROINTESTINAL:  see HPI  GENITOURINARY:  negative for dysuria or gross hematuria  INTEGUMENT/BREAST:  SKIN:  negative for jaundice   ALLERGIC/IMMUNOLOGIC:  negative for hay fever  ENDOCRINE:  negative for cold intolerance and heat intolerance  MUSCULOSKELETAL:  negative for joint effusion/severe erythema  BEHAVIOR/PSYCH:  negative for psychotic behavior      PHYSICAL EXAM:   Blood pressure 134/82, pulse 78, height 5' 5\" (1.651 m), weight 178 lb (80.7 kg). Gen- Patient appears comfortable and in no acute discomfort  HEENT: the sclera appears anicteric, oropharynx clear, mucus membranes appear moist  CV- regular rate and rhythm, the extremities are warm and well perfused   Lung- Moves air well; No labored breathing  Abdomen- soft, non-tender exam in all quadrants without rigidity or guarding, non-distended, no abnormal bowel sounds noted, no masses are palpated  Skin- No jaundice  Ext: no cyanosis, clubbing or edema is evident. Neuro- Alert and interactive, and gross movements of extremities normal  Psych - appropriate, non-agitated    Labs/Imaging:     Patient's pertinent labs and imaging were reviewed and discussed with patient today. .  ASSESSMENT/PLAN:   69 F with h/o psoriatic arthritis here for the following:    Cough  Nasal congestion  ? allergies  Oropharyngeal dysphagia, choking - video swallow in 2021 was unremarkable.  ENT eval unremarkable. H/o GERD  CRC screening - last CLN was over 10 years ago. FOBT was last checked and negative in 2019. Pt is deferring repeat CLN or other screening tests for CRC at this time. Pt has had these symptoms on and off for the past 2-3 years. She is currently bothered by the persistent dry cough and choking/oropharyngeal dysphagia. Her symptoms seem to be seasonal and could be related to uncontrolled allergies given ongoing nasal congestion and post nasal drip, but this has not improved with her nasal spray. She was given a trial of omeprazole for 6 weeks given her h/o GERD, but this didn't change her cough or choking symptoms. She also didn't have any symptoms of heartburn before starting the PPI. Given the possibility of an extra-esophageal manifestation of reflux, recommend EGD for further evaluation to evaluate for any e/o reflux disease to explain her symptoms. If she has evidence of this, would do a trial of max dose PPI therapy to treat LPR. If not, it is unlikely to that her symptoms are related to reflux. Recommend    - hold all PPIs for now    - referral to A&I for optimization of her allergies/PND    - EGD with MAC    EGD consent: I have discussed the risks, benefits, and alternatives to upper endoscopy/enteroscopy with the patient/primary decision maker [who demonstrated understanding], including but not limited to the risks of bleeding, infection, pain, death, as well as the risks of anesthesia and perforation all leading to prolonged hospitalization, surgical intervention, or even death. I also specifically mentioned the miss rate of upper endoscopy of 5-10% in the best of all circumstances. The patient has agreed to sign an informed consent and elected to proceed with procedure with possible intervention [i.e. polypectomy, stent placement, etc.] as indicated. Orders This Visit:  No orders of the defined types were placed in this encounter.       Meds This Visit:  Requested Prescriptions     Signed Prescriptions Disp Refills    pantoprazole 40 MG Oral Tab  tablet 3     Sig: Take 1 tablet (40 mg total) by mouth 2 (two) times daily before meals. Imaging & Referrals:  ALLERGY - INTERNAL         Bev MD Osito          This note was partially prepared using [employer] [place] [employer] voice recognition dictation software. As a result, errors may occur. When identified, these errors have been corrected.  While every attempt is made to correct errors during dictation, discrepancies may still exist.

## 2023-07-13 NOTE — PATIENT INSTRUCTIONS
PLAN    - You can continue to hold your acid reducers    - See the allergist when able    - Follow up with Dr. Mojgan Bai after the procedure if symptoms continue    Instructions for the procedure  1. Schedule upper endoscopy (EGD) with anesthesia [Diagnosis: extra-esophageal manifestation of GERD]    2. You will need to go for COVID testing 72 hours before procedure. The testing team will call you a few days before your procedure to notify you where/when you can get COVID testing. If you do not go for COVID testing, the procedure cannot be performed. 3. If you start any NEW medication after your visit today, please notify us. Certain medications will need to be held before the procedure, or the procedure cannot be performed.

## 2023-07-13 NOTE — TELEPHONE ENCOUNTER
Scheduled for: EGD 22044   Provider Name: Dr Nerissa Pearce   Date: Wed 10/04/2023   Location: [facility]   Sedation: MAC   Time: 2 pm  Prep: Nothing after midnight the day before procedure and NPO 3 hrs prior procedure  Meds/Allergies Reconciled?: NKDA   Diagnosis with codes: cough R05.9, oropharyngeal dysphagia R13.12, Jocy Loft K21.9  Was patient informed to call insurance with codes (Y/N): Yes   Referral sent?: Yes   [address] [place] [address] or [address] [place] [address] notified?: I sent an electronic request to Endo Scheduling and received a confirmation today. Medication Orders: Pt is aware to NOT take iron pills, herbal meds and diet supplements for 7 days before exam. Also to NOT take any form of alcohol, recreational drugs and any forms of ED meds 24 hours before exam.      Misc Orders:       Further instructions given by staff:  Instructions given in office and pt verbalized understanding

## 2023-07-19 ENCOUNTER — OFFICE VISIT (OUTPATIENT)
Dept: PHYSICAL THERAPY | Facility: HOSPITAL | Age: 78
End: 2023-07-19
Attending: INTERNAL MEDICINE
Payer: MEDICARE

## 2023-07-19 PROCEDURE — 97110 THERAPEUTIC EXERCISES: CPT

## 2023-07-19 NOTE — PROGRESS NOTES
Diagnosis:   Tendinitis of shoulder, unspecified laterality (M77.8)   Calcific tendonitis of right shoulder (M75.31)           Referring Provider: Negin Love    Date of Evaluation:    6/14/2023    Precautions:  RA, lumbar surgery (laminectomy/fusion 2021) Next MD visit:   none scheduled  Date of Surgery: n/a     Insurance Primary/Secondary: MEDICARE / Alvina Navarro     # Auth Visits: na            Subjective: No soreness after previous visit. Reports her R shoulder has been doing well but her L shoulder is \"not too good. \" Has difficulty reaching/lifting overhead    Pain: 1/10 (R shoulder)   5/10 (L shoulder)      Objective:   BP: 148/74 mmHg (seated), SpO2: 97%, HR: 62  BP: 148/73 mmHg (supine)    Taken from IE:  Observation/Posture: forward shoulders B, ant translation L humeral head (L>R)  Palpation: TTP RC insertion L, no TTP LHBP  Sensation: intact light touch BUE  Cervical Screen: -    Cervical AROM:  Rotation: R 43 deg; L 35 deg - \"stiffness\"   Flexion: 30* deg - L pain  Extension: 40 deg  Side Bend: R 15 deg *L neck, L 12 deg    AROM (PROM): (* denotes performed with pain)  Shoulder  Elbow   Flexion: R 135* (165); L 35* (able to get to 85 deg after several repetitions)* (90)*  Abduction: R 140* (150); L 30* (sharp pain ant) (90)  ER: R occiput (90); L 0 (40)  IR: R L3 (45); L L5 (40) WFL    Supination/prontation painful L shoulder but full ROM     Accessory motion: Hypomobile L GHJ   Normal mobility R GHJ    Flexibility: severe tightness L UT   Mod tightness B pecs    Strength/MMT: (* denotes performed with pain)  Shoulder Elbow Scapular   Flexion: R 5-/5; L 3-/5*  Abduction: R 4/5*; L 3-/5*  ER: R 5/5; L 5/5  IR: R 5/5; L 5/5  Supraspinatus: 4+/5 Flexion: R 5/5; L 4+/5*  Extension: R 5/5; L 5-/5*   Scap: unable to test d/t pain             Assessment: Shortened session d/t patient not feeling well with elevated BP. Patient declined ER visit at this time.  Encouraged patient to go to the ER if symptoms continue. She reports she would perfer to wait as she has a MD appt tomorrow. Goals:   Goals: (to be met in 10 visits)   Pt will report improved ability to sleep without waking due to shoulder pain   Pt will improve shoulder flexion AROM to be able to reach into overhead cabinets without pain or restriction   Pt will improve shoulder abduction AROM to improve ability to don deodorant, don/doff shirts, and wash hair   Pt will increase shoulder AROM ER to brush her hair  Pt will improve shoulder strength throughout to 4/5 to improve function with reaching and carrying  Pt will be able to sleep without waking due to shoulder pain   Pt will be independent and compliant with comprehensive HEP to maintain progress achieved in PT       Plan: Patient will be seen for 2 x/week or a total of 10 visits over a 90 day period. Treatment will include: Manual Therapy, Neuromuscular Re-education, Therapeutic Activities, Therapeutic Exercise, Home Exercise Program instruction and modalties prn    Date: 6/21/2023  TX#: 2/10 Date: 7/5/23                TX#: 3/10 Date:  7/19/23               TX#: 4/10 Date:                 TX#: 5/ Date:    Tx#: 6/   MT: 8 mins  - gentle GH L mobs Gr II  - gentle STM L shoulder MT: 10 mins  - -gentle GH B mobs Gr II- III  - gentle STM B shoulder      TE: 30 mins  - Pulleys, x4'  - low pec stretch doorway, x10  - doorway wall slide flex, x10 L  - standing row, YTB, 2x10 B  - standing single arm GH ext, YTB, x10 B  - supine chest press 1# alessio, x10  - supine can flexion 1#, x10  - review HEP TE: 28 mins  - Pulleys, x3'  - UBE, L1.0, x4' forw/2' retro  - standing rows, YTB, x20 B  - standing GHJ ext, YTB, x20 B  - standing ER single arm, YTB, x20 B  - AROM flexion, x10 B (seated)  - AROM abd, x3 B (seated)  - supine chest press, 2#, x20  - supine cane flex overhead, 2# alessio, x10   TE: 15 mins  - UBE, L1.0, 2' forw/2' retro  - wall slides, 2x10 L  - standing rows, GTB, x8 - d/c d/t not feeling well  - BP assessment                   HEP: table slides (flex and abd), scapular retraction - pt declines print out  6/21/23: print out: wall slides, scap retraction, rows (YTB)  Charges: 1TE       Total Timed Treatment: 15 min  Total Treatment Time: 15 min

## 2023-07-20 ENCOUNTER — OFFICE VISIT (OUTPATIENT)
Dept: INTERNAL MEDICINE CLINIC | Facility: CLINIC | Age: 78
End: 2023-07-20
Payer: MEDICARE

## 2023-07-20 ENCOUNTER — LAB ENCOUNTER (OUTPATIENT)
Dept: LAB | Facility: REFERENCE LAB | Age: 78
End: 2023-07-20
Attending: INTERNAL MEDICINE
Payer: MEDICARE

## 2023-07-20 VITALS
HEART RATE: 86 BPM | HEIGHT: 64.37 IN | TEMPERATURE: 97 F | BODY MASS INDEX: 29.98 KG/M2 | DIASTOLIC BLOOD PRESSURE: 76 MMHG | WEIGHT: 175.63 LBS | SYSTOLIC BLOOD PRESSURE: 122 MMHG | OXYGEN SATURATION: 95 %

## 2023-07-20 DIAGNOSIS — M48.062 SPINAL STENOSIS OF LUMBAR REGION WITH NEUROGENIC CLAUDICATION: ICD-10-CM

## 2023-07-20 DIAGNOSIS — M43.16 SPONDYLOLISTHESIS OF LUMBAR REGION: ICD-10-CM

## 2023-07-20 DIAGNOSIS — M48.061 LUMBAR FORAMINAL STENOSIS: ICD-10-CM

## 2023-07-20 DIAGNOSIS — N18.31 STAGE 3A CHRONIC KIDNEY DISEASE (HCC): ICD-10-CM

## 2023-07-20 DIAGNOSIS — N39.3 STRESS INCONTINENCE OF URINE: Primary | ICD-10-CM

## 2023-07-20 DIAGNOSIS — M51.26 LUMBAR HERNIATED DISC: ICD-10-CM

## 2023-07-20 DIAGNOSIS — N39.3 STRESS INCONTINENCE OF URINE: ICD-10-CM

## 2023-07-20 DIAGNOSIS — M05.79 SEROPOSITIVE RHEUMATOID ARTHRITIS OF MULTIPLE SITES (HCC): ICD-10-CM

## 2023-07-20 DIAGNOSIS — Z12.31 BREAST CANCER SCREENING BY MAMMOGRAM: ICD-10-CM

## 2023-07-20 DIAGNOSIS — E78.2 MIXED HYPERLIPIDEMIA: ICD-10-CM

## 2023-07-20 DIAGNOSIS — R06.83 SNORES: ICD-10-CM

## 2023-07-20 PROBLEM — K22.5 ZENKER DIVERTICULA: Status: RESOLVED | Noted: 2021-07-28 | Resolved: 2023-07-20

## 2023-07-20 PROBLEM — N32.81 OVERACTIVE BLADDER: Status: RESOLVED | Noted: 2020-12-19 | Resolved: 2023-07-20

## 2023-07-20 PROBLEM — M54.59 INTRACTABLE LOW BACK PAIN: Status: RESOLVED | Noted: 2021-08-06 | Resolved: 2023-07-20

## 2023-07-20 PROBLEM — Z01.818 PREOP TESTING: Status: RESOLVED | Noted: 2021-09-14 | Resolved: 2023-07-20

## 2023-07-20 PROBLEM — M54.16 LUMBAR RADICULOPATHY: Status: RESOLVED | Noted: 2021-08-06 | Resolved: 2023-07-20

## 2023-07-20 PROBLEM — M51.9 LUMBAR DISC DISEASE: Status: RESOLVED | Noted: 2021-08-06 | Resolved: 2023-07-20

## 2023-07-20 PROBLEM — F40.240 CLAUSTROPHOBIA: Status: RESOLVED | Noted: 2020-05-12 | Resolved: 2023-07-20

## 2023-07-20 LAB
ALBUMIN SERPL-MCNC: 3.6 G/DL (ref 3.4–5)
ALBUMIN/GLOB SERPL: 0.9 {RATIO} (ref 1–2)
ALP LIVER SERPL-CCNC: 71 U/L
ALT SERPL-CCNC: 24 U/L
ANION GAP SERPL CALC-SCNC: 4 MMOL/L (ref 0–18)
AST SERPL-CCNC: 20 U/L (ref 15–37)
BILIRUB SERPL-MCNC: 0.4 MG/DL (ref 0.1–2)
BILIRUB UR QL: NEGATIVE
BUN BLD-MCNC: 16 MG/DL (ref 7–18)
BUN/CREAT SERPL: 15.5 (ref 10–20)
CALCIUM BLD-MCNC: 9.2 MG/DL (ref 8.5–10.1)
CHLORIDE SERPL-SCNC: 109 MMOL/L (ref 98–112)
CHOLEST SERPL-MCNC: 200 MG/DL (ref ?–200)
CLARITY UR: CLEAR
CO2 SERPL-SCNC: 26 MMOL/L (ref 21–32)
CREAT BLD-MCNC: 1.03 MG/DL
EGFRCR SERPLBLD CKD-EPI 2021: 56 ML/MIN/1.73M2 (ref 60–?)
FASTING PATIENT LIPID ANSWER: YES
FASTING STATUS PATIENT QL REPORTED: YES
GLOBULIN PLAS-MCNC: 4.1 G/DL (ref 2.8–4.4)
GLUCOSE BLD-MCNC: 100 MG/DL (ref 70–99)
GLUCOSE UR-MCNC: NORMAL MG/DL
HDLC SERPL-MCNC: 45 MG/DL (ref 40–59)
HGB UR QL STRIP.AUTO: NEGATIVE
KETONES UR-MCNC: NEGATIVE MG/DL
LDLC SERPL CALC-MCNC: 140 MG/DL (ref ?–100)
LEUKOCYTE ESTERASE UR QL STRIP.AUTO: 250
NITRITE UR QL STRIP.AUTO: NEGATIVE
NONHDLC SERPL-MCNC: 155 MG/DL (ref ?–130)
OSMOLALITY SERPL CALC.SUM OF ELEC: 289 MOSM/KG (ref 275–295)
PH UR: 5 [PH] (ref 5–8)
POTASSIUM SERPL-SCNC: 4.4 MMOL/L (ref 3.5–5.1)
PROT SERPL-MCNC: 7.7 G/DL (ref 6.4–8.2)
PROT UR-MCNC: NEGATIVE MG/DL
SODIUM SERPL-SCNC: 139 MMOL/L (ref 136–145)
SP GR UR STRIP: 1.02 (ref 1–1.03)
TRIGL SERPL-MCNC: 82 MG/DL (ref 30–149)
UROBILINOGEN UR STRIP-ACNC: NORMAL
VLDLC SERPL CALC-MCNC: 15 MG/DL (ref 0–30)

## 2023-07-20 PROCEDURE — 81001 URINALYSIS AUTO W/SCOPE: CPT

## 2023-07-20 PROCEDURE — 1125F AMNT PAIN NOTED PAIN PRSNT: CPT | Performed by: INTERNAL MEDICINE

## 2023-07-20 PROCEDURE — 36415 COLL VENOUS BLD VENIPUNCTURE: CPT | Performed by: INTERNAL MEDICINE

## 2023-07-20 PROCEDURE — 96160 PT-FOCUSED HLTH RISK ASSMT: CPT | Performed by: INTERNAL MEDICINE

## 2023-07-20 PROCEDURE — 80053 COMPREHEN METABOLIC PANEL: CPT | Performed by: INTERNAL MEDICINE

## 2023-07-20 PROCEDURE — 99214 OFFICE O/P EST MOD 30 MIN: CPT | Performed by: INTERNAL MEDICINE

## 2023-07-20 PROCEDURE — 80061 LIPID PANEL: CPT | Performed by: INTERNAL MEDICINE

## 2023-07-21 PROBLEM — N18.31 STAGE 3A CHRONIC KIDNEY DISEASE (HCC): Status: ACTIVE | Noted: 2023-07-21

## 2023-07-24 ENCOUNTER — OFFICE VISIT (OUTPATIENT)
Dept: PHYSICAL THERAPY | Facility: HOSPITAL | Age: 78
End: 2023-07-24
Attending: INTERNAL MEDICINE
Payer: MEDICARE

## 2023-07-24 PROCEDURE — 97140 MANUAL THERAPY 1/> REGIONS: CPT

## 2023-07-24 NOTE — PROGRESS NOTES
Diagnosis:   Tendinitis of shoulder, unspecified laterality (M77.8)   Calcific tendonitis of right shoulder (M75.31)           Referring Provider: Bairon Rock    Date of Evaluation:    6/14/2023    Precautions:  RA, lumbar surgery (laminectomy/fusion 2021) Next MD visit:   none scheduled  Date of Surgery: n/a     Insurance Primary/Secondary: MEDICARE / [employer]     # Auth Visits: na            Subjective: Feeling well today. Reports her BP is better. R shoulder has been feeling well; L shoulder pain continues in the front with lifting her arm. Pain: 1/10 (R shoulder)   8/10 (anterior L shoulder)      Objective:   BP: 148/74 mmHg (seated), SpO2: 97%, HR: 62  BP: 148/73 mmHg (supine)    Taken from IE:  Observation/Posture: forward shoulders B, ant translation L humeral head (L>R)  Palpation: TTP RC insertion L, no TTP LHBP  Sensation: intact light touch BUE  Cervical Screen: -    Cervical AROM:  Rotation: R 43 deg; L 35 deg - \"stiffness\"   Flexion: 30* deg - L pain  Extension: 40 deg  Side Bend: R 15 deg *L neck, L 12 deg    AROM (PROM): (* denotes performed with pain)  Shoulder  Elbow   Flexion: R 135* (165); L 35* (able to get to 85 deg after several repetitions)* (90)*  Abduction: R 140* (150); L 30* (sharp pain ant) (90)  ER: R occiput (90); L 0 (40)  IR: R L3 (45); L L5 (40) WFL    Supination/prontation painful L shoulder but full ROM     Accessory motion: Hypomobile L GHJ   Normal mobility R GHJ    Flexibility: severe tightness L UT   Mod tightness B pecs    Strength/MMT: (* denotes performed with pain)  Shoulder Elbow Scapular   Flexion: R 5-/5; L 3-/5*  Abduction: R 4/5*; L 3-/5*  ER: R 5/5; L 5/5  IR: R 5/5; L 5/5  Supraspinatus: 4+/5 Flexion: R 5/5; L 4+/5*  Extension: R 5/5; L 5-/5*   Scap: unable to test d/t pain             Assessment: Pt regains full motion after STM today and gentl GHJ mobs in supine. She denies pain at end of session.  Recommended continued self STM at able and focus on proper scapular placement to help with overhead reaching. Patient is traveling beginning mid-week; may consider continued focus on soft tissue work as needed upon her return. Goals:   Goals: (to be met in 10 visits)   Pt will report improved ability to sleep without waking due to shoulder pain   Pt will improve shoulder flexion AROM to be able to reach into overhead cabinets without pain or restriction   Pt will improve shoulder abduction AROM to improve ability to don deodorant, don/doff shirts, and wash hair   Pt will increase shoulder AROM ER to brush her hair  Pt will improve shoulder strength throughout to 4/5 to improve function with reaching and carrying  Pt will be able to sleep without waking due to shoulder pain   Pt will be independent and compliant with comprehensive HEP to maintain progress achieved in PT       Plan: Patient will be seen for 2 x/week or a total of 10 visits over a 90 day period. Treatment will include: Manual Therapy, Neuromuscular Re-education, Therapeutic Activities, Therapeutic Exercise, Home Exercise Program instruction and modalties prn    Date: 6/21/2023  TX#: 2/10 Date: 7/5/23                TX#: 3/10 Date:  7/19/23               TX#: 4/10 Date:  7/24/23               TX#: 5/10 Date:    Tx#: 6/   MT: 8 mins  - gentle GH L mobs Gr II  - gentle STM L shoulder MT: 10 mins  - -gentle GH B mobs Gr II- III  - gentle STM B shoulder  MT: 40 mins  - GHJ mobs (AP and inf) L  - MFR biceps, deltoid, RC insertion, pec L  - PROM L shoulder    TE: 30 mins  - Pulleys, x4'  - low pec stretch doorway, x10  - doorway wall slide flex, x10 L  - standing row, YTB, 2x10 B  - standing single arm GH ext, YTB, x10 B  - supine chest press 1# alessio, x10  - supine can flexion 1#, x10  - review HEP TE: 28 mins  - Pulleys, x3'  - UBE, L1.0, x4' forw/2' retro  - standing rows, YTB, x20 B  - standing GHJ ext, YTB, x20 B  - standing ER single arm, YTB, x20 B  - AROM flexion, x10 B (seated)  - AROM abd, x3 B (seated)  - supine chest press, 2#, x20  - supine cane flex overhead, 2# alessio, x10   TE: 15 mins  - UBE, L1.0, 2' forw/2' retro  - wall slides, 2x10 L  - standing rows, GTB, x8 - d/c d/t not feeling well  - BP assessment TE: 5 mins  - supine scap ret, x5  - supine single arm narrow chest press, 2x10 L                  HEP: table slides (flex and abd), scapular retraction - pt declines print out  6/21/23: print out: wall slides, scap retraction, rows (YTB)  Charges: 3MT       Total Timed Treatment: 45 min  Total Treatment Time: 45 min

## 2023-07-26 RX ORDER — LORAZEPAM 1 MG/1
1 TABLET ORAL
Qty: 30 TABLET | Refills: 2 | Status: SHIPPED | OUTPATIENT
Start: 2023-07-26

## 2023-08-19 NOTE — TELEPHONE ENCOUNTER
ENBREL SURECLICK 50 MG/ML Subcutaneous Solution Auto-injector, INJECT 1 PEN (50MG) SUBCUTANEOUSLY ONCE EVERY 7 DAYS, Disp: 12 each, Rfl: 1

## 2023-08-21 RX ORDER — MEDROXYPROGESTERONE ACETATE 150 MG/ML
INJECTION, SUSPENSION INTRAMUSCULAR
Qty: 12 EACH | Refills: 1 | Status: SHIPPED | OUTPATIENT
Start: 2023-08-21

## 2023-08-21 NOTE — TELEPHONE ENCOUNTER
Requested Prescriptions     Pending Prescriptions Disp Refills    ENBREL SURECLICK 50 MG/ML Subcutaneous Solution Auto-injector 12 each 1     Sig: INJECT 1 PEN (50MG) SUBCUTANEOUSLY ONCE EVERY 7 DAYS     Spoke with the PA department at [phone] per representative, Miri Doing, claim is rejecting for refill too soon. Cannot fill until 3/3/2023. When she does claim 90 days out, no PA required for Enbrel 50 mg Sureclick pen.        5/3/23    LF; 3/9/23 #12 EACH W/ 1 RD  LOV: 6/16/23  Future Appointments   Date Time Provider Willie Roma   10/2/2023 11:30  [facility] SLEEP ROOMS 300 [facility] SLEEP EM Sleep Ctr   10/4/2023  2:00  [address], PROCEDURE [address] None   10/10/2023  1:00 PM Belinda Drummond MD UROG [facility]   10/11/2023  2:00 PM Farhat Forde MD [facility]   11/7/2023 12:00 [facility] [place]     Labs:   Component      Latest Ref Rng 5/3/2023 7/20/2023   WBC      4.0 - 11.0 x10(3) uL 7.0     RBC      3.80 - 5.30 x10(6)uL 4.93     Hemoglobin      12.0 - 16.0 g/dL 11.5 (L)     Hematocrit      35.0 - 48.0 % 37.1     MCV      80.0 - 100.0 fL 75.3 (L)     MCH      26.0 - 34.0 pg 23.3 (L)     MCHC      31.0 - 37.0 g/dL 31.0     RDW-SD      35.1 - 46.3 fL 40.8     RDW      11.0 - 15.0 % 15.0     Platelet Count      196.4 - 450.0 10(3)uL 265.0     Prelim Neutrophil Abs      1.50 - 7.70 x10 (3) uL 4.47     Neutrophils Absolute      1.50 - 7.70 x10(3) uL 4.47     Lymphocytes Absolute      1.00 - 4.00 x10(3) uL 1.46     Monocytes Absolute      0.10 - 1.00 x10(3) uL 0.76     Eosinophils Absolute      0.00 - 0.70 x10(3) uL 0.22     Basophils Absolute      0.00 - 0.20 x10(3) uL 0.03     Immature Granulocyte Absolute      0.00 - 1.00 x10(3) uL 0.02     Neutrophils %      % 64.2     Lymphocytes %      % 21.0     Monocytes %      % 10.9     Eosinophils %      % 3.2     Basophils %      % 0.4     Immature Granulocyte %      % 0.3     Glucose      70 - 99 mg/dL  100 (H)    Sodium      136 - 145 mmol/L  139 Potassium      3.5 - 5.1 mmol/L  4.4    Chloride      98 - 112 mmol/L  109    Carbon Dioxide, Total      21.0 - 32.0 mmol/L  26.0    ANION GAP      0 - 18 mmol/L  4    BUN      7 - 18 mg/dL  16    CREATININE      0.55 - 1.02 mg/dL 1.09 (H)  1.03 (H)    BUN/CREATININE RATIO      10.0 - 20.0   15.5    CALCIUM      8.5 - 10.1 mg/dL  9.2    CALCULATED OSMOLALITY      275 - 295 mOsm/kg  289    eGFR-Cr      >=60 mL/min/1.73m2 53 (L)  56 (L)    ALT (SGPT)      13 - 56 U/L  24    AST (SGOT)      15 - 37 U/L 31  20    ALKALINE PHOSPHATASE      55 - 142 U/L  71    Total Bilirubin      0.1 - 2.0 mg/dL  0.4    PROTEIN, TOTAL      6.4 - 8.2 g/dL  7.7    Albumin      3.4 - 5.0 g/dL 3.6  3.6    Globulin      2.8 - 4.4 g/dL  4.1    A/G Ratio      1.0 - 2.0   0.9 (L)    Patient Fasting for CMP? Yes    Cholesterol, Total      <200 mg/dL  200 (H)    HDL Cholesterol      40 - 59 mg/dL  45    Triglycerides      30 - 149 mg/dL  82    LDL Cholesterol Calc      <100 mg/dL  140 (H)    VLDL      0 - 30 mg/dL  15    NON-HDL CHOLESTEROL      <130 mg/dL  155 (H)    Patient Fasting for Lipid? Yes    SED RATE      0 - 30 mm/Hr 81 (H)     C-REACTIVE PROTEIN      <0.30 mg/dL 1.94 (H)        Legend:  (L) Low  (H) High    ASSESSMENT/PLAN:      1. CCP and RF positive Ra (rheumatoid arthritis). Flaring diffusely. Left shoulder especially bad. After informed consent was obtained, her left shoulder was injected into the subacromial area with 40 mg of Depo-Medrol and 1 cc 1% lidocaine. It was tolerated well. Allyson Fernandes She will take another prednisone 'burst', but stay on 5 mg a day. In addition to her Enbrel, she will start leflunomide 20 mg daily. It should 'kick in' over the next 6 to 8 weeks. She will follow-up in rheumatology in 2 months. 2. Encounter for therapeutic drug monitoring. 3. H/O rotator cuff tendinitis. Right shoulder x-rays support calcific tendinitis. She will continue physical therapy. 4.  Borderline diabetes, high cholesterol. Diet.  5.  Status post lumbar spine surgery in the summer 2021.

## 2023-09-19 NOTE — TELEPHONE ENCOUNTER
Not sure if we would have to call the pharmacy here to cancel the refills on this? Let me know if I need to do anything. Requested Prescriptions     Pending Prescriptions Disp Refills    LORazepam 1 MG Oral Tab 30 tablet 2     Sig: Take 1 tablet (1 mg total) by mouth daily as needed for Anxiety.      LAST REFILL DATE 7/26/23   QUANTITY REQUESTED    DAY SUPPLY    DIAGNOSIS    LAST OFFICE VISIT  7/20/23   FOLLOW UP DUE      Future Appointments   Date Time Provider Willie Brandon   10/2/2023 11:30 AM [address] [place] [address] SLEEP ROOMS 300 [facility] SLEEP EM Sleep Ctr   10/4/2023  2:00  [facility], PROCEDURE [address] None   10/10/2023  1:00 PM Moy Piedra MD [address] [place] [address]   10/11/2023  2:00 [facility]   11/7/2023 12:00 PM [facility] RM1 [facility] EM [facility]

## 2023-09-20 RX ORDER — LORAZEPAM 1 MG/1
1 TABLET ORAL
Qty: 30 TABLET | Refills: 2 | Status: SHIPPED | OUTPATIENT
Start: 2023-09-20

## 2023-09-21 ENCOUNTER — TELEPHONE (OUTPATIENT)
Dept: INTERNAL MEDICINE CLINIC | Facility: CLINIC | Age: 78
End: 2023-09-21

## 2023-09-21 NOTE — TELEPHONE ENCOUNTER
Pharmacy is calling stating they received a medication for Lorazepam 1mg. They want to confirm if patient is on vacation or why is the prescription being send to [place] if patient is from [place] and the prescriber as well. They need this information for documentation since this is a controlled medication.

## 2023-10-02 ENCOUNTER — OFFICE VISIT (OUTPATIENT)
Dept: SLEEP CENTER | Age: 78
End: 2023-10-02
Attending: OTOLARYNGOLOGY
Payer: MEDICARE

## 2023-10-02 DIAGNOSIS — R06.83 SNORING: ICD-10-CM

## 2023-10-02 PROCEDURE — 95806 SLEEP STUDY UNATT&RESP EFFT: CPT

## 2023-10-04 ENCOUNTER — ANESTHESIA (OUTPATIENT)
Dept: ENDOSCOPY | Facility: HOSPITAL | Age: 78
End: 2023-10-04
Payer: MEDICARE

## 2023-10-04 ENCOUNTER — ANESTHESIA EVENT (OUTPATIENT)
Dept: ENDOSCOPY | Facility: HOSPITAL | Age: 78
End: 2023-10-04
Payer: MEDICARE

## 2023-10-04 ENCOUNTER — HOSPITAL ENCOUNTER (OUTPATIENT)
Facility: HOSPITAL | Age: 78
Setting detail: HOSPITAL OUTPATIENT SURGERY
Discharge: HOME | End: 2023-10-04
Attending: INTERNAL MEDICINE | Admitting: INTERNAL MEDICINE
Payer: MEDICARE

## 2023-10-04 VITALS
OXYGEN SATURATION: 99 % | RESPIRATION RATE: 23 BRPM | HEIGHT: 65 IN | HEART RATE: 59 BPM | BODY MASS INDEX: 28.32 KG/M2 | SYSTOLIC BLOOD PRESSURE: 147 MMHG | DIASTOLIC BLOOD PRESSURE: 79 MMHG | WEIGHT: 170 LBS

## 2023-10-04 DIAGNOSIS — T17.308S CHOKING, SEQUELA: Primary | ICD-10-CM

## 2023-10-04 DIAGNOSIS — R05.9 COUGH, UNSPECIFIED TYPE: ICD-10-CM

## 2023-10-04 DIAGNOSIS — R13.12 OROPHARYNGEAL DYSPHAGIA: ICD-10-CM

## 2023-10-04 DIAGNOSIS — K21.9 GASTROESOPHAGEAL REFLUX DISEASE WITHOUT ESOPHAGITIS: ICD-10-CM

## 2023-10-04 DIAGNOSIS — K22.5 ZENKER'S DIVERTICULUM: ICD-10-CM

## 2023-10-04 PROCEDURE — 0DB28ZX EXCISION OF MIDDLE ESOPHAGUS, VIA NATURAL OR ARTIFICIAL OPENING ENDOSCOPIC, DIAGNOSTIC: ICD-10-PCS | Performed by: INTERNAL MEDICINE

## 2023-10-04 PROCEDURE — 43239 EGD BIOPSY SINGLE/MULTIPLE: CPT | Performed by: INTERNAL MEDICINE

## 2023-10-04 PROCEDURE — 0DB68ZX EXCISION OF STOMACH, VIA NATURAL OR ARTIFICIAL OPENING ENDOSCOPIC, DIAGNOSTIC: ICD-10-PCS | Performed by: INTERNAL MEDICINE

## 2023-10-04 RX ORDER — SODIUM CHLORIDE, SODIUM LACTATE, POTASSIUM CHLORIDE, CALCIUM CHLORIDE 600; 310; 30; 20 MG/100ML; MG/100ML; MG/100ML; MG/100ML
INJECTION, SOLUTION INTRAVENOUS CONTINUOUS
Status: DISCONTINUED | OUTPATIENT
Start: 2023-10-04 | End: 2023-10-04

## 2023-10-04 RX ORDER — LIDOCAINE HYDROCHLORIDE 10 MG/ML
INJECTION, SOLUTION EPIDURAL; INFILTRATION; INTRACAUDAL; PERINEURAL AS NEEDED
Status: DISCONTINUED | OUTPATIENT
Start: 2023-10-04 | End: 2023-10-04 | Stop reason: SURG

## 2023-10-04 RX ADMIN — LIDOCAINE HYDROCHLORIDE 50 MG: 10 INJECTION, SOLUTION EPIDURAL; INFILTRATION; INTRACAUDAL; PERINEURAL at 14:01:00

## 2023-10-04 RX ADMIN — LIDOCAINE HYDROCHLORIDE 50 MG: 10 INJECTION, SOLUTION EPIDURAL; INFILTRATION; INTRACAUDAL; PERINEURAL at 14:02:00

## 2023-10-04 RX ADMIN — SODIUM CHLORIDE, SODIUM LACTATE, POTASSIUM CHLORIDE, CALCIUM CHLORIDE: 600; 310; 30; 20 INJECTION, SOLUTION INTRAVENOUS at 14:00:00

## 2023-10-04 NOTE — ANESTHESIA POSTPROCEDURE EVALUATION
Patient: Thor Augustin    Procedure Summary       Date: 10/04/23 Room / Location: [address] [place] [address] ENDOSCOPY 04 / [address] [place] [address] ENDOSCOPY    Anesthesia Start: 1400 Anesthesia Stop: 0672    Procedure: ESOPHAGOGASTRODUODENOSCOPY Diagnosis:       Oropharyngeal dysphagia      Cough, unspecified type      Gastroesophageal reflux disease without esophagitis      (shatzki ring, hiatal hernia, venker diverticulum)    Surgeons: Luís De La Fuente MD Anesthesiologist: Lisa Arellano CRNA    Anesthesia Type: MAC ASA Status: 2            Anesthesia Type: MAC    Vitals Value Taken Time   /77 10/04/23 1420   Temp  10/04/23 1423   Pulse 63 10/04/23 1422   Resp 27 10/04/23 1422   SpO2 98 % 10/04/23 1422   Vitals shown include unfiled device data.     [address] [place] [address] AN Post Evaluation:   Patient Evaluated in PACU  Patient Participation: complete - patient participated  Level of Consciousness: awake and alert  Pain Score: 0  Pain Management: adequate  Airway Patency:patent  Yes    Cardiovascular Status: acceptable  Respiratory Status: acceptable  Postoperative Hydration acceptable      Yumiko Arrieta CRNA  10/4/2023 2:23 PM

## 2023-10-04 NOTE — DISCHARGE INSTRUCTIONS
Home Care Instructions for  Gastroscopy with Sedation    Diet:  - Resume your regular diet as tolerated unless otherwise instructed. - Start with light meals to minimize bloating.  - Do not drink alcohol today. Medication:  - If you have questions about resuming your normal medications, please contact your Primary Care Physician. Activities:  - Take it easy today. Do not return to work today. - Do not drive today. - Do not operate any machinery today (including kitchen equipment). Colonoscopy:  - You may notice some rectal \"spotting\" (a little blood on the toilet tissue) for a day or two after the exam. This is normal.  - If you experience any rectal bleeding (not spotting), persistent tenderness or sharp severe abdominal pains, oral temperature over 100 degrees Fahrenheit, light-headedness or dizziness, or any other problems, contact your doctor. Gastroscopy:  - You may have a sore throat for 2-3 days following the exam. This is normal. Gargling with warm salt water (1/2 tsp salt to 1 glass warm water) or using throat lozenges will help. - If you experience any sharp pain in your neck, abdomen or chest, vomiting of blood, oral temperature over 100 degrees Fahrenheit, light-headedness or dizziness, or any other problems, contact your doctor. **If unable to reach your doctor, please go to the [facility] Emergency Room**    - Your referring physician will receive a full report of your examination.  - If you do not hear from your doctor's office within two weeks of your biopsy, please call them for your results. You may be able to see your laboratory results in [email or web address] between 4 and 7 business days. In some cases, your physician may not have viewed the results before they are released to [address] If you have questions regarding your results contact the physician who ordered the test/exam by phone or via [address] by choosing \"Ask a Medical Question. \"

## 2023-10-04 NOTE — OPERATIVE REPORT
ESOPHAGOGASTRODUODENOSCOPY (EGD) REPORT    Shemar Lacey     1945 Age 69year old   PCP Rachid Mcgee MD Endoscopist Cameron Morel MD     Date of procedure: 10/04/23    Procedure: EGD w/ cold biopsy    Pre-operative diagnosis: choking, oropharyngeal dysphagia, GERD, chronic cough    Post-operative diagnosis: Zenker's diverticulum, Schatzki's ring, 3 cm hiatal hernia, gastritis     Medications: MAC    Complications: none    Procedure:  Informed consent was obtained from the patient after the risks of the procedure were discussed, including but not limited to bleeding, perforation, aspiration, infection, or possibility of a missed lesion. After discussions of the risks/benefits and alternatives to this procedure, as well as the planned sedation, the patient was placed in the left lateral decubitus position and begun on continuous blood pressure pulse oximetry and EKG monitoring and this was maintained throughout the procedure. Once an adequate level of sedation was obtained a bite block was placed. Then the lubricated tip of the [id number] diagnostic video upper endoscope was inserted and advanced using direct visualization into the posterior pharynx and ultimately into the esophagus. Complications: None    Findings:      1. Esophagus: The squamocolumnar junction (SCJ) was noted at 36 cm. There was a non-obsturcting Schatzki's ring at the SCJ that was disrupted in four quadrants with cold forceps biopsies. Mid-esophageal biopsies were taken and placed in the same jar. The SCJ was otherwise normal. The diaphragmatic pinch was noted noted at 39 cm from the incisors. There was a 3 cm hiatal hernia. There was a proximal esophageal Zenker's diverticulum measuring ~5-10 mm. The esophageal mucosa appeared otherwise unremarkable. 2. Stomach: The stomach distended normally. Normal rugal folds were seen. The pylorus was patent.  The gastric mucosa appeared mildly erythematous diffusely and cold forceps biopsies were taken of the antrum, incisura, and body. Retroflexion revealed a normal fundus and a mildly patulous cardia. 3. Duodenum: The duodenal mucosa appeared normal in the 1st and 2nd portion of the duodenum. Impression:  1. A 5-10 mm Zenker's diverticulum noted in the proximal esophagus. The patient's choking may be due to the Zenker's diverticulum. 2. Non-obstructing Schatzki's ring s/p disruption in four quadrants with cold forceps biopsies. 3. 3 cm hiatal hernia. 4. Mild gastritis biopsied. Recommend:  1. Await pathology. 2. Schedule the esophagram when able. 3. Follow up with ENT after the esophagram to further discuss management of the Zenker's diverticulum. 4.  Call [phone] to set up an appointment with one of the allergy doctors for your persistent post nasal drip and congestion. 5. Start pantoprazole 40 mg daily for acid reflux. 6. Follow up with Dr. De Leon Loge in after the esophagram is done and after you see ENT and allergy.      >>>If tissue was sampled/removed and you have not received your pathology results either by phone or letter within 2 weeks, please call our office at [phone].     Specimens: stomach, esophagus    Blood loss: <1 ml

## 2023-10-04 NOTE — H&P
History & Physical Examination    Patient Name: eLvi Betancourt  MRN: [id number]  [facility]: [id number]  YOB: 1945    Diagnosis: Oropharyngeal dysphagia, choking, GERD     LORazepam 1 MG Oral Tab, Take 1 tablet (1 mg total) by mouth daily as needed for Anxiety. , Disp: 30 tablet, Rfl: 2, 94/4/9272  ENBREL SURECLICK 50 MG/ML Subcutaneous Solution Auto-injector, INJECT 1 PEN (50MG) SUBCUTANEOUSLY ONCE EVERY 7 DAYS, Disp: 12 each, Rfl: 1  fluticasone propionate 50 MCG/ACT Nasal Suspension, 2 sprays by Nasal route daily. , Disp: 3 each, Rfl: 4  leflunomide 20 MG Oral Tab, Take one daily. (Patient not taking: Reported on 7/13/2023), Disp: 30 tablet, Rfl: 2      lactated ringers infusion, , Intravenous, Continuous        Allergies: No Known Allergies    Past Medical History:   Diagnosis Date    Anxiety state     Back problem     Cataract 08/05/2016    Esophageal reflux     Osteoarthritis     Rheumatoid arthritis (HCC)     TIA (transient ischemic attack)     Visual impairment     reading glasses     Past Surgical History:   Procedure Laterality Date    APPENDECTOMY  9/16/15     BACK SURGERY  09/14/2021     L4-5 extreme lateral interbody fusion with metallic cage and cadaver bone graft through the side, plus L4 inferior L3 laminectomy, foraminotomy, L4-5 discectomy and decompression of the spinal nerves with metallic C4-9 trans facet through the back     Family History   Problem Relation Age of Onset    Arthritis Father     Other (Other) Father         stroke    Other (Other) Mother         stroke     Social History    Tobacco Use      Smoking status: Never      Smokeless tobacco: Never    Alcohol use:  Yes      Alcohol/week: 0.0 standard drinks of alcohol      Comment: social/occ        SYSTEM Check if Review is Normal Check if Physical Exam is Normal If not normal, please explain:   JINNY Hernandez ] [ Benjamin Hernandez ] [ X]    HEART David ] [ Paul Hernandez ] [ Dony Hernandez ] [ Dm Hernandez ] [ Minal Winn I have discussed the risks and benefits and alternatives of the procedure with the patient/family. They understand and agree to proceed with plan of care. I have reviewed the History and Physical done within the last 30 days. Any changes noted above.     Veronica Hardy MD  [facility] - Gastroenterology  10/4/2023

## 2023-10-10 ENCOUNTER — OFFICE VISIT (OUTPATIENT)
Dept: UROLOGY | Facility: HOSPITAL | Age: 78
End: 2023-10-10
Attending: OBSTETRICS & GYNECOLOGY
Payer: MEDICARE

## 2023-10-10 VITALS — TEMPERATURE: 98 F | BODY MASS INDEX: 28.32 KG/M2 | WEIGHT: 170 LBS | HEIGHT: 65 IN

## 2023-10-10 DIAGNOSIS — N39.42 INCONTINENCE WITHOUT SENSORY AWARENESS: Primary | ICD-10-CM

## 2023-10-10 DIAGNOSIS — R35.1 NOCTURIA: ICD-10-CM

## 2023-10-10 DIAGNOSIS — N39.3 STRESS INCONTINENCE: ICD-10-CM

## 2023-10-10 PROCEDURE — 99212 OFFICE O/P EST SF 10 MIN: CPT

## 2023-10-11 ENCOUNTER — OFFICE VISIT (OUTPATIENT)
Dept: ALLERGY | Facility: CLINIC | Age: 78
End: 2023-10-11

## 2023-10-11 ENCOUNTER — NURSE ONLY (OUTPATIENT)
Dept: ALLERGY | Facility: CLINIC | Age: 78
End: 2023-10-11

## 2023-10-11 VITALS
HEART RATE: 61 BPM | SYSTOLIC BLOOD PRESSURE: 144 MMHG | BODY MASS INDEX: 28.32 KG/M2 | OXYGEN SATURATION: 99 % | DIASTOLIC BLOOD PRESSURE: 83 MMHG | HEIGHT: 65 IN | WEIGHT: 170 LBS

## 2023-10-11 DIAGNOSIS — J30.89 ENVIRONMENTAL AND SEASONAL ALLERGIES: Primary | ICD-10-CM

## 2023-10-11 DIAGNOSIS — H10.10 SEASONAL AND PERENNIAL ALLERGIC RHINOCONJUNCTIVITIS: ICD-10-CM

## 2023-10-11 DIAGNOSIS — J30.2 SEASONAL AND PERENNIAL ALLERGIC RHINOCONJUNCTIVITIS: ICD-10-CM

## 2023-10-11 DIAGNOSIS — J30.89 SEASONAL AND PERENNIAL ALLERGIC RHINOCONJUNCTIVITIS: ICD-10-CM

## 2023-10-11 DIAGNOSIS — Z92.29 COVID-19 VACCINE SERIES COMPLETED: ICD-10-CM

## 2023-10-11 DIAGNOSIS — Z23 FLU VACCINE NEED: ICD-10-CM

## 2023-10-11 DIAGNOSIS — K21.00 GASTROESOPHAGEAL REFLUX DISEASE WITH ESOPHAGITIS WITHOUT HEMORRHAGE: ICD-10-CM

## 2023-10-11 DIAGNOSIS — R05.3 CHRONIC COUGH: Primary | ICD-10-CM

## 2023-10-11 PROCEDURE — 95024 IQ TESTS W/ALLERGENIC XTRCS: CPT | Performed by: ALLERGY & IMMUNOLOGY

## 2023-10-11 PROCEDURE — 99204 OFFICE O/P NEW MOD 45 MIN: CPT | Performed by: ALLERGY & IMMUNOLOGY

## 2023-10-11 PROCEDURE — 95004 PERQ TESTS W/ALRGNC XTRCS: CPT | Performed by: ALLERGY & IMMUNOLOGY

## 2023-10-11 RX ORDER — FLUTICASONE PROPIONATE 50 MCG
2 SPRAY, SUSPENSION (ML) NASAL DAILY
Qty: 3 EACH | Refills: 1 | Status: SHIPPED | OUTPATIENT
Start: 2023-10-11

## 2023-10-11 RX ORDER — LEVOCETIRIZINE DIHYDROCHLORIDE 5 MG/1
5 TABLET, FILM COATED ORAL EVERY EVENING
Qty: 90 TABLET | Refills: 1 | Status: SHIPPED | OUTPATIENT
Start: 2023-10-11

## 2023-10-11 NOTE — PROGRESS NOTES
Shemar Lacey is a 69year old female. HPI:   Patient presents with:  Cough: Patient presents for cough which she has had for the past 2 years, cough is wet on and off, denies shortness of breath, denies chest tightness, denies wheezing, denies fever. Took Claritin 2 days ago. Patient is a 67-year-old female who presents for allergy evaluation by referral of her GI Dr. Sarah King  Reason for referral was cough    Allergies   Duration:   1+  years   Timing:  worse in spring   Symptoms: chronic cough,  wet , sz, pnd   Denies wz, ct, sob rn , nc  sinus pain or pressure   Noct cough   Severity: moderate   Status:  no better   Triggers: allergies? Tried: PPI, mucinex liquid ,   Pets or smokers: denies  Priro ent eval   On pred in past thru rheum,  cough no better  No prior pul eval or pfts   No better with ppi  Egd last week   Prior ent eval 7/10 23     Last cxr was  8/2021 reviewed nl       Hx of asthma, ad, or food allergy: denies     Medication list include Flonase. GI notes previous trials of proton pump inhibitor with no significant improvement  Prior video swallow study in 2021 was negative  Prior ENT evaluation in the past    A. Gastric; biopsies:  Mild chronic inactive gastritis. Diff-Quik stain (appropriate control reactivity) shows no definitive evidence of H. pylori-like microorganisms. No evidence of dysplasia or carcinoma identified. B. Esophagus; biopsies:   Fragments of squamous mucosa with features of reflux esophagitis. Minute strips of associated gastric glandular type mucosa with mild chronic inflammation. No evidence of intestinal metaplasia, dysplasia or carcinoma identified. Electronically signed by Miguel Angel Abdul MD on 10/5/2023 at 10:26 AM  Clinical Information   R13.12 Oropharyngeal Dysphagia. R05.9 Cough, Unspecified Type. K21.9 Gastroesophageal Reflux Disease Without Esophagitis. Gross Description   A.   The specimen is received in formalin labeled \"Jeovany gastric biopsies\" and consists of one fragment of pink-tan soft tissue measuring 0.9 x 0.6 x 0.1 cm in greatest dimension. The specimen is submitted entirely in one cassette. B. The specimen is received in formalin labeled \"Thayer, esophagus biopsies\" and consists of multiple fragments of pink-tan soft tissue measuring in aggregate 0.8 x 0.5 x 0.1 cm. The specimen is submitted entirely in one cassette.  ([facility])     Misa Yuan M.D. InterpretationBenign  Electronically signed by Arturo Patterson MD on 10/5/2023 at 10:26 AM  Resulting [facility])         HISTORY:  Past Medical History:   Diagnosis Date    Anxiety state     Back problem     Cataract 08/05/2016    Esophageal reflux     Osteoarthritis     Rheumatoid arthritis (HCC)     TIA (transient ischemic attack)     Visual impairment     reading glasses      Past Surgical History:   Procedure Laterality Date    APPENDECTOMY  9/16/15     BACK SURGERY  09/14/2021     L4-5 extreme lateral interbody fusion with metallic cage and cadaver bone graft through the side, plus L4 inferior L3 laminectomy, foraminotomy, L4-5 discectomy and decompression of the spinal nerves with metallic C0-7 trans facet through the back      Family History   Problem Relation Age of Onset    Arthritis Father     Other (Other) Father         stroke    Other (Other) Mother         stroke      Social History:   Social History     Socioeconomic History    Marital status:    Tobacco Use    Smoking status: Never     Passive exposure: Never    Smokeless tobacco: Never   Vaping Use    Vaping Use: Never used   Substance and Sexual Activity    Alcohol use: Yes     Alcohol/week: 0.0 standard drinks of alcohol     Comment: social/occ    Drug use: No    Sexual activity: Not Currently   Other Topics Concern    Caffeine Concern Yes    Exercise No   Social History Narrative    The patient uses the following assistive device(s):  single-point cane.   temporary    The patient does live in a home with stairs. Medications (Active prior to today's visit):  Current Outpatient Medications   Medication Sig Dispense Refill    fluticasone propionate 50 MCG/ACT Nasal Suspension 2 sprays by Nasal route daily. 3 each 1    levocetirizine 5 MG Oral Tab Take 1 tablet (5 mg total) by mouth every evening. 90 tablet 1    LORazepam 1 MG Oral Tab Take 1 tablet (1 mg total) by mouth daily as needed for Anxiety. 30 tablet 2    ENBREL SURECLICK 50 MG/ML Subcutaneous Solution Auto-injector INJECT 1 PEN (50MG) SUBCUTANEOUSLY ONCE EVERY 7 DAYS 12 each 1    leflunomide 20 MG Oral Tab Take one daily.  (Patient not taking: Reported on 7/13/2023) 30 tablet 2       Allergies:  No Known Allergies      ROS:     Allergic/Immuno:  See HPI  Cardiovascular:  Negative for irregular heartbeat/palpitations, chest pain, edema  Constitutional:  Negative night sweats,weight loss, irritability and lethargy  Endocrine:  Negative for cold intolerance, polydipsia and polyphagia  ENMT:  Negative for ear drainage, hearing loss and nasal drainage  Eyes:  Negative for eye discharge and vision loss  Gastrointestinal:  Negative for abdominal pain, diarrhea and vomiting  Genitourinary:  Negative for dysuria and hematuria  Hema/Lymph:  Negative for easy bleeding and easy bruising  Integumentary:  Negative for pruritus and rash  Musculoskeletal:  Negative for joint symptoms  Neurological:  Negative for dizziness, seizures  Psychiatric:  Negative for inappropriate interaction and psychiatric symptoms  Respiratory:  see hpi       PHYSICAL EXAM:   Constitutional: responsive, no acute distress noted  Head/Face: NC/Atraumatic  Eyes/Vision: conjunctiva and lids are normal extraocular motion is intact   Ears/Audiometry: tympanic membranes are normal bilaterally hearing is grossly intact  Nose/Mouth/Throat: nose and throat are clear mucous membranes are moist   Neck/Thyroid: neck is supple without adenopathy  Lymphatic: no abnormal cervical, supraclavicular or axillary adenopathy is noted  Respiratory: normal to inspection lungs are clear to auscultation bilaterally normal respiratory effort   Cardiovascular: regular rate and rhythm no murmurs, gallups, or rubs  Abdomen: soft non-tender non-distended  Skin/Hair: no unusual rashes present  Extremities: no edema, cyanosis, or clubbing  Neurological:Oriented to time, place, person & situation       ASSESSMENT/PLAN:   Assessment   Chronic cough  (primary encounter diagnosis)  Seasonal and perennial allergic rhinoconjunctivitis  Covid-19 vaccine series completed  Flu vaccine need    Skin testing today to common indoor and outdoor environmental allergies was negative     Patient with positive histamine control    #1 chronic cough  Probably multifactorial.  History of reflux with recent EGD showing reflux esophagitis based upon EGD from last week  Previous trials of pantoprazole did not help with her cough by patient report. See above skin testing to screen for environmental triggers  Patient does report significant postnasal drip  Reviewed allergic rhinitis versus nonallergic rhinitis  Empiric trial of Xyzal, levocetirizine 5 g once a day as an antihistamine  Trial of Flonase 2 sprays per nostril once a day. May take 3 to 5 days to take full effect  See above skin testing to screen for environmental allergic triggers  If not improving over the next week with medications then recommend PFT testing to assess lung function    #2 allergic rhinitis  See above #1    #3 COVID vaccines reviewed. Recommend booster this  new booster available this     #4 flu vaccine recommended this fall high-dose      #5 pneumonia vaccine up-to-date         Orders This Visit:  No orders of the defined types were placed in this encounter. Meds This Visit:  Requested Prescriptions     Signed Prescriptions Disp Refills    fluticasone propionate 50 MCG/ACT Nasal Suspension 3 each 1     Si sprays by Nasal route daily. levocetirizine 5 MG Oral Tab 90 tablet 1     Sig: Take 1 tablet (5 mg total) by mouth every evening. Imaging & Referrals:  None     10/11/2023  Nicholas Treviño MD      If medication samples were provided today, they were provided solely for patient education and training related to self administration of these medications. Teaching, instruction and sample was provided to the patient by myself. Teaching included  a review of potential adverse side effects as well as potential efficacy. Patient's questions were answered in regards to medication administration and dosing and potential side effects.  Teaching was provided via the teach back method

## 2023-10-11 NOTE — PATIENT INSTRUCTIONS
#1 chronic cough  Probably multifactorial.  History of reflux with recent EGD showing reflux esophagitis based upon EGD from last week  Previous trials of pantoprazole did not help with her cough by patient report. See above skin testing to screen for environmental triggers  Patient does report significant postnasal drip  Reviewed allergic rhinitis versus nonallergic rhinitis  Empiric trial of Xyzal, levocetirizine 5 g once a day as an antihistamine  Trial of Flonase 2 sprays per nostril once a day. May take 3 to 5 days to take full effect  See above skin testing to screen for environmental allergic triggers  If not improving over the next week with medications then recommend PFT testing to assess lung function    #2 allergic rhinitis  See above #1    #3 COVID vaccines reviewed. Recommend booster this  new booster available this     #4 flu vaccine recommended this fall high-dose      #5 pneumonia vaccine up-to-date         Orders This Visit:  No orders of the defined types were placed in this encounter. Meds This Visit:  Requested Prescriptions     Signed Prescriptions Disp Refills    fluticasone propionate 50 MCG/ACT Nasal Suspension 3 each 1     Si sprays by Nasal route daily. levocetirizine 5 MG Oral Tab 90 tablet 1     Sig: Take 1 tablet (5 mg total) by mouth every evening.

## 2023-10-30 ENCOUNTER — OFFICE VISIT (OUTPATIENT)
Dept: UROLOGY | Facility: HOSPITAL | Age: 78
End: 2023-10-30
Attending: OBSTETRICS & GYNECOLOGY

## 2023-10-30 VITALS — WEIGHT: 170 LBS | BODY MASS INDEX: 28.32 KG/M2 | HEIGHT: 65 IN

## 2023-10-30 DIAGNOSIS — N39.42 INCONTINENCE WITHOUT SENSORY AWARENESS: Primary | ICD-10-CM

## 2023-10-30 DIAGNOSIS — N39.3 STRESS INCONTINENCE: ICD-10-CM

## 2023-10-30 LAB
BLOOD URINE: NEGATIVE
CONTROL RUN WITHIN 24 HOURS?: YES
LEUKOCYTE ESTERASE URINE: NEGATIVE
NITRITE URINE: NEGATIVE

## 2023-10-30 PROCEDURE — 81002 URINALYSIS NONAUTO W/O SCOPE: CPT

## 2023-10-30 PROCEDURE — 51729 CYSTOMETROGRAM W/VP&UP: CPT

## 2023-10-30 PROCEDURE — 51784 ANAL/URINARY MUSCLE STUDY: CPT

## 2023-10-30 PROCEDURE — 51741 ELECTRO-UROFLOWMETRY FIRST: CPT

## 2023-10-30 PROCEDURE — 51797 INTRAABDOMINAL PRESSURE TEST: CPT

## 2023-10-30 NOTE — PROCEDURES
Patient here for urodynamic testing. Procedure explained and confirmed by patient. See evaluation form for results. Both verbal and written discharge instructions were given. Patient tolerated procedure well and will follow up with Dr. Didier Price on 11/28/23. URODYNAMIC EVALUATION    PATIENT HISTORY:    Prolapse:  No  RADHA:  Yes  UUI:  No; reports incontinence without sensory awareness; \"gushes\" without any urge to go  Nocturia:  4  Frequency:  every 2-3 hours  Sense of Incomplete Emptying:  No; reports shifting at the end of voiding as a habit  Constipation:  No; bowel regimen reviewed and handouts provided  Last void prior to UDS testing:  3 hours  Current urge to void? None  OAB meds stopped prior to test?  NA  Other symptoms? Surgery? [x]  No; open to all options  []  Interested in surgery:   []  Yes, specify date:    Surgical folder provided? []  Yes  [x]  No     PATIENT DIAGNOSIS:  Stress Incontinence N39.3    UDS PROCEDURAL FINDINGS:  Stress Incontinence N39.3 and Detrusor Instability N31.9    MEDICATION: No outpatient medications have been marked as taking for the 10/30/23 encounter (Office Visit) with [facility] PROCEDURE. ALLERGIES:  Patient has no known allergies. EXAM:  Urinalysis Dip:  Today's Results   Component Date Value    control run 10/30/2023 Yes     Blood Urine 10/30/2023 Negative     Nitrite Urine 10/30/2023 Negative     Leukocyte esterase urine 10/30/2023 Negative       Urovesico Junction ( >30 degrees ):  []  Mobile  [x]  Fixed    Perineal Sensation:  [x]  Normal  []  Abnormal    Additional Notes:    PROLAPSE:  []  Yes  [x]  No  Prolapse reduced for testing?   []  Yes  [x]  No  []  Pessary  []  Manual  []  Half Speculum    Additional Notes:    UROFLOWMETRY:  Unreduced  Voided Volume:           57               mL  Maximum Flow Rate:              12               mL/sec  Average flow rate:             5                mL/sec  Post-void Residual:         15 mL  Pattern:  []  Normal  [x]  Poor flow     [x]  Intermittent []  Other  Void:   [x]  Typical  []  Atypical    Additional Notes: Pt instructed to not shift to void for uroflow study. CYSTOMETRY:  Urethral Catheter:  Fr 7 / tdoc  Abd Catheter:     Fr 7 / tdoc   Infusion:  Water Rate 30 mL/min  Temp:  Room  Position:  [x]  Sit  [x]  Stand  []  Supine  First sensation:                   39 mL  First desire to void:                   81 mL  Strong desire to void:                133 mL  Maximum cystometric capacity:    250 mL  Detrusor Activity:  [x]  Unstable   []  Stable  Urge leakage? []  Yes [x]  No  Volume at 1st unhibited detrusor cont:   81 mL  Detrusor instability provoked by:    [x]  Spontaneous []  Coughing  [x]  Filling  []  Valsalva  []  Other    Additional Notes:      URETHRAL FUNCTION:  Valsava (vesical) Leak Point Pressures:    Volume Leak Point Pressure Leak? Cough Valsalva      100mL 264 90    cm H2O []  Yes [x]  No   200mL 238 90    cm H2O []  Yes [x]  No   250mL 220 84    cm H2O []  Yes [x]  No   250mL  standing 211 65   cm H2O [x]  Yes; with cough and valsalva, standing []  No         Genuine Stress Incontinence demonstrated? [x]  Yes, at 250cc, standing, with cough and valsalva, in the absence of an uninhibited contraction []  No    Resting Urethral Pressure Profile:     Functional Urethral Length:         0.8 cm          0.8       cm     Maximum UCP:                58   cm        62                   cm       PRESSURE/FLOW STUDY:  Unreduced  Voided volume:   177     mL  Maximum flow rate:     19 mL/sec  Pressure Detrusor (at maximum flow):     96       cm H2O  Post void residual:  125       mL  Voiding mechanism:  []  Abnormal  [x]  Normal  []  Strain to void   []  Weak detrusor  Void:   [x]  Typical   []  Atypical    Additional Notes:  Uroflowmetry, cystometry, and pressure / flow study were completed using calibrated electronic equipment and air charged transducers.  Pt instructed to not shift to void for flow study.     EMG:  During fill: Reactive    During flow study: Reactive    10/30/2023 11:16 AM     PERFORMED BY:  Libby Simpson RN

## 2023-11-07 ENCOUNTER — HOSPITAL ENCOUNTER (OUTPATIENT)
Dept: MAMMOGRAPHY | Facility: HOSPITAL | Age: 78
Discharge: HOME | End: 2023-11-07
Attending: INTERNAL MEDICINE
Payer: MEDICARE

## 2023-11-07 DIAGNOSIS — Z12.31 BREAST CANCER SCREENING BY MAMMOGRAM: ICD-10-CM

## 2023-11-07 PROCEDURE — 77067 SCR MAMMO BI INCL CAD: CPT | Performed by: INTERNAL MEDICINE

## 2023-11-07 PROCEDURE — 77063 BREAST TOMOSYNTHESIS BI: CPT | Performed by: INTERNAL MEDICINE

## 2023-11-14 ENCOUNTER — HOSPITAL ENCOUNTER (OUTPATIENT)
Dept: ULTRASOUND IMAGING | Facility: HOSPITAL | Age: 78
Discharge: HOME | End: 2023-11-14
Attending: OBSTETRICS & GYNECOLOGY
Payer: MEDICARE

## 2023-11-14 DIAGNOSIS — N39.42 INCONTINENCE WITHOUT SENSORY AWARENESS: ICD-10-CM

## 2023-11-14 PROCEDURE — 76856 US EXAM PELVIC COMPLETE: CPT | Performed by: OBSTETRICS & GYNECOLOGY

## 2023-11-14 PROCEDURE — 76830 TRANSVAGINAL US NON-OB: CPT | Performed by: OBSTETRICS & GYNECOLOGY

## 2023-11-16 ENCOUNTER — TELEPHONE (OUTPATIENT)
Dept: GASTROENTEROLOGY | Facility: CLINIC | Age: 78
End: 2023-11-16

## 2023-11-16 NOTE — TELEPHONE ENCOUNTER
1st,overdue reminder letter mailed out to patient   Imaging order :  Order date: 10/4/2023  XR UGI/ESOPHAGUS DOUBLE CONTRAST (CPT=74246)

## 2023-11-28 ENCOUNTER — OFFICE VISIT (OUTPATIENT)
Dept: UROLOGY | Facility: HOSPITAL | Age: 78
End: 2023-11-28
Attending: OBSTETRICS & GYNECOLOGY
Payer: MEDICARE

## 2023-11-28 VITALS — RESPIRATION RATE: 18 BRPM | BODY MASS INDEX: 28.32 KG/M2 | HEIGHT: 65 IN | WEIGHT: 170 LBS

## 2023-11-28 DIAGNOSIS — N39.41 URGE INCONTINENCE: ICD-10-CM

## 2023-11-28 DIAGNOSIS — N39.42 INCONTINENCE WITHOUT SENSORY AWARENESS: ICD-10-CM

## 2023-11-28 DIAGNOSIS — N88.2 CERVICAL STENOSIS (UTERINE CERVIX): ICD-10-CM

## 2023-11-28 DIAGNOSIS — R93.89 THICKENED ENDOMETRIUM: Primary | ICD-10-CM

## 2023-11-28 PROCEDURE — 99212 OFFICE O/P EST SF 10 MIN: CPT

## 2023-11-28 PROCEDURE — 17250 CHEM CAUT OF GRANLTJ TISSUE: CPT

## 2023-12-12 RX ORDER — MULTIVITAMIN
1 TABLET ORAL DAILY
COMMUNITY

## 2023-12-12 RX ORDER — ACETAMINOPHEN 500 MG
500 TABLET ORAL EVERY 6 HOURS PRN
COMMUNITY

## 2023-12-12 RX ORDER — PANTOPRAZOLE SODIUM 40 MG/1
40 TABLET, DELAYED RELEASE ORAL
COMMUNITY

## 2023-12-12 RX ORDER — MELATONIN
1000 DAILY
COMMUNITY

## 2023-12-15 ENCOUNTER — HOSPITAL ENCOUNTER (OUTPATIENT)
Facility: HOSPITAL | Age: 78
Setting detail: HOSPITAL OUTPATIENT SURGERY
Discharge: HOME | End: 2023-12-15
Attending: OBSTETRICS & GYNECOLOGY | Admitting: OBSTETRICS & GYNECOLOGY
Payer: MEDICARE

## 2023-12-15 ENCOUNTER — ANESTHESIA EVENT (OUTPATIENT)
Dept: SURGERY | Facility: HOSPITAL | Age: 78
End: 2023-12-15
Payer: MEDICARE

## 2023-12-15 ENCOUNTER — ANESTHESIA (OUTPATIENT)
Dept: SURGERY | Facility: HOSPITAL | Age: 78
End: 2023-12-15
Payer: MEDICARE

## 2023-12-15 VITALS
BODY MASS INDEX: 30.32 KG/M2 | SYSTOLIC BLOOD PRESSURE: 165 MMHG | HEIGHT: 65 IN | RESPIRATION RATE: 14 BRPM | TEMPERATURE: 98 F | OXYGEN SATURATION: 99 % | DIASTOLIC BLOOD PRESSURE: 75 MMHG | HEART RATE: 62 BPM | WEIGHT: 182 LBS

## 2023-12-15 PROCEDURE — 0UB98ZX EXCISION OF UTERUS, VIA NATURAL OR ARTIFICIAL OPENING ENDOSCOPIC, DIAGNOSTIC: ICD-10-PCS | Performed by: OBSTETRICS & GYNECOLOGY

## 2023-12-15 PROCEDURE — 88305 TISSUE EXAM BY PATHOLOGIST: CPT | Performed by: OBSTETRICS & GYNECOLOGY

## 2023-12-15 RX ORDER — MORPHINE SULFATE 4 MG/ML
2 INJECTION, SOLUTION INTRAMUSCULAR; INTRAVENOUS EVERY 10 MIN PRN
Status: DISCONTINUED | OUTPATIENT
Start: 2023-12-15 | End: 2023-12-15

## 2023-12-15 RX ORDER — MORPHINE SULFATE 4 MG/ML
4 INJECTION, SOLUTION INTRAMUSCULAR; INTRAVENOUS EVERY 10 MIN PRN
Status: DISCONTINUED | OUTPATIENT
Start: 2023-12-15 | End: 2023-12-15

## 2023-12-15 RX ORDER — METOCLOPRAMIDE 10 MG/1
10 TABLET ORAL ONCE
Status: COMPLETED | OUTPATIENT
Start: 2023-12-15 | End: 2023-12-15

## 2023-12-15 RX ORDER — SODIUM CHLORIDE, SODIUM LACTATE, POTASSIUM CHLORIDE, CALCIUM CHLORIDE 600; 310; 30; 20 MG/100ML; MG/100ML; MG/100ML; MG/100ML
INJECTION, SOLUTION INTRAVENOUS CONTINUOUS
Status: DISCONTINUED | OUTPATIENT
Start: 2023-12-15 | End: 2023-12-15

## 2023-12-15 RX ORDER — NALOXONE HYDROCHLORIDE 0.4 MG/ML
80 INJECTION, SOLUTION INTRAMUSCULAR; INTRAVENOUS; SUBCUTANEOUS AS NEEDED
Status: DISCONTINUED | OUTPATIENT
Start: 2023-12-15 | End: 2023-12-15

## 2023-12-15 RX ORDER — MORPHINE SULFATE 10 MG/ML
6 INJECTION, SOLUTION INTRAMUSCULAR; INTRAVENOUS EVERY 10 MIN PRN
Status: DISCONTINUED | OUTPATIENT
Start: 2023-12-15 | End: 2023-12-15

## 2023-12-15 RX ORDER — HYDROMORPHONE HYDROCHLORIDE 1 MG/ML
0.2 INJECTION, SOLUTION INTRAMUSCULAR; INTRAVENOUS; SUBCUTANEOUS EVERY 5 MIN PRN
Status: DISCONTINUED | OUTPATIENT
Start: 2023-12-15 | End: 2023-12-15

## 2023-12-15 RX ORDER — HYDROMORPHONE HYDROCHLORIDE 1 MG/ML
0.6 INJECTION, SOLUTION INTRAMUSCULAR; INTRAVENOUS; SUBCUTANEOUS EVERY 5 MIN PRN
Status: DISCONTINUED | OUTPATIENT
Start: 2023-12-15 | End: 2023-12-15

## 2023-12-15 RX ORDER — TRAMADOL HYDROCHLORIDE 50 MG/1
50 TABLET ORAL EVERY 6 HOURS PRN
Qty: 10 TABLET | Refills: 0 | Status: SHIPPED | OUTPATIENT
Start: 2023-12-15

## 2023-12-15 RX ORDER — FAMOTIDINE 20 MG/1
20 TABLET, FILM COATED ORAL ONCE
Status: COMPLETED | OUTPATIENT
Start: 2023-12-15 | End: 2023-12-15

## 2023-12-15 RX ORDER — ACETAMINOPHEN 500 MG
1000 TABLET ORAL ONCE
Status: COMPLETED | OUTPATIENT
Start: 2023-12-15 | End: 2023-12-15

## 2023-12-15 RX ORDER — HYDROMORPHONE HYDROCHLORIDE 1 MG/ML
0.4 INJECTION, SOLUTION INTRAMUSCULAR; INTRAVENOUS; SUBCUTANEOUS EVERY 5 MIN PRN
Status: DISCONTINUED | OUTPATIENT
Start: 2023-12-15 | End: 2023-12-15

## 2023-12-15 RX ORDER — ONDANSETRON 2 MG/ML
4 INJECTION INTRAMUSCULAR; INTRAVENOUS EVERY 6 HOURS PRN
Status: DISCONTINUED | OUTPATIENT
Start: 2023-12-15 | End: 2023-12-15

## 2023-12-15 RX ORDER — KETOROLAC TROMETHAMINE 30 MG/ML
INJECTION, SOLUTION INTRAMUSCULAR; INTRAVENOUS AS NEEDED
Status: DISCONTINUED | OUTPATIENT
Start: 2023-12-15 | End: 2023-12-15 | Stop reason: SURG

## 2023-12-15 RX ORDER — METOCLOPRAMIDE HYDROCHLORIDE 5 MG/ML
10 INJECTION INTRAMUSCULAR; INTRAVENOUS EVERY 8 HOURS PRN
Status: DISCONTINUED | OUTPATIENT
Start: 2023-12-15 | End: 2023-12-15

## 2023-12-15 RX ORDER — CEFAZOLIN SODIUM/WATER 2 G/20 ML
SYRINGE (ML) INTRAVENOUS AS NEEDED
Status: DISCONTINUED | OUTPATIENT
Start: 2023-12-15 | End: 2023-12-15 | Stop reason: SURG

## 2023-12-15 RX ADMIN — CEFAZOLIN SODIUM/WATER 2 G: 2 G/20 ML SYRINGE (ML) INTRAVENOUS at 07:45:00

## 2023-12-15 RX ADMIN — KETOROLAC TROMETHAMINE 30 MG: 30 INJECTION, SOLUTION INTRAMUSCULAR; INTRAVENOUS at 08:13:00

## 2023-12-15 NOTE — BRIEF OP NOTE
Pre-Operative Diagnosis: Thickened endometrium, cervical stenosis     Post-Operative Diagnosis: Thickened endometrium, cervical stenosis      Procedure Performed:   Hysteroscopy with truclear    Surgeon(s) and Role:     Adelita Vega MD - Primary    Assistant(s):   None     Surgical Findings: Endometrial polyps      Specimen: Endometrial tissue.      Estimated Blood Loss: No data recorded      Nicole James MD  12/15/2023  8:14 AM

## 2023-12-15 NOTE — ANESTHESIA PROCEDURE NOTES
Airway  Date/Time: 12/15/2023 7:30 AM  Urgency: Elective    Airway not difficult    General Information and Staff    Patient location during procedure: OR  Anesthesiologist: Farhana Jane MD  Performed: anesthesiologist   Performed by: Farhana Jane MD  Authorized by: Farhana Jane MD      Indications and Patient Condition  Indications for airway management: anesthesia  Sedation level: deep  Preoxygenated: yes  Patient position: sniffing  Mask difficulty assessment: 1 - vent by mask    Final Airway Details  Final airway type: supraglottic airway      Successful airway: classic  Size 4       Number of attempts at approach: 1

## 2023-12-15 NOTE — INTERVAL H&P NOTE
Pre-op Diagnosis: Thickened endometrium, cervical stenosis    The above referenced H&P was reviewed by Porsha Adams MD on 12/15/2023, the patient was examined and no significant changes have occurred in the patient's condition since the H&P was performed. I discussed with the patient and/or legal representative the potential benefits, risks and side effects of this procedure; the likelihood of the patient achieving goals; and potential problems that might occur during recuperation. I discussed reasonable alternatives to the procedure, including risks, benefits and side effects related to the alternatives and risks related to not receiving this procedure. We will proceed with procedure as planned.

## 2023-12-15 NOTE — OPERATIVE REPORT
Geo Vides  : 1945  MRN: [id number]  Date of surgery: 12/15/2023  Pre-op diagnosis:  1. Thickened endometrium on ultrasound. 2. Cervical stenosis     Post-operative diagnosis:   1. Thickened endometrium on ultrasound. 2. Cervical stenosis  3. Endometrial polyps    Procedures:  1. Hysteroscopy with dilatation and curettage utilizing Truclear device. Surgeon:  Piedad Fernando M.D. Assistant: None     Location: [place] [address]     Anesthesia: General, via LMA     EBL: 10 mL     Drains: None     Complications: None     Findings: Endometrial polyps. Normal uterine cavity with atrophic appearing endometrium. Pathology: Polyps and endometrial tissue. Description of procedure: The patient was taken to [facility] and general anesthesia was obtained without difficulty. She was placed in dorsal lithotomy position and prepped and draped in normal sterile fashion. Examination under anesthesia confirmed a small, mobile uterus. The bladder was drained at the beginning and at the end of the procedure. A speculum was placed. The cervix was grasped and serially dilated to accommodate the diagnostic hysteroscope. The uterus was sounded to 7 cm. The  hysteroscope was advanced and the endometrial cavity was thoroughly inspected. Bilateral fallopian tube ostia visualized. There were several endometrial polyps. Digital photos obtained. A Truclear device was then introduced and the polyps and endometrial tissue were excised under direct visualization until endometrium was clear and had no excess tissue. Good amount of tissue obtained and sent to pathology. The instruments were removed from the vagina. Hemostasis verified. Fluid deficit was 200 ml. At this point, the procedure was concluded. The patient was awakened from general anesthesia and transferred to the recovery room in stable condition.        Paola Millard MD

## 2023-12-15 NOTE — ANESTHESIA POSTPROCEDURE EVALUATION
Patient: Beto Guardado    Procedure Summary       Date: 12/15/23 Room / Location: [facility] OR [address] [place] [address] OR    Anesthesia Start: 0730 Anesthesia Stop:     Procedure: Hysteroscopy with truclear Diagnosis: (Thickened endometrium, cervical stenosis)    Surgeons: Melony Gutierrez MD Anesthesiologist: Estela Moon MD    Anesthesia Type: general ASA Status: 2            Anesthesia Type: general    Vitals Value Taken Time   /75 12/15/23 0823   Temp 98 12/15/23 0825   Pulse 69 12/15/23 0824   Resp 14 12/15/23 0824   SpO2 95 % 12/15/23 0824   Vitals shown include unfiled device data.     [facility] Post Evaluation:   Patient Evaluated in PACU  Patient Participation: complete - patient participated  Level of Consciousness: awake  Pain Management: adequate  Airway Patency:patent  Dental exam unchanged from preop  Yes    Cardiovascular Status: acceptable  Respiratory Status: acceptable  Postoperative Hydration acceptable      Alana Xie MD  12/15/2023 8:25 AM

## 2023-12-18 NOTE — PROGRESS NOTES
Please let patient know that biopsy was benign. Since we have ruled out malignancy, is she interested in taking medicine from her overactive bladder? If so, OK to prescribe Gemtesa 75 mg daily. We will see how she is doing at her post op visit.

## 2023-12-20 ENCOUNTER — NURSE ONLY (OUTPATIENT)
Dept: INTERNAL MEDICINE CLINIC | Facility: CLINIC | Age: 78
End: 2023-12-20
Payer: MEDICARE

## 2023-12-20 VITALS — SYSTOLIC BLOOD PRESSURE: 122 MMHG | DIASTOLIC BLOOD PRESSURE: 70 MMHG

## 2023-12-20 NOTE — PROGRESS NOTES
Pt had high BP reading pre-procedure and then after procedure as well, was told to follow up with primary. Pt's BP was 122/70 today, will inform Dr Markos Brower.

## 2024-01-02 ENCOUNTER — TELEPHONE (OUTPATIENT)
Dept: UROLOGY | Facility: HOSPITAL | Age: 79
End: 2024-01-02

## 2024-01-02 RX ORDER — VIBEGRON 75 MG/1
1 TABLET, FILM COATED ORAL DAILY
Qty: 30 TABLET | Refills: 11 | Status: SHIPPED | OUTPATIENT
Start: 2024-01-02 | End: 2024-02-01

## 2024-01-02 NOTE — TELEPHONE ENCOUNTER
Phoned patient informing them of normal hysteroscopy pathology results as reviewed by Dr. Koch. Per Dr. Koch, if patient desires OAB medication prior to 6 week post op apt, medication can be ordered, TORB Gemtesa 75mg PO daily, 30 pills, x 11 refills. Patient desires to start medication, ordered to preferred pharmacy. Encouraged to call clinic with questions or concerns. Patient verbalizes understanding and agrees to plan.

## 2024-01-16 ENCOUNTER — LAB ENCOUNTER (OUTPATIENT)
Dept: LAB | Facility: HOSPITAL | Age: 79
End: 2024-01-16
Attending: INTERNAL MEDICINE
Payer: MEDICARE

## 2024-01-16 DIAGNOSIS — Z51.81 ENCOUNTER FOR THERAPEUTIC DRUG MONITORING: ICD-10-CM

## 2024-01-16 DIAGNOSIS — M05.79 SEROPOSITIVE RHEUMATOID ARTHRITIS OF MULTIPLE SITES (HCC): ICD-10-CM

## 2024-01-16 LAB
ALBUMIN SERPL-MCNC: 4.7 G/DL (ref 3.2–4.8)
AST SERPL-CCNC: 23 U/L (ref ?–34)
BASOPHILS # BLD AUTO: 0.04 X10(3) UL (ref 0–0.2)
BASOPHILS NFR BLD AUTO: 0.7 %
CREAT BLD-MCNC: 1.04 MG/DL
CRP SERPL-MCNC: <0.4 MG/DL (ref ?–1)
DEPRECATED RDW RBC AUTO: 40.3 FL (ref 35.1–46.3)
EGFRCR SERPLBLD CKD-EPI 2021: 55 ML/MIN/1.73M2 (ref 60–?)
EOSINOPHIL # BLD AUTO: 0.12 X10(3) UL (ref 0–0.7)
EOSINOPHIL NFR BLD AUTO: 2.1 %
ERYTHROCYTE [DISTWIDTH] IN BLOOD BY AUTOMATED COUNT: 15.1 % (ref 11–15)
ERYTHROCYTE [SEDIMENTATION RATE] IN BLOOD: 51 MM/HR
HCT VFR BLD AUTO: 38.8 %
HGB BLD-MCNC: 12.5 G/DL
IMM GRANULOCYTES # BLD AUTO: 0.01 X10(3) UL (ref 0–1)
IMM GRANULOCYTES NFR BLD: 0.2 %
LYMPHOCYTES # BLD AUTO: 2.22 X10(3) UL (ref 1–4)
LYMPHOCYTES NFR BLD AUTO: 38.2 %
MCH RBC QN AUTO: 24 PG (ref 26–34)
MCHC RBC AUTO-ENTMCNC: 32.2 G/DL (ref 31–37)
MCV RBC AUTO: 74.5 FL
MONOCYTES # BLD AUTO: 0.76 X10(3) UL (ref 0.1–1)
MONOCYTES NFR BLD AUTO: 13.1 %
NEUTROPHILS # BLD AUTO: 2.66 X10 (3) UL (ref 1.5–7.7)
NEUTROPHILS # BLD AUTO: 2.66 X10(3) UL (ref 1.5–7.7)
NEUTROPHILS NFR BLD AUTO: 45.7 %
PLATELET # BLD AUTO: 276 10(3)UL (ref 150–450)
RBC # BLD AUTO: 5.21 X10(6)UL
WBC # BLD AUTO: 5.8 X10(3) UL (ref 4–11)

## 2024-01-16 PROCEDURE — 82565 ASSAY OF CREATININE: CPT

## 2024-01-16 PROCEDURE — 84450 TRANSFERASE (AST) (SGOT): CPT

## 2024-01-16 PROCEDURE — 85025 COMPLETE CBC W/AUTO DIFF WBC: CPT

## 2024-01-16 PROCEDURE — 86140 C-REACTIVE PROTEIN: CPT

## 2024-01-16 PROCEDURE — 36415 COLL VENOUS BLD VENIPUNCTURE: CPT

## 2024-01-16 PROCEDURE — 82040 ASSAY OF SERUM ALBUMIN: CPT

## 2024-01-16 PROCEDURE — 85652 RBC SED RATE AUTOMATED: CPT

## 2024-01-23 ENCOUNTER — OFFICE VISIT (OUTPATIENT)
Dept: UROLOGY | Facility: HOSPITAL | Age: 79
End: 2024-01-23
Attending: OBSTETRICS & GYNECOLOGY
Payer: MEDICARE

## 2024-01-23 VITALS
TEMPERATURE: 99 F | WEIGHT: 182 LBS | SYSTOLIC BLOOD PRESSURE: 120 MMHG | DIASTOLIC BLOOD PRESSURE: 62 MMHG | BODY MASS INDEX: 30.32 KG/M2 | RESPIRATION RATE: 18 BRPM | HEIGHT: 65 IN

## 2024-01-23 DIAGNOSIS — N95.2 VAGINAL ATROPHY: ICD-10-CM

## 2024-01-23 DIAGNOSIS — N39.3 STRESS INCONTINENCE: ICD-10-CM

## 2024-01-23 DIAGNOSIS — R35.1 NOCTURIA: ICD-10-CM

## 2024-01-23 DIAGNOSIS — N39.42 INCONTINENCE WITHOUT SENSORY AWARENESS: Primary | ICD-10-CM

## 2024-01-23 PROBLEM — R93.89 THICKENED ENDOMETRIUM: Status: RESOLVED | Noted: 2023-11-28 | Resolved: 2024-01-23

## 2024-01-23 PROCEDURE — 99212 OFFICE O/P EST SF 10 MIN: CPT

## 2024-01-23 RX ORDER — ESTRADIOL 0.1 MG/G
CREAM VAGINAL
Qty: 42.5 G | Refills: 3 | Status: SHIPPED | OUTPATIENT
Start: 2024-01-23

## 2024-01-23 NOTE — PROGRESS NOTES
Jaz Thayer  8/9/1945 1/23/24    Chief Complaint   Patient presents with    Other     Post opertive state 12/15/2023  Hysteroscopy with D&C         HPI:  78 year old female who is status post hysteroscopy with D&C on 12/15/23. Surgery was indicated secondary to thickened endometrium and cervical stenosis.  Was found to have endometrial polyps. The procedure was uncomplicated.  This is her 6 week post operative exam. She has a baseline history of UUI (reason for initial consult) and was prescribed Gemtesa 75 mg PO daily after surgery.  Returns for follow up.     Pathology:   Endometrial tissue:   Polypoid fragments of benign inactive endometrial glands and stroma, consistent with endometrial polyps.  No malignancy is identified.     She has been taking Gemtesa for the past 2 weeks.  She is voiding every 2-3 hours during the day and nocturia is x 3.  Denies UUI.  Has \"a gush of fluid\" but no all the time.  Some RADHA with cough.  Bowels regular.  Of note, she had urodynamic testing on 11/28/23, which showed 250 ml capacity, +DO with no leaking at 81 ml and RADHA at 250 mL.     Objective:  /62   Temp 98.6 °F (37 °C)   Resp 18   Ht 65\"   Wt 182 lb (82.6 kg)   BMI 30.29 kg/m²     Gen: Alert and oriented, no acute distress  Respiratory: Normal respiratory effort  Abdomen: non-tender, non distended. No rebound or guarding. Normal bowel sounds  Pelvic:  Cough stress test: Negative in the supine position.  External genitalia: Normal for age.   Vagina: +  Atrophy.  No lesions. No cervical bleeding or tenderness.   No abnormal discharge. No prolapse.     Impression:  Encounter Diagnoses   Name Primary?    Stress incontinence Yes    Nocturia     Vaginal atrophy     Incontinence without sensory awareness          Plan:  Recommend to continue Gemtesa 75 mg PO daily.  Start Estrace 1 gram per vagina x 3 per week.  Follow up in 3 months or sooner prn.     Radha Koch MD, FACOG, FACS  Female Pelvic Medicine  and  Reconstructive Surgery (Urogynecology)  [phone] (Pager)

## 2024-01-24 ENCOUNTER — PATIENT MESSAGE (OUTPATIENT)
Dept: OTOLARYNGOLOGY | Facility: CLINIC | Age: 79
End: 2024-01-24

## 2024-01-24 NOTE — TELEPHONE ENCOUNTER
Message sent to patient.    Uriel Sebastian,     I hope all is well.    I want to inform you that Dr. Moe, the referring physician of your sleep study, is on maternity leave.  She will not be back in clinic until around March time.    To get you in the right direction, I would follow up with Dr. Darryl Mccray.  He was the one who has read your results, and said to feel free to come in to speak with you about your results.      If you have any other questions or concerns, please send a [employer] message or call our office at [phone].    Kind Regards,  Maryjane SANDOVAL

## 2024-01-26 ENCOUNTER — OFFICE VISIT (OUTPATIENT)
Dept: INTERNAL MEDICINE CLINIC | Facility: CLINIC | Age: 79
End: 2024-01-26
Payer: MEDICARE

## 2024-01-26 VITALS
BODY MASS INDEX: 30.99 KG/M2 | HEIGHT: 65 IN | OXYGEN SATURATION: 99 % | WEIGHT: 186 LBS | HEART RATE: 75 BPM | DIASTOLIC BLOOD PRESSURE: 68 MMHG | SYSTOLIC BLOOD PRESSURE: 110 MMHG

## 2024-01-26 DIAGNOSIS — R05.3 CHRONIC COUGH: ICD-10-CM

## 2024-01-26 DIAGNOSIS — N18.31 STAGE 3A CHRONIC KIDNEY DISEASE (HCC): ICD-10-CM

## 2024-01-26 DIAGNOSIS — G47.33 OBSTRUCTIVE SLEEP APNEA SYNDROME: Primary | ICD-10-CM

## 2024-01-26 DIAGNOSIS — M05.79 SEROPOSITIVE RHEUMATOID ARTHRITIS OF MULTIPLE SITES (HCC): ICD-10-CM

## 2024-01-26 DIAGNOSIS — N95.1 HOT FLASHES DUE TO MENOPAUSE: ICD-10-CM

## 2024-01-26 DIAGNOSIS — N39.3 STRESS INCONTINENCE OF URINE: ICD-10-CM

## 2024-01-26 PROCEDURE — 99214 OFFICE O/P EST MOD 30 MIN: CPT | Performed by: INTERNAL MEDICINE

## 2024-01-26 RX ORDER — GABAPENTIN 100 MG/1
100 CAPSULE ORAL 3 TIMES DAILY
Qty: 90 CAPSULE | Refills: 3 | Status: SHIPPED | OUTPATIENT
Start: 2024-01-26

## 2024-01-26 NOTE — PROGRESS NOTES
Jazgallo Thayer female 78 year old         Chief Complaint   Patient presents with    Obstructive Sleep Apnea (ANNA MARIE) and hot flashes      Never heard results from sleep clininc   Hot flashes and snoring      Snoring -  is her  concern - discussed sleep study  low  score  -       Discussed hot flashes  for 20 plus years -    New med veozah discussed     Does not sound like  CAD  infection etc - on vaginal estrogen     On  enbrel for  RA     Recent labs show some  ckd       On vibegron for bladder     Chronic cough better    Patient Active Problem List   Diagnosis    Seropositive rheumatoid arthritis of multiple sites (HCC)    Mixed hyperlipidemia    Snores    L4-5 grade 2 spondylolisthesis    L4-5 left moderate-severe, L5-S1 left severe/right moderate foraminal stenosis    L4-5 large central herniated disc with moderate left cephald extruded fragment    L4-5 severe, L3-4 moderate central stenosis    Stress incontinence    Stage 3a chronic kidney disease (HCC)    Incontinence without sensory awareness    Nocturia    Urge incontinence    Cervical stenosis (uterine cervix)    Vaginal atrophy          Current Outpatient Medications on File Prior to Visit   Medication Sig Dispense Refill    estradiol (ESTRACE) 0.1 MG/GM Vaginal Cream Apply 1 gram vaginally 3 times per week. 42.5 g 3    Vibegron (GEMTESA) 75 MG Oral Tab Take 1 tablet by mouth daily. 30 tablet 11    pantoprazole 40 MG Oral Tab EC Take 1 tablet (40 mg total) by mouth every morning before breakfast.      cholecalciferol 25 MCG (1000 UT) Oral Tab Take 1 tablet (1,000 Units total) by mouth daily.      Multiple Vitamin (ONE-DAILY MULTI VITAMINS) Oral Tab Take 1 tablet by mouth daily.      acetaminophen 500 MG Oral Tab Take 1 tablet (500 mg total) by mouth every 6 (six) hours as needed for Pain.      fluticasone propionate 50 MCG/ACT Nasal Suspension 2 sprays by Nasal route daily. 3 each 1    levocetirizine 5 MG Oral Tab Take 1 tablet (5 mg total) by  mouth every evening. 90 tablet 1    LORazepam 1 MG Oral Tab Take 1 tablet (1 mg total) by mouth daily as needed for Anxiety. 30 tablet 2    ENBREL SURECLICK 50 MG/ML Subcutaneous Solution Auto-injector INJECT 1 PEN (50MG) SUBCUTANEOUSLY ONCE EVERY 7 DAYS 12 each 1     No current facility-administered medications on file prior to visit.          Vitals:    01/26/24 1248   BP: 110/68   Pulse: 75   VITALSBody mass index is 30.95 kg/m².    Pertinent findings on the physical exam; air way seems small  rest of exam albert Sebastian was seen today for obstructive sleep apnea (loi).    Diagnoses and all orders for this visit:    Obstructive sleep apnea syndrome  -     PULMONARY - INTERNAL; Future    Hot flashes due to menopause    Stage 3a chronic kidney disease (HCC)    Chronic cough    Seropositive rheumatoid arthritis of multiple sites (HCC)    Stress incontinence of urine    Other orders  -     gabapentin 100 MG Oral Cap; Take 1 capsule (100 mg total) by mouth 3 (three) times daily.           Will  refer for sleep study titration   discussed uvulopasty and dental device     Try gabapentin for  menopause  discussed estrogen if doesn't help  - discussed titration and side effecs     Had  recent  labs - ckd  - saw  urogyn - on meds      Chronic  cough gone        This note was prepared using Dragon Medical voice recognition dictation software and as a result, errors may occur. When identified, these errors have been corrected. While every attempt is made to correct errors during dictation, discrepancies may still exist

## 2024-02-15 ENCOUNTER — TELEPHONE (OUTPATIENT)
Dept: ALLERGY | Facility: CLINIC | Age: 79
End: 2024-02-15

## 2024-02-15 NOTE — TELEPHONE ENCOUNTER
PFT from 10/11/2023 have not been completed.   Letter sent home.   Postponed x 2 months.     Dr. Philip, if PFT has not been completed in that time okay to cancel?

## 2024-02-29 DIAGNOSIS — M05.79 SEROPOSITIVE RHEUMATOID ARTHRITIS OF MULTIPLE SITES (HCC): Primary | ICD-10-CM

## 2024-02-29 RX ORDER — MEDROXYPROGESTERONE ACETATE 150 MG/ML
INJECTION, SUSPENSION INTRAMUSCULAR
Qty: 12 EACH | Refills: 1 | Status: SHIPPED | OUTPATIENT
Start: 2024-02-29

## 2024-03-07 ENCOUNTER — TELEPHONE (OUTPATIENT)
Dept: RHEUMATOLOGY | Facility: CLINIC | Age: 79
End: 2024-03-07

## 2024-03-07 DIAGNOSIS — Z51.81 ENCOUNTER FOR THERAPEUTIC DRUG MONITORING: ICD-10-CM

## 2024-03-07 DIAGNOSIS — M05.79 SEROPOSITIVE RHEUMATOID ARTHRITIS OF MULTIPLE SITES (HCC): Primary | ICD-10-CM

## 2024-03-07 NOTE — TELEPHONE ENCOUNTER
Received fax from [employer] [employer] stating Enbrel is not on formulary. They have provided pt with 30 day supply now.

## 2024-03-07 NOTE — TELEPHONE ENCOUNTER
Phoned pt; confirmed name and . Discussed [place] letter with pt. Inquired if this is new insurance; pt states no, she has had it for 20 years. Pt states had Enbrel last week and was due for a shot yesterday. She is supposed to get a delivery today but nothing has arrived yet. Informed pt we would look further into issue with Carmine.    Rescheduled pt from appt  with Dr Egan to 24 with Dr Villarreal.

## 2024-03-08 NOTE — TELEPHONE ENCOUNTER
Phoned pt; verified name and . Informed her of PA call for Enbrel for 90 day supply and approval received.    Also informed pt lab orders now in the system under Dr Villarreal's name for pt to complete before appt on 24. Pt verbalized agreement with plan.

## 2024-03-08 NOTE — TELEPHONE ENCOUNTER
Phoned [facility] at [phone]; spoke to Fatoumata. Discussed letter received regarding Enbrel. Fatoumata states the issue is not Enbrel but the request to fill for 90 day supply.     Then transferred to PA dept and spoke to Isabelle. Isabelle worked on entering authorization for Enbrel 90 day supply.    PA Approved    Prior authorization for: Enbrel - 90 Day Supply    Medication form: 50mg pen    Date received: 3/8/24    Approval #: [id number]    Approved dates: 12/8/2023 thru 03/08/2025    Pharmacy for medication:     QF-TB results: None on file

## 2024-04-10 ENCOUNTER — TELEPHONE (OUTPATIENT)
Dept: INTERNAL MEDICINE CLINIC | Facility: CLINIC | Age: 79
End: 2024-04-10

## 2024-04-10 ENCOUNTER — LAB ENCOUNTER (OUTPATIENT)
Dept: LAB | Facility: HOSPITAL | Age: 79
End: 2024-04-10
Attending: INTERNAL MEDICINE
Payer: MEDICARE

## 2024-04-10 DIAGNOSIS — Z51.81 ENCOUNTER FOR THERAPEUTIC DRUG MONITORING: ICD-10-CM

## 2024-04-10 DIAGNOSIS — M05.79 SEROPOSITIVE RHEUMATOID ARTHRITIS OF MULTIPLE SITES (HCC): ICD-10-CM

## 2024-04-10 LAB
ALT SERPL-CCNC: 18 U/L
AST SERPL-CCNC: 21 U/L (ref ?–34)
BASOPHILS # BLD AUTO: 0.04 X10(3) UL (ref 0–0.2)
BASOPHILS NFR BLD AUTO: 0.7 %
CREAT BLD-MCNC: 1.1 MG/DL
CRP SERPL-MCNC: <0.4 MG/DL (ref ?–1)
DEPRECATED RDW RBC AUTO: 39.6 FL (ref 35.1–46.3)
EGFRCR SERPLBLD CKD-EPI 2021: 52 ML/MIN/1.73M2 (ref 60–?)
EOSINOPHIL # BLD AUTO: 0.11 X10(3) UL (ref 0–0.7)
EOSINOPHIL NFR BLD AUTO: 2 %
ERYTHROCYTE [DISTWIDTH] IN BLOOD BY AUTOMATED COUNT: 15.3 % (ref 11–15)
ERYTHROCYTE [SEDIMENTATION RATE] IN BLOOD: 39 MM/HR
HCT VFR BLD AUTO: 37.2 %
HGB BLD-MCNC: 11.9 G/DL
IMM GRANULOCYTES # BLD AUTO: 0.01 X10(3) UL (ref 0–1)
IMM GRANULOCYTES NFR BLD: 0.2 %
LYMPHOCYTES # BLD AUTO: 1.86 X10(3) UL (ref 1–4)
LYMPHOCYTES NFR BLD AUTO: 34.4 %
MCH RBC QN AUTO: 23.5 PG (ref 26–34)
MCHC RBC AUTO-ENTMCNC: 32 G/DL (ref 31–37)
MCV RBC AUTO: 73.4 FL
MONOCYTES # BLD AUTO: 0.53 X10(3) UL (ref 0.1–1)
MONOCYTES NFR BLD AUTO: 9.8 %
NEUTROPHILS # BLD AUTO: 2.86 X10 (3) UL (ref 1.5–7.7)
NEUTROPHILS # BLD AUTO: 2.86 X10(3) UL (ref 1.5–7.7)
NEUTROPHILS NFR BLD AUTO: 52.9 %
PLATELET # BLD AUTO: 288 10(3)UL (ref 150–450)
RBC # BLD AUTO: 5.07 X10(6)UL
WBC # BLD AUTO: 5.4 X10(3) UL (ref 4–11)

## 2024-04-10 PROCEDURE — 84450 TRANSFERASE (AST) (SGOT): CPT

## 2024-04-10 PROCEDURE — 36415 COLL VENOUS BLD VENIPUNCTURE: CPT

## 2024-04-10 PROCEDURE — 85652 RBC SED RATE AUTOMATED: CPT

## 2024-04-10 PROCEDURE — 82565 ASSAY OF CREATININE: CPT

## 2024-04-10 PROCEDURE — 85025 COMPLETE CBC W/AUTO DIFF WBC: CPT

## 2024-04-10 PROCEDURE — 84460 ALANINE AMINO (ALT) (SGPT): CPT

## 2024-04-10 PROCEDURE — 86140 C-REACTIVE PROTEIN: CPT

## 2024-04-10 PROCEDURE — 86480 TB TEST CELL IMMUN MEASURE: CPT

## 2024-04-10 NOTE — TELEPHONE ENCOUNTER
Pt is calling stating that would like for dr. Mccray to add a blood order. Pt mention that she talk to dr. Villarreal's and was told if dr. Mccray is able to put in orders for cholesterol.      Please call and advise

## 2024-04-12 LAB
M TB IFN-G CD4+ T-CELLS BLD-ACNC: 0.06 IU/ML
M TB TUBERC IFN-G BLD QL: NEGATIVE
M TB TUBERC IGNF/MITOGEN IGNF CONTROL: >10 IU/ML
QFT TB1 AG MINUS NIL: 0.02 IU/ML
QFT TB2 AG MINUS NIL: 0.01 IU/ML

## 2024-04-18 ENCOUNTER — OFFICE VISIT (OUTPATIENT)
Dept: RHEUMATOLOGY | Facility: CLINIC | Age: 79
End: 2024-04-18
Payer: MEDICARE

## 2024-04-18 ENCOUNTER — LAB ENCOUNTER (OUTPATIENT)
Dept: LAB | Facility: HOSPITAL | Age: 79
End: 2024-04-18
Attending: INTERNAL MEDICINE
Payer: MEDICARE

## 2024-04-18 VITALS
SYSTOLIC BLOOD PRESSURE: 135 MMHG | WEIGHT: 182 LBS | HEIGHT: 65 IN | BODY MASS INDEX: 30.32 KG/M2 | DIASTOLIC BLOOD PRESSURE: 81 MMHG | HEART RATE: 66 BPM

## 2024-04-18 DIAGNOSIS — Z01.89 ENCOUNTER FOR OTHER SPECIFIED SPECIAL EXAMINATIONS: ICD-10-CM

## 2024-04-18 DIAGNOSIS — Z51.81 ENCOUNTER FOR THERAPEUTIC DRUG MONITORING: ICD-10-CM

## 2024-04-18 DIAGNOSIS — M05.79 SEROPOSITIVE RHEUMATOID ARTHRITIS OF MULTIPLE SITES (HCC): ICD-10-CM

## 2024-04-18 DIAGNOSIS — M75.31 CALCIFIC TENDONITIS OF RIGHT SHOULDER: ICD-10-CM

## 2024-04-18 DIAGNOSIS — M05.79 SEROPOSITIVE RHEUMATOID ARTHRITIS OF MULTIPLE SITES (HCC): Primary | ICD-10-CM

## 2024-04-18 LAB
ALT SERPL-CCNC: 18 U/L
AST SERPL-CCNC: 18 U/L (ref ?–34)
BASOPHILS # BLD AUTO: 0.04 X10(3) UL (ref 0–0.2)
BASOPHILS NFR BLD AUTO: 0.7 %
CREAT BLD-MCNC: 1.05 MG/DL
CRP SERPL-MCNC: <0.4 MG/DL (ref ?–1)
DEPRECATED RDW RBC AUTO: 39.8 FL (ref 35.1–46.3)
EGFRCR SERPLBLD CKD-EPI 2021: 55 ML/MIN/1.73M2 (ref 60–?)
EOSINOPHIL # BLD AUTO: 0.13 X10(3) UL (ref 0–0.7)
EOSINOPHIL NFR BLD AUTO: 2.4 %
ERYTHROCYTE [DISTWIDTH] IN BLOOD BY AUTOMATED COUNT: 15 % (ref 11–15)
ERYTHROCYTE [SEDIMENTATION RATE] IN BLOOD: 32 MM/HR
HAV IGM SER QL: NONREACTIVE
HBV CORE IGM SER QL: NONREACTIVE
HBV SURFACE AG SERPL QL IA: NONREACTIVE
HCT VFR BLD AUTO: 37.4 %
HCV AB SERPL QL IA: NONREACTIVE
HGB BLD-MCNC: 12.3 G/DL
IMM GRANULOCYTES # BLD AUTO: 0.01 X10(3) UL (ref 0–1)
IMM GRANULOCYTES NFR BLD: 0.2 %
LYMPHOCYTES # BLD AUTO: 1.92 X10(3) UL (ref 1–4)
LYMPHOCYTES NFR BLD AUTO: 35.8 %
MCH RBC QN AUTO: 24.3 PG (ref 26–34)
MCHC RBC AUTO-ENTMCNC: 32.9 G/DL (ref 31–37)
MCV RBC AUTO: 73.8 FL
MONOCYTES # BLD AUTO: 0.7 X10(3) UL (ref 0.1–1)
MONOCYTES NFR BLD AUTO: 13.1 %
NEUTROPHILS # BLD AUTO: 2.56 X10 (3) UL (ref 1.5–7.7)
NEUTROPHILS # BLD AUTO: 2.56 X10(3) UL (ref 1.5–7.7)
NEUTROPHILS NFR BLD AUTO: 47.8 %
PLATELET # BLD AUTO: 281 10(3)UL (ref 150–450)
RBC # BLD AUTO: 5.07 X10(6)UL
WBC # BLD AUTO: 5.4 X10(3) UL (ref 4–11)

## 2024-04-18 PROCEDURE — 84450 TRANSFERASE (AST) (SGOT): CPT

## 2024-04-18 PROCEDURE — 80074 ACUTE HEPATITIS PANEL: CPT

## 2024-04-18 PROCEDURE — 99204 OFFICE O/P NEW MOD 45 MIN: CPT | Performed by: INTERNAL MEDICINE

## 2024-04-18 PROCEDURE — 85025 COMPLETE CBC W/AUTO DIFF WBC: CPT

## 2024-04-18 PROCEDURE — 84460 ALANINE AMINO (ALT) (SGPT): CPT

## 2024-04-18 PROCEDURE — 85652 RBC SED RATE AUTOMATED: CPT

## 2024-04-18 PROCEDURE — 86140 C-REACTIVE PROTEIN: CPT

## 2024-04-18 PROCEDURE — 36415 COLL VENOUS BLD VENIPUNCTURE: CPT

## 2024-04-18 PROCEDURE — 82565 ASSAY OF CREATININE: CPT

## 2024-04-18 NOTE — PROGRESS NOTES
RHEUMATOLOGY CLINIC- NEW PATIENT    Jaz Thayer is a 78 year old female.    ASSESSMENT/PLAN:       ICD-10-CM    1. Seropositive rheumatoid arthritis of multiple sites (HCC)  M05.79       2. Encounter for therapeutic drug monitoring  Z51.81 Hepatitis Panel, Acute (4)      3. Encounter for other specified special examinations  Z01.89 Hepatitis Panel, Acute (4)      4. Calcific tendonitis of right shoulder  M75.31         DISCUSSION:  Patient with longstanding double seropositive RA well-controlled on weekly Enbrel.  Of note, she did not start the leflunomide prescribed at her last appointment as her symptoms were overall tolerable and only rarely requires current prednisone use.  Discussed with patient given her overall good control of symptoms, how would be reasonable to remain off leflunomide and continue Enbrel.  Patient information handout given regarding leflunomide should her flares become more persistent or frequent.    PLAN:  -Continue Enbrel weekly  - Prior lab work reviewed.  Will add acute hepatitis panel while on Enbrel  - Consult/evaluation communicated with referring physician/provider.    I will MyChart patient on receipt of above results.  Otherwise, patient to follow-up sometime in the fall with repeat labs drawn prior.    Ryan Villarreal DO  2024   11:09 AM    Discussed with patient that they are on chronic treatment with a high-risk medication that requires close lab monitoring for possible drug toxicity.  Additionally, discussed with patient give the possible immunosuppressive effects of their medication as well as their underlying hyper-inflammatory state, the importance of keeping vaccinations and age-appropriate screenings up-to-date, given increased risk of complications and malignancies seen in these patient populations.  Patient to f/u with repeat labs drawn prior (standing labs ordered).  Last QuantiFERON TB testin/10/2024  Last Hepatitis testing: Ordered      HPI:     Chief  Complaint   Patient presents with    Rheumatoid Arthritis    Shoulder Pain    Back Pain       I had the pleasure of seeing Jaz Thayer on 4/18/2024 as a new outpatient consultation for double seropositive rheumatoid arthritis. The patient was originally referred by her PCP.     78 year old female w/ PMH double seropositive rheumatoid arthritis, CKD, HLD, Lumbar DDD who presents to clinic today.Of note, patient formally following with Dr. Okeefe with last appointment 6/16/2023 for follow-up.  As Humira was ineffective, she was previously switched back to Enbrel weekly with improved control of her inflammatory arthritis.  Left shoulder CSI performed with Depo-Medrol during that appointment without complication and she was prescribed a prednisone burst.  Leflunomide was added in addition to the Enbrel.    Patient did not start leflunomide at her last appointment, as her symptoms were relatively well-controlled with a few doses of prednisone and after the shoulder CSI.  At this time, her arthralgias are relatively well-controlled and she only had a minor flare about 1 month ago.  When she does have flares, reports polyarthralgias that can affect her shoulders, hands, ankles or legs.  Only has minimal AM stiffness at this time.    Current Medications:  Enbrel qw     Medication History:    Humira 40 mg every other week-ineffective  P.o. prednisone responsive  Methotrexate 10 mg weekly-ineffective    Leflunomide- prescribed but not started     Interval History:   See Above    HISTORY:  Past Medical History:    Anesthesia complication    difficulty waking up    Anxiety state    Back problem    Cataract    Esophageal reflux    Osteoarthritis    Rheumatoid arthritis (HCC)    TIA (transient ischemic attack)    Visual impairment    reading glasses      Social Hx Reviewed   Family Hx Reviewed     Medications (Active prior to today's visit):  Current Outpatient Medications   Medication Sig Dispense Refill    LORazepam  1 MG Oral Tab Take 1 tablet (1 mg total) by mouth daily as needed for Anxiety. 30 tablet 2    ENBREL SURECLICK 50 MG/ML Subcutaneous Solution Auto-injector INJECT 1 PEN (50MG) SUBCUTANEOUSLY ONCE EVERY 7 DAYS 12 each 1    gabapentin 100 MG Oral Cap Take 1 capsule (100 mg total) by mouth 3 (three) times daily. 90 capsule 3    pantoprazole 40 MG Oral Tab EC Take 1 tablet (40 mg total) by mouth every morning before breakfast.      cholecalciferol 25 MCG (1000 UT) Oral Tab Take 1 tablet (1,000 Units total) by mouth daily.      Multiple Vitamin (ONE-DAILY MULTI VITAMINS) Oral Tab Take 1 tablet by mouth daily.      fluticasone propionate 50 MCG/ACT Nasal Suspension 2 sprays by Nasal route daily. 3 each 1    estradiol (ESTRACE) 0.1 MG/GM Vaginal Cream Apply 1 gram vaginally 3 times per week. (Patient not taking: Reported on 4/18/2024) 42.5 g 3    acetaminophen 500 MG Oral Tab Take 1 tablet (500 mg total) by mouth every 6 (six) hours as needed for Pain.      levocetirizine 5 MG Oral Tab Take 1 tablet (5 mg total) by mouth every evening. (Patient not taking: Reported on 4/18/2024) 90 tablet 1       Allergies:  No Known Allergies      ROS:   Review of Systems   Constitutional:  Negative for chills and fever.   HENT:  Negative for congestion, hearing loss, mouth sores, nosebleeds and trouble swallowing.    Eyes:  Negative for photophobia, pain, redness and visual disturbance.   Respiratory:  Negative for cough and shortness of breath.    Cardiovascular:  Negative for chest pain, palpitations and leg swelling.   Gastrointestinal:  Negative for abdominal pain, blood in stool, diarrhea and nausea.   Endocrine: Negative for cold intolerance and heat intolerance.   Genitourinary:  Negative for dysuria, frequency and hematuria.   Musculoskeletal:  Positive for arthralgias (minimal) and joint swelling (minimal). Negative for back pain, gait problem, myalgias, neck pain and neck stiffness.   Skin:  Negative for color change and rash.    Neurological:  Negative for dizziness, weakness, numbness and headaches.   Psychiatric/Behavioral:  Negative for confusion and dysphoric mood.         PHYSICAL EXAM:     Constitutional:  Well developed, Well nourished, No acute distress  HENT:  Normocephalic, Atraumatic, Bilateral external ears normal, Oropharynx moist, No oral exudates.  Neck: Normal range of motion, No tenderness, Supple, No stridor.  Eyes:  PERRL, EOMI, Conjunctiva normal, No discharge.  Respiratory:  Normal breath sounds, No respiratory distress, No wheezing.  Cardiovascular:  Normal heart rate, Normal rhythm, No murmurs, No rubs, No gallops.  GI:  Bowel sounds normal, Soft, No tenderness, No masses, No pulsatile masses.  : No CVA tenderness.  Musculoskeletal:  A comprehensive 28 count joint exam was done and was negative for swelling or tenderness except as noted. Inspections for misalignment, asymmetry, crepitation, defects, tenderness, masses, nodules, effusions, range of motion, and stability in the upper and lower extremities bilaterally are all normal unless noted.           Integument:  Warm, Dry, No erythema, No rash.  Lymphatic:  No lymphadenopathy noted.  Neurologic:  Alert & oriented x 3, Normal motor function, Normal sensory function, No focal deficits noted.  Psychiatric:  Affect normal, Judgment normal, Mood normal.    LABS:     Prior lab work reviewed and notable for:    4/10/2024:  ALT and AST WNL, CR 1.1 (stable)  CBC with microcytic anemia Hg 11.9, normal WBC, normal PLT  ESR 39 (high), CRP less than 0.4 WNL    TB testing negative    2014:   (high),  (high)    Imagin/4/23 Shoulder XR:   Impression   CONCLUSION:     Mild glenohumeral and acromioclavicular osteoarthritis without acute fracture or dislocation.     Calcific tendinosis of the supraspinatus tendon.

## 2024-04-22 ENCOUNTER — OFFICE VISIT (OUTPATIENT)
Dept: UROLOGY | Facility: HOSPITAL | Age: 79
End: 2024-04-22
Attending: OBSTETRICS & GYNECOLOGY
Payer: MEDICARE

## 2024-04-22 VITALS — BODY MASS INDEX: 30.32 KG/M2 | HEIGHT: 65 IN | RESPIRATION RATE: 18 BRPM | WEIGHT: 182 LBS

## 2024-04-22 DIAGNOSIS — N39.3 STRESS INCONTINENCE: ICD-10-CM

## 2024-04-22 DIAGNOSIS — N39.42 INCONTINENCE WITHOUT SENSORY AWARENESS: Primary | ICD-10-CM

## 2024-04-22 DIAGNOSIS — N39.41 URGE INCONTINENCE: ICD-10-CM

## 2024-04-22 DIAGNOSIS — R35.1 NOCTURIA: ICD-10-CM

## 2024-04-22 PROCEDURE — 99212 OFFICE O/P EST SF 10 MIN: CPT

## 2024-04-22 NOTE — PROGRESS NOTES
Jaz Thayer  8/9/1945 4/22/24    Chief Complaint   Patient presents with    Urge Incontinence       HPI:  78 year old female who is status post hysteroscopy with D&C on 12/15/23. Surgery was indicated secondary to thickened endometrium and cervical stenosis.  Was found to have endometrial polyps. Pathology was benign. She was last seen on 1/23/24 for  6-week post operative exam. She has a baseline history of UUI (reason for initial consult) and was prescribed Gemtesa 75 mg PO daily after surgery with good results.  Recommended to start Estrace vaginal cream for UGA. Returns for follow up.    Urodynamic testing on 11/28/23, which showed 250 ml capacity, +DO with no leaking at 81 ml and RADHA at 250 mL.     Interval history:  She had been taking Gemtesa until about about a week ago.  She did not feel it was helping.  She is voiding ever 3 hours during the day and x 1-2 at night.  Denies RADHA or UUI.  She mostly bothered by \"insensible urine loss\" that does not happen all the time.   No UTI symptoms.  Bowels are regular.  No prolapse symptoms  Only used vaginal estrogen cream but discontinued due to vaginal bleeding.  No more bleeding noted. Denies vaginal discharge.   +       Objective:  Resp 18   Ht 65\"   Wt 182 lb (82.6 kg)   BMI 30.29 kg/m²     General:  Alert and oriented. Well-nourished, normally developed.  Thought and emotional status are appropriate, speech is understandable.  No acute distress.  Pelvic: deferred.     Impression:  Encounter Diagnoses   Name Primary?    Incontinence without sensory awareness Yes    Urge incontinence     Nocturia     Stress incontinence            Plan:  Trial of Myrbetriq 50 mg PO daily (2 month therapeutic trial).  She will contact the office with update  Discontinue Gemtesa  Will not re-start Estrace. We reviewed findings of polyp which was treated at last procedure.  Photos provided.  Agrees to pelvic floor PT referral  Follow up in 4 months or sooner prn.      Radha Koch MD, FACOG, FACS  Female Pelvic Medicine and  Reconstructive Surgery (Urogynecology)  [phone] (Pager)

## 2024-05-03 ENCOUNTER — ORDER TRANSCRIPTION (OUTPATIENT)
Dept: PHYSICAL THERAPY | Facility: HOSPITAL | Age: 79
End: 2024-05-03

## 2024-05-03 ENCOUNTER — OFFICE VISIT (OUTPATIENT)
Dept: PULMONOLOGY | Facility: CLINIC | Age: 79
End: 2024-05-03
Payer: MEDICARE

## 2024-05-03 VITALS
DIASTOLIC BLOOD PRESSURE: 80 MMHG | HEART RATE: 82 BPM | OXYGEN SATURATION: 98 % | WEIGHT: 185 LBS | SYSTOLIC BLOOD PRESSURE: 138 MMHG | HEIGHT: 65 IN | BODY MASS INDEX: 30.82 KG/M2

## 2024-05-03 DIAGNOSIS — N39.42 INCONTINENCE WITHOUT SENSORY AWARENESS: Primary | ICD-10-CM

## 2024-05-03 DIAGNOSIS — G47.33 OBSTRUCTIVE SLEEP APNEA: Primary | ICD-10-CM

## 2024-05-03 PROCEDURE — 99203 OFFICE O/P NEW LOW 30 MIN: CPT | Performed by: PHYSICIAN ASSISTANT

## 2024-05-03 NOTE — PROGRESS NOTES
Pulmonary Consult Note    History of Present Illness:  Jaz Thayer is a 78 year old female presenting to pulmonary clinic today for ANNA MARIE, referred by PCP Dr. Mccray. Home sleep study 10/2023 demonstrated mild ANNA MARIE with combined AHI 5.4 and supine AHI 7.6. BMI at time of sleep study was 30. The patient describes unrefreshing sleep and daytime fatigue. Does not take naps. There is snoring. There is occasional gasping for air. She goes to bed at 12-1 am, has difficulty initiating sleep, and awakens at 10 am. She has chronic difficulty initiating sleep and often lays awake for 2 hours. 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 White noise helps with initiating sleep. Does not watch tv or use electronics in bed. She drinks 3-4 cups of coffee until 3 pm. There is nocturia x3-4 but she is able to fall back to sleep. There are no AM headaches. There is no drowsy driving. There is occasional alcohol use. There is no depression. There is no urge to move the limbs, cataplexy, or hypnagogic hallucinations. She has history of sleep paralysis but this has not occurred for many years.    Past Medical History: ANNA MARIE, RA, HLD, spinal stenosis, CKD, stress urinary incontinence, cervical stenosis, laryngopharyngeal reflux disease    Past Surgical History: L4-5 discectomy and fusion, L3-4 laminectomy, appendectomy    Family Medical History: Mother  with heart disease and stroke, father  with dementia    Social History: Single, has 2 kids, retired [employer]  -Tobacco: Lifelong nonsmoker  -Alcohol: Occasional    Allergies: Patient has no known allergies.     Medications: has a current medication list which includes the following prescription(s): lorazepam, enbrel sureclick, gabapentin, pantoprazole, cholecalciferol, one-daily multi vitamins, and fluticasone propionate.    Review of Systems:   Constitutional: No weight loss or weight gain.  HEENT: No congestion or postnasal drip.  Cardio: No chest  pain.  Respiratory: No shortness of breath.  GI: +Acid reflux.  Extremities: +Occ pain in hands and knees (well controlled at present). No lower extremity swelling.  Neurologic: No headache.  Psych: No depression.     Physical Exam:  /87   Pulse 82   Ht 5' 5\" (1.651 m)   Wt 185 lb (83.9 kg)   SpO2 98%   BMI 30.79 kg/m²    Constitutional: Obese. No acute distress.  HEENT: Head NC/AT. PEERL. No tonsillar or uvula enlargement. Mallampati class 3.  Cardio: Regular rate and rhythm. Normal S1 and S2. No murmurs.   Respiratory: Thorax symmetrical with no labored breathing. Clear to ausculation bilaterally with symmetrical breath sounds. No wheezing, rhonchi, or crackles.   Extremities: No clubbing or cyanosis. No LE edema. No calf tenderness.  Neurologic: A&Ox3. No gross motor deficits.  Skin: Warm, dry.  Lymphatic: No cervical or supraclavicular lymphadenopathy.  Psych: Pleasant affect. Cooperative.    Results:  -Home sleep study 10/2023: Combined AHI 5.4, supine AHI 7.6    Assessment/Plan:  Mild ANNA MARIE with insomnia  Mild ANNA MARIE with AHI 5.4. Symptoms may be more related to difficulty initiating sleep. We discussed sleep hygiene and information was provided. Recommend she significant reduce caffeine intake and increase total sleep time by 1-2 hours. We discussed ANNA MARIE including treatment options (weight loss alone, mandibular advancement therapy, CPAP). She would like to try CPAP.   Plan:   -Sleep hygiene; information provided  -Increase total sleep time by 1-2 hours  -Start APAP  -Weight loss  -Avoid alcohol and sedating drugs  -Never drive if sleepy  -Follow-up in 2-3 months with CPAP data download    Brant Veliz PA-C  Pulmonary Medicine  5/3/2024

## 2024-05-03 NOTE — PATIENT INSTRUCTIONS
[facility]: [phone]    Recommend increasing total sleep time by 1-2 hours    Reduce caffeine intake and recommend no caffeine after noon

## 2024-05-07 ENCOUNTER — TELEPHONE (OUTPATIENT)
Dept: PHYSICAL THERAPY | Facility: HOSPITAL | Age: 79
End: 2024-05-07

## 2024-05-08 ENCOUNTER — OFFICE VISIT (OUTPATIENT)
Dept: PHYSICAL THERAPY | Age: 79
End: 2024-05-08
Attending: OBSTETRICS & GYNECOLOGY
Payer: MEDICARE

## 2024-05-08 DIAGNOSIS — N39.42 INCONTINENCE WITHOUT SENSORY AWARENESS: Primary | ICD-10-CM

## 2024-05-08 PROCEDURE — 97162 PT EVAL MOD COMPLEX 30 MIN: CPT

## 2024-05-08 PROCEDURE — 97112 NEUROMUSCULAR REEDUCATION: CPT

## 2024-05-08 PROCEDURE — 97110 THERAPEUTIC EXERCISES: CPT

## 2024-05-08 NOTE — PROGRESS NOTES
PELVIC FLOOR EVALUATION:   Referring Physician: Dr. Koch  Diagnosis: Incontinence without sensory awareness (N39.42)       Date of onset: chronic Date of Service: 5/8/2024     PATIENT SUMMARY   Jaz Thayer is a 78 year old female  who presents to therapy today with complaints of urinary leakage  Current symptoms include: leakage  History of condition: Patient reports the last couple of years, when she is standing she gushes urine. She has some good days and some bad days. It doesn't smell like urine.  She doesn't feel urge. She feels warmth and can tell she leaks.  She can feel a bubble.  Happens every 2 weeks or so.  This started a few years ago. It has stayed about the same. Taking Mybetriq.  She thinks it is helping. She feels urge and can make it to the toilet.  But then she might just leak all of a sudden.   She has nocturia of 3x.  She doesn't leak at night. Can make it to the bathroom.  She had a D and C for a thickened endometrium. She tried vaginal estradiol but started bleeding. During the day voiding every 2-4 hours.  Not a big water drinker.  Will leak with coughing and sneezing. Typically constipated, takes 2 magnesium pills. No pelvic or abdominal pain. Had lumbar spinal surgery (9/24/21)      Obstetrical/Gynecological history:  Pregnant Now: No  Gravida2/Para2  Complications during pregnancy/delivery: vaginal   Last menstrual period:52 Y/o  Surgical history:Lsp surgery, appendectomy  Urodynamic Test: Yes   Occupation/Activities: retired   PFDI 20    Scores   POPDI 6:    29.17   CRAD 8:    31.25   BRITANY 6:    37.5   Summary:    97.92         Jaz describes prior level of function: no limitations.   Pt goals include stop leakage.  Past medical history was reviewed with Jaz. Significant findings include   Past Medical History:    Anesthesia complication    difficulty waking up    Anxiety state    Back problem    Cataract    Esophageal reflux    Osteoarthritis    Rheumatoid  arthritis (HCC)    TIA (transient ischemic attack)    Visual impairment    reading glasses        Precautions:  None    URINARY HABITS  Types of symptoms: UI without sensory awareness.   Events associated with the onset of urinary complaints: unsure  Abdominal/Vaginal Pressure complaints: No  Urinary Frequency: every 2-4 hours  Urine Stream: normal   Amount: normal   Leaking occurs: unsure, just happens  Episodes of Leakage: 1 times per every 2 weeks  Amount of leakage: gush  Pad use:   Pad Change frequency:   Nocturia: 3x  Fluid Intake: few cups  Bladder irritants:   Urine Stop test: no  Post void dribble: no  Hovering: no  Empty bladder just in case: yes  Do you ever leak urine without knowing it? no    BOWEL HABITS  Types of symptoms: Constipation   Frequency of bowel movements: daily with magnesium pills   Stool consistency: [place] Stool Scale: 3  Do you strain with defecation: Yes   Laxative/Stool softener use: No    ASSESSMENT  Jaz presents to physical therapy evaluation with primary c/o urinary leakage without sensation. She notes onset a few years prior. She had lumbar surgery around the same time.  She also notes chronic constipation.  She loses urine when standing without warning.   The results of the objective tests and measures show decreased awareness of PF, decreased strength and endurance of PF.  Functional deficits include but are not limited to leakage of uine.  Signs and symptoms are consistent with diagnosis of urinary leakage. Pt and PT discussed evaluation findings, pathology, POC and HEP.  Pt voiced understanding and performs HEP correctly without reported pain. Skilled Pelvic Physical Therapy is medically necessary to address the above impairments and reach functional goals.    OBJECTIVE:     Informed consent for internal pelvic evaluation given: Yes-NEXT SESSION    Internal Examination   Mons pubis:   Labia majora:   Labia minora:   Urethral meatus:   Introitus:   Perineal body:      Internal Palpation Left Right Comments   Superficial Transverse perineal      Bulbocavernosus      Ischiocavernosus      Deep Transverse Perineal      Compress Urethrae/Spinc. Urethrovaginalis        Pubococcygeus/Pubovaginalis      Iliococcygeus      Coccygeus      Piriformis      Obturator internus           Pelvic Floor Muscle strength: (PERF= Power/Endurance/Reps/Fast) MMT: ///  External Anal Sphincter:   Accessory Muscle Use:     Diastasis Recti:     Tissue Laxity Test:  Anterior Wall:   Posterior Wall:   Apical:       Today's Treatment and Response:   Patient education was provided on objective findings of external and internal evaluation and expectations with treatment outcomes. Educated on pelvic anatomy and function with diagrams and pelvic model, bladder normatives, adequate hydration levels, proper toileting posture, stress/urge urinary incontinence strategies, diaphragmatic breathing for PNS activation and pelvic floor relaxation , and coordination of diaphragmatic breathing and pelvic floor contraction     Patient was instructed in and issued a HEP for:     Charges: PT Eval Moderate Complexity, 1TE, 1NM      Total Timed Treatment: 30 min     Total Treatment Time: 45 min     Based on clinical rationale and outcome measures, this evaluation involved Moderate Complexity decision making due to 1-2 personal factors/comorbidities, 3 body structures involved/activity limitations, and evolving symptoms including urinary incontinence  PLAN OF CARE:    Goals: (to be met in 10 visits)  Patient instructed on bladder irritants, increased water intake to 64 ounces /day    Patient to utilize proper toileting posture to allow for 5-7 BM per week for improved bowel function.     Patient to report urinary frequency to every 2-4 hours to allow for independent ADLs  Patient instructed on Levator Ani contraction inverse to diaphragmatic breathing to allow for pelvic brace with ADLs without valsalva.     Patient exhibits  an increase in pelvic floor strength fto 4/5  with 10 count hold to allow for pelvic brace with ADLs.    Patient reports a reduction in RADHA from 1x/ every 2 weeks to 0x/ day     Patient reports change in [place] stool #2 to #4 with daily bowel moment without straining and prevention of further pelvic organ prolapse.      Patient understands importance of performing HEP to prevent reoccurrence of symptoms.    Frequency / Duration: Patient will be seen for 1 x/week or a total of 10 visits over a 90 day period.  Treatment will include: Manual Therapy, Mechanical Traction, Neuromuscular Re-education, Self-Care Home Management, Therapeutic Activities, Therapeutic Exercise, and Home Exercise Program instruction     Education or treatment limitation: None  Rehab Potential:good      Patient/Family/Caregiver was advised of these findings, precautions, and treatment options and has agreed to actively participate in planning and for this course of care.    Thank you for your referral. Please co-sign or sign and return this letter via fax as soon as possible to [phone]. If you have any questions, please contact me at Dept: [phone]    Sincerely,  Electronically signed by therapist: Mihaela Wick PT  Physician's certification required: Yes  I certify the need for these services furnished under this plan of treatment and while under my care.    X___________________________________________________ Date____________________    Certification From: 5/8/2024  To:8/6/2024

## 2024-05-13 ENCOUNTER — TELEPHONE (OUTPATIENT)
Dept: PULMONOLOGY | Facility: CLINIC | Age: 79
End: 2024-05-13

## 2024-05-13 NOTE — TELEPHONE ENCOUNTER
Received CPAP setup confirmation from [facility]. Patient due for follow up between 6/11/2024- 8/9/2024.     [employer] message sent to patient informing appointment is needed for insurance purposes and to ensure sleep apnea is being treated properly. Outlined compliance date range. Provided patient with phone number to schedule visit.

## 2024-05-15 ENCOUNTER — TELEPHONE (OUTPATIENT)
Dept: PHYSICAL THERAPY | Facility: HOSPITAL | Age: 79
End: 2024-05-15

## 2024-05-15 ENCOUNTER — APPOINTMENT (OUTPATIENT)
Dept: PHYSICAL THERAPY | Age: 79
End: 2024-05-15
Attending: OBSTETRICS & GYNECOLOGY
Payer: MEDICARE

## 2024-05-22 ENCOUNTER — OFFICE VISIT (OUTPATIENT)
Dept: PHYSICAL THERAPY | Age: 79
End: 2024-05-22
Attending: OBSTETRICS & GYNECOLOGY
Payer: MEDICARE

## 2024-05-22 PROCEDURE — 97112 NEUROMUSCULAR REEDUCATION: CPT

## 2024-05-22 PROCEDURE — 97140 MANUAL THERAPY 1/> REGIONS: CPT

## 2024-05-22 NOTE — PROGRESS NOTES
Dx:   Incontinence without sensory awareness (N39.42)       Insurance (Authorized # of Visits):  Medicare 10 per POC           Authorizing Physician: Dr. August Morse MD visit: none scheduled  Fall Risk: standard         Precautions: n/a           Subjective: Holding bladder more. Tried azo to test if leaking urine and it is not turning urine orange. Using squatty potty.  Leaked all day on Thursday.  Coming down like fluid, not like regular urine. Urine had bubbles.   Objective:   Informed consent for internal pelvic evaluation given: Yes     Internal Examination   Mons pubis: wnl  Labia majora: wnl  Labia minora: wnl  Urethral meatus: wnl  Introitus: wnl  Perineal body: wnl     Internal Palpation Left Right Comments   Superficial Transverse perineal wnl   wnl      Bulbocavernosus  wnl   wnl      Ischiocavernosus  wnl   wnl      Deep Transverse Perineal  wnl  wnl       Compress Urethrae/Spinc. Urethrovaginalis     wnl  wnl       Pubococcygeus/Pubovaginalis  wnl   wnl      Iliococcygeus  wnl   wnl      Coccygeus  wnl   wnl      Piriformis wnl    wnl      Obturator internus wnl   wnl             Pelvic Floor Muscle strength: (PERF= Power/Endurance/Reps/Fast) MMT: 3/3/3/5  External Anal Sphincter: wnl  Accessory Muscle Use: abdominal      Diastasis Recti: not tested     Tissue Laxity Test:  Anterior Wall: min  Posterior Wall: min  Apical: wnl       Assessment: Patient presents with intermittent leakage. Some days with no leakage and some days with. Did not track Bms on these days. She will do this now.  Peformed internal assessment.  Decreased PF strength and endurance noted.     Goals:   (to be met in 10 visits)  Patient instructed on bladder irritants, increased water intake to 64 ounces /day     Patient to utilize proper toileting posture to allow for 5-7 BM per week for improved bowel function.      Patient to report urinary frequency to every 2-4 hours to allow for independent ADLs  Patient instructed on Levator  Ani contraction inverse to diaphragmatic breathing to allow for pelvic brace with ADLs without valsalva.      Patient exhibits an increase in pelvic floor strength fto 4/5  with 10 count hold to allow for pelvic brace with ADLs.     Patient reports a reduction in RADHA from 1x/ every 2 weeks to 0x/ day      Patient reports change in [place] stool #2 to #4 with daily bowel moment without straining and prevention of further pelvic organ prolapse.       Patient understands importance of performing HEP to prevent reoccurrence of symptoms.    Plan:PF strengthening  Date: 5/22/2024  TX#: 2/10 Date:                 TX#: 3/ Date:                 TX#: 4/ Date:                 TX#: 5/ Date:   Tx#: 6/   TherEx:       Manual:  See obj       NeuroMuscular Nichole  PFC  Voiding log       Self care Home management:       HEP: voiding log, PFC    Charges: 1MAN 10 min 2NM 30 min       Total Timed Treatment: 40 min  Total Treatment Time: 45 min

## 2024-05-29 ENCOUNTER — OFFICE VISIT (OUTPATIENT)
Dept: PHYSICAL THERAPY | Age: 79
End: 2024-05-29
Attending: OBSTETRICS & GYNECOLOGY
Payer: MEDICARE

## 2024-05-29 PROCEDURE — 97112 NEUROMUSCULAR REEDUCATION: CPT

## 2024-05-29 PROCEDURE — 97110 THERAPEUTIC EXERCISES: CPT

## 2024-05-29 NOTE — PROGRESS NOTES
Dx: Incontinence without sensory awareness (N39.42)       Insurance (Authorized # of Visits):  Medicare 10 per POC           Authorizing Physician: Dr. Koch  Next MD visit: none scheduled  Fall Risk: standard         Precautions: n/a           Subjective: No pad on right now. Had an issues on Saturday in [place].  Didn't have BM.  Emptied out that night and didn't leak the next day.  Taking magnesium every night.  Drank Pepsi too. Doing PFC everyday.    Objective: 1 day of leakage (No BM for 2 days)     Assessment: Patient presents with one day of leakage after no BM for 2 days. Most likely constipation caused leakage. Initiated PF coordination with breathing.      Goals: (to be met in 10 visits)  Patient instructed on bladder irritants, increased water intake to 64 ounces /day IN PROGRESS     Patient to utilize proper toileting posture to allow for 5-7 BM per week for improved bowel function. IN PROGRESS     Patient to report urinary frequency to every 2-4 hours to allow for independent ADLs IN PROGRESS  Patient instructed on Levator Ani contraction inverse to diaphragmatic breathing to allow for pelvic brace with ADLs without valsalva. IN PROGRESS     Patient exhibits an increase in pelvic floor strength fto 4/5  with 10 count hold to allow for pelvic brace with ADLs.IN PROGRESS     Patient reports a reduction in RADHA from 1x/ every 2 weeks to 0x/ day  IN PROGRESS     Patient reports change in [place] stool #2 to #4 with daily bowel moment without straining and prevention of further pelvic organ prolapse.   IN PROGRESS     Patient understands importance of performing HEP to prevent reoccurrence of symptoms.IN PROGRESS    Plan:PF strengthening  Date: 5/22/2024  TX#: 2/10 Date: 5/29/24               TX#: 3/10 Date:                 TX#: 4/ Date:                 TX#: 5/ Date:   Tx#: 6/   TherEx: TE:  Add sqz 5 sec 20x  Clams 20x  Reverse clams 20x      Manual:  See obj       NeuroMuscular Nichole  PFC  Voiding log NM  ASIM:  Diaphragmatic breathing 20x  Diaphragmatic breathing with PF 20x  STS with PF 10x         Self care Home management:       HEP: PFC     Charges: 2NM 31 min 1 TE 10 min    Total Timed Treatment: 41 min  Total Treatment Time: 43 min

## 2024-06-04 ENCOUNTER — PATIENT MESSAGE (OUTPATIENT)
Dept: INTERNAL MEDICINE CLINIC | Facility: CLINIC | Age: 79
End: 2024-06-04

## 2024-06-04 DIAGNOSIS — F41.9 ANXIETY: ICD-10-CM

## 2024-06-04 RX ORDER — LORAZEPAM 1 MG/1
1 TABLET ORAL
Qty: 30 TABLET | Refills: 2 | Status: SHIPPED | OUTPATIENT
Start: 2024-06-04 | End: 2024-06-05

## 2024-06-04 NOTE — TELEPHONE ENCOUNTER
Pt was here for her appointment today (scheduled as MA Supervisit) however she is not due until after July 20th. Needs refill for upcoming trip.   Requested Prescriptions     Pending Prescriptions Disp Refills    LORazepam 1 MG Oral Tab 30 tablet 2     Sig: Take 1 tablet (1 mg total) by mouth daily as needed for Anxiety.     LAST REFILL DATE 3/4/24   QUANTITY REQUESTED    DAY SUPPLY    DIAGNOSIS    LAST OFFICE VISIT  1/26/24   FOLLOW UP DUE      Future Appointments   Date Time Provider Department Center   6/6/2024 11:30 AM Mihaela Wick, PT [facility]   6/19/2024  6:00 PM Mihaela Wick PT YRKPT [facility]   7/30/2024 12:00 PM Darryl Mccray MD EMMG5 EMMG 5 [facility]   8/6/2024  3:00 PM Brant Veliz PA-C [facility]OPVANESSA [facility]   8/20/2024  2:30 PM Radha Koch MD UROG [facility]   8/29/2024  9:00 AM Brant Veliz PA-C [facility]

## 2024-06-05 RX ORDER — LORAZEPAM 1 MG/1
1 TABLET ORAL EVERY 4 HOURS PRN
Qty: 30 TABLET | Refills: 2 | Status: CANCELLED
Start: 2024-06-05

## 2024-06-05 RX ORDER — LORAZEPAM 1 MG/1
1 TABLET ORAL
Qty: 30 TABLET | Refills: 3 | Status: SHIPPED | OUTPATIENT
Start: 2024-06-05

## 2024-06-05 NOTE — TELEPHONE ENCOUNTER
From: Jaz Thayer  To: Darryl Mccray  Sent: 6/4/2024 6:33 PM CDT  Subject: Lorazepam     Good evening, I requested that my Lorazepam refill be sent to [address] in [place]. You sent it to a pharmacy out of [facility] where I receive my Embrel prescription only. Please make the necessary corrections I am leaving town in a few days and need the Lorazepam. Thank you

## 2024-06-05 NOTE — TELEPHONE ENCOUNTER
Please resend to [facility]  Requested Prescriptions     Pending Prescriptions Disp Refills    LORazepam 1 MG Oral Tab 30 tablet 2     Sig: Take 1 tablet (1 mg total) by mouth daily as needed for Anxiety.     LAST REFILL DATE 6/5/24   QUANTITY REQUESTED    DAY SUPPLY    DIAGNOSIS    LAST OFFICE VISIT     FOLLOW UP DUE      Future Appointments   Date Time Provider Department Center   6/6/2024 11:30 AM Mihaela Wick, PT [facility]   6/19/2024  6:00 PM Mihaela Wick, PT [facility]   7/30/2024 12:00 PM Darryl Mccray MD EMMG5 EMMG 5 [facility]   8/6/2024  3:00 PM Brant Veliz PA-C [facility]UL[facility]   8/20/2024  2:30 PM Radha Koch MD UROG [facility]   8/29/2024  9:00 AM Brant Veliz PA-C [facility]

## 2024-06-06 ENCOUNTER — APPOINTMENT (OUTPATIENT)
Dept: PHYSICAL THERAPY | Age: 79
End: 2024-06-06
Attending: OBSTETRICS & GYNECOLOGY
Payer: MEDICARE

## 2024-06-12 ENCOUNTER — APPOINTMENT (OUTPATIENT)
Dept: PHYSICAL THERAPY | Age: 79
End: 2024-06-12
Attending: OBSTETRICS & GYNECOLOGY
Payer: MEDICARE

## 2024-06-19 ENCOUNTER — APPOINTMENT (OUTPATIENT)
Dept: PHYSICAL THERAPY | Age: 79
End: 2024-06-19
Attending: OBSTETRICS & GYNECOLOGY
Payer: MEDICARE

## 2024-06-19 ENCOUNTER — TELEPHONE (OUTPATIENT)
Dept: PHYSICAL THERAPY | Facility: HOSPITAL | Age: 79
End: 2024-06-19

## 2024-06-21 ENCOUNTER — TELEPHONE (OUTPATIENT)
Dept: UROLOGY | Facility: HOSPITAL | Age: 79
End: 2024-06-21

## 2024-06-21 DIAGNOSIS — N39.41 URGE INCONTINENCE: Primary | ICD-10-CM

## 2024-06-21 RX ORDER — MIRABEGRON 50 MG/1
50 TABLET, EXTENDED RELEASE ORAL DAILY
Qty: 30 TABLET | Refills: 11 | Status: SHIPPED | OUTPATIENT
Start: 2024-06-21 | End: 2025-06-16

## 2024-06-21 NOTE — TELEPHONE ENCOUNTER
Incoming TC from patient requesting refill on Myrbetriq 50mg PO daily. Was ordered a 2 month therapeutic trial at last visit. Patient reports the medication is helpful.    Discussed with KELLY Christy Myrbetriq 50mg PO daily, 30 pills, 11 refills.    Medication sent to patient's preferred pharmacy.    Currently using PFPT.     Confirmed upcoming follow up apt with Dr. Koch, 8/20/24 @ 2:30pm.    Encouraged to call with questions or concerns.

## 2024-06-24 ENCOUNTER — OFFICE VISIT (OUTPATIENT)
Dept: PHYSICAL THERAPY | Age: 79
End: 2024-06-24
Attending: OBSTETRICS & GYNECOLOGY
Payer: MEDICARE

## 2024-06-24 PROCEDURE — 97110 THERAPEUTIC EXERCISES: CPT

## 2024-06-24 PROCEDURE — 97112 NEUROMUSCULAR REEDUCATION: CPT

## 2024-06-24 NOTE — PROGRESS NOTES
Dx: Incontinence without sensory awareness (N39.42)       Insurance (Authorized # of Visits):  Medicare 10 per POC           Authorizing Physician: Dr. August Morse MD visit: none scheduled  Fall Risk: standard         Precautions: n/a           Subjective: Did not use a pad 1 day. Not gushing urine anymore. Could feel a small leak. Doing exercises everyday.    Objective: 1 day of leakage over past few weeks.      Assessment: Patient presents with improving bladder function. One leak over the past few weeks. No leakage otherwise. Progressed PF strengthening and endurance.     Goals: (to be met in 10 visits)  Patient instructed on bladder irritants, increased water intake to 64 ounces /day IN PROGRESS     Patient to utilize proper toileting posture to allow for 5-7 BM per week for improved bowel function. IN PROGRESS     Patient to report urinary frequency to every 2-4 hours to allow for independent ADLs IN PROGRESS  Patient instructed on Levator Ani contraction inverse to diaphragmatic breathing to allow for pelvic brace with ADLs without valsalva. IN PROGRESS     Patient exhibits an increase in pelvic floor strength fto 4/5  with 10 count hold to allow for pelvic brace with ADLs.IN PROGRESS     Patient reports a reduction in RADHA from 1x/ every 2 weeks to 0x/ day  IN PROGRESS     Patient reports change in [place] stool #2 to #4 with daily bowel moment without straining and prevention of further pelvic organ prolapse.   IN PROGRESS     Patient understands importance of performing HEP to prevent reoccurrence of symptoms.IN PROGRESS    Plan:PF strengthening  Date: 5/22/2024  TX#: 2/10 Date: 5/29/24               TX#: 3/10 Date: 6/24/24               TX#: 4/10 Date:                 TX#: 5/ Date:   Tx#: 6/   TherEx: TE:  Add sqz 5 sec 20x  Clams 20x  Reverse clams 20x TE:  Add sqz 5 sec 20x  Clams 20x  Reverse clams 20x     Manual:  See obj       NeuroMuscular Asim  PFC  Voiding log NM ASIM:  Diaphragmatic breathing  20x  Diaphragmatic breathing with PF 20x  STS with PF 10x    NeuroMuscular Nichole  Diaphragmatic breathing with PF 20x  Bridge with exhale 10x     Self care Home management:       HEP: PFC     Charges: 2NM 30 min 1 TE 10 min    Total Timed Treatment: 40 min  Total Treatment Time: 42 min

## 2024-06-27 ENCOUNTER — OFFICE VISIT (OUTPATIENT)
Dept: PULMONOLOGY | Facility: CLINIC | Age: 79
End: 2024-06-27

## 2024-06-27 VITALS
HEART RATE: 75 BPM | OXYGEN SATURATION: 98 % | BODY MASS INDEX: 30.46 KG/M2 | WEIGHT: 182.81 LBS | SYSTOLIC BLOOD PRESSURE: 125 MMHG | DIASTOLIC BLOOD PRESSURE: 79 MMHG | HEIGHT: 65 IN

## 2024-06-27 DIAGNOSIS — G47.33 OBSTRUCTIVE SLEEP APNEA: Primary | ICD-10-CM

## 2024-06-27 PROCEDURE — 99213 OFFICE O/P EST LOW 20 MIN: CPT | Performed by: PHYSICIAN ASSISTANT

## 2024-06-27 NOTE — PROGRESS NOTES
Pulmonary Progress Note    History of Present Illness:  Jaz Thayer is a 78 year old female presenting to pulmonary clinic today for follow-up of ANNA MARIE. Home sleep study 10/2023 demonstrated mild ANNA MARIE with combined AHI 5.4 and supine AHI 7.6. She was last seen May 2024 and reported symptoms of excessive daytime sleepiness, unrefreshing sleep, and frequent nocturia. She opted for trial of APAP 5-12 CWP and is doing very well with CPAP. Sleep is improved and she no longer has nocturnal awakenings. Data download demonstrates average daily usage of 6 hours and 39 minutes with residual respiratory events of 3/h on APAP 5-12 CWP. Usage >4 hours in the last 30 days is 60% and this is because she traveled without her CPAP and occasionally stays overnight with her grandchildren without the CPAP. We discussed compliance.    Review of Systems:   Constitutional: No weight loss or weight gain.  HEENT: No congestion or postnasal drip.  Cardio: No chest pain.  Respiratory: No shortness of breath.  GI: No acid reflux.  Extremities: No lower extremity swelling or pain.   Neurologic: No headache.  Psych: No depression.     Physical Exam:  /79   Pulse 75   Ht 5' 5\" (1.651 m)   Wt 182 lb 12.8 oz (82.9 kg)   SpO2 98%   BMI 30.42 kg/m²    Constitutional: No acute distress.  HEENT: Head NC/AT. PEERL. Crowded oropharynx. Mallampati class 4.  Cardio: Regular rate and rhythm. Normal S1 and S2. No murmurs.   Respiratory: Thorax symmetrical with no labored breathing. Clear to ausculation bilaterally with symmetrical breath sounds. No wheezing, rhonchi, or crackles.   Extremities: No clubbing or cyanosis. No LE edema.  Neurologic: A&Ox3. No gross motor deficits.  Skin: Warm, dry.  Psych: Pleasant affect. Cooperative.    Results:  -Home sleep study 10/2023: Combined AHI 5.4, supine AHI 7.6    Assessment/Plan:  ANNA MARIE  Very mild ANNA MARIE. Initially presented with significant clinical syndrome and opted to trial APAP 5-12 CWP with she has done  very well with. She is benefiting from use. Compliance is slightly low as she traveled without the CPAP. We will recheck data download in 3 weeks.  Plan:  -Vigilance with CPAP every night all night  -Weight loss  -Avoid alcohol  -Avoid sedating drugs  -Never drive if sleepy  -Follow-up at the 1-year interval again with download of data  -Call if new problems in the interim    Brant Veliz PA-C  Pulmonary Medicine  6/27/2024

## 2024-07-03 ENCOUNTER — LAB ENCOUNTER (OUTPATIENT)
Dept: LAB | Facility: HOSPITAL | Age: 79
End: 2024-07-03
Attending: PHYSICIAN ASSISTANT
Payer: MEDICARE

## 2024-07-03 ENCOUNTER — TELEPHONE (OUTPATIENT)
Dept: UROLOGY | Facility: HOSPITAL | Age: 79
End: 2024-07-03

## 2024-07-03 DIAGNOSIS — Z51.81 ENCOUNTER FOR THERAPEUTIC DRUG MONITORING: ICD-10-CM

## 2024-07-03 DIAGNOSIS — N39.41 URGE INCONTINENCE: Primary | ICD-10-CM

## 2024-07-03 DIAGNOSIS — M05.79 SEROPOSITIVE RHEUMATOID ARTHRITIS OF MULTIPLE SITES (HCC): ICD-10-CM

## 2024-07-03 DIAGNOSIS — N39.41 URGE INCONTINENCE: ICD-10-CM

## 2024-07-03 LAB
ALT SERPL-CCNC: 15 U/L
AST SERPL-CCNC: 19 U/L (ref ?–34)
BASOPHILS # BLD AUTO: 0.04 X10(3) UL (ref 0–0.2)
BASOPHILS NFR BLD AUTO: 0.7 %
CREAT BLD-MCNC: 0.97 MG/DL
CRP SERPL-MCNC: <0.4 MG/DL (ref ?–1)
DEPRECATED RDW RBC AUTO: 41.6 FL (ref 35.1–46.3)
EGFRCR SERPLBLD CKD-EPI 2021: 60 ML/MIN/1.73M2 (ref 60–?)
EOSINOPHIL # BLD AUTO: 0.19 X10(3) UL (ref 0–0.7)
EOSINOPHIL NFR BLD AUTO: 3.6 %
ERYTHROCYTE [DISTWIDTH] IN BLOOD BY AUTOMATED COUNT: 15.3 % (ref 11–15)
ERYTHROCYTE [SEDIMENTATION RATE] IN BLOOD: 47 MM/HR
HCT VFR BLD AUTO: 36.7 %
HGB BLD-MCNC: 11.6 G/DL
IMM GRANULOCYTES # BLD AUTO: 0.02 X10(3) UL (ref 0–1)
IMM GRANULOCYTES NFR BLD: 0.4 %
LYMPHOCYTES # BLD AUTO: 1.72 X10(3) UL (ref 1–4)
LYMPHOCYTES NFR BLD AUTO: 32.2 %
MCH RBC QN AUTO: 23.8 PG (ref 26–34)
MCHC RBC AUTO-ENTMCNC: 31.6 G/DL (ref 31–37)
MCV RBC AUTO: 75.4 FL
MONOCYTES # BLD AUTO: 0.65 X10(3) UL (ref 0.1–1)
MONOCYTES NFR BLD AUTO: 12.2 %
NEUTROPHILS # BLD AUTO: 2.72 X10 (3) UL (ref 1.5–7.7)
NEUTROPHILS # BLD AUTO: 2.72 X10(3) UL (ref 1.5–7.7)
NEUTROPHILS NFR BLD AUTO: 50.9 %
PLATELET # BLD AUTO: 258 10(3)UL (ref 150–450)
RBC # BLD AUTO: 4.87 X10(6)UL
WBC # BLD AUTO: 5.3 X10(3) UL (ref 4–11)

## 2024-07-03 PROCEDURE — 86140 C-REACTIVE PROTEIN: CPT

## 2024-07-03 PROCEDURE — 85652 RBC SED RATE AUTOMATED: CPT

## 2024-07-03 PROCEDURE — 82565 ASSAY OF CREATININE: CPT

## 2024-07-03 PROCEDURE — 84450 TRANSFERASE (AST) (SGOT): CPT

## 2024-07-03 PROCEDURE — 36415 COLL VENOUS BLD VENIPUNCTURE: CPT

## 2024-07-03 PROCEDURE — 85025 COMPLETE CBC W/AUTO DIFF WBC: CPT

## 2024-07-03 PROCEDURE — 87086 URINE CULTURE/COLONY COUNT: CPT

## 2024-07-03 PROCEDURE — 84460 ALANINE AMINO (ALT) (SGPT): CPT

## 2024-07-03 RX ORDER — NITROFURANTOIN 25; 75 MG/1; MG/1
100 CAPSULE ORAL 2 TIMES DAILY
Qty: 14 CAPSULE | Refills: 0 | Status: SHIPPED | OUTPATIENT
Start: 2024-07-03

## 2024-07-03 NOTE — TELEPHONE ENCOUNTER
TC from pt c/o worsening UUI.   Had positive improvements with therapeutic trial of myrbetriq for 2 months.   Ran out, then started generic mirabegron a few days later, which she has been taking for 1.5 weeks.   Discussed with Suellen Luz PA-C, TORROSIE urine cx to r/o infection. Empiric NTF 100mg po bid x 7d. Continue myrbetriq as prescribed.  Follow up as scheduled.  Will follow culture.  Pt understands and agrees to plan.

## 2024-07-08 ENCOUNTER — TELEPHONE (OUTPATIENT)
Dept: PULMONOLOGY | Facility: CLINIC | Age: 79
End: 2024-07-08

## 2024-07-08 ENCOUNTER — TELEPHONE (OUTPATIENT)
Dept: UROLOGY | Facility: HOSPITAL | Age: 79
End: 2024-07-08

## 2024-07-08 NOTE — TELEPHONE ENCOUNTER
Outgoing call to Jaz with no answer so detailed message left.  Message to informed Jaz that the urine culture from 7/3/24 showed contamination so no treatment is necessary.  Pt to stop empiric Macrobid .  Call back number left for patient if she has any questions or concerns.

## 2024-07-08 NOTE — TELEPHONE ENCOUNTER
Received fax from [facility] with order for PAP supplies. Placed in Brant MILLER's folder for signature

## 2024-07-08 NOTE — TELEPHONE ENCOUNTER
Order signed by Dr. Mckeon and faxed back to [facility] with confirmation. Copy placed in the E folder for  and order sent for scanning.

## 2024-07-12 ENCOUNTER — TELEPHONE (OUTPATIENT)
Dept: PULMONOLOGY | Facility: CLINIC | Age: 79
End: 2024-07-12

## 2024-08-06 DIAGNOSIS — M05.79 SEROPOSITIVE RHEUMATOID ARTHRITIS OF MULTIPLE SITES (HCC): ICD-10-CM

## 2024-08-06 RX ORDER — MEDROXYPROGESTERONE ACETATE 150 MG/ML
INJECTION, SUSPENSION INTRAMUSCULAR
Qty: 4 EACH | Refills: 2 | Status: SHIPPED | OUTPATIENT
Start: 2024-08-06

## 2024-08-06 NOTE — TELEPHONE ENCOUNTER
Requested Prescriptions     Pending Prescriptions Disp Refills    ENBREL SURECLICK 50 MG/ML Subcutaneous Solution Auto-injector [Pharmacy Med Name: ENBREL SURECLICK PF AUTOINJ 50MG/ML]  1     Sig: INJECT 1 PEN UNDER THE SKIN EVERY 7 DAYS     LOV: 4/18/24  Future Appointments   Date Time Provider Department Center   8/13/2024 10:20 AM Darryl Mccray MD EMMG5 E[facility] 5 [facility]   8/20/2024  2:30 PM Radha Koch MD UROG [facility]     Labs:   Component      Latest Ref Rng 7/3/2024   WBC      4.0 - 11.0 x10(3) uL 5.3    RBC      3.80 - 5.30 x10(6)uL 4.87    Hemoglobin      12.0 - 16.0 g/dL 11.6 (L)    Hematocrit      35.0 - 48.0 % 36.7    MCV      80.0 - 100.0 fL 75.4 (L)    MCH      26.0 - 34.0 pg 23.8 (L)    MCHC      31.0 - 37.0 g/dL 31.6    RDW-SD      35.1 - 46.3 fL 41.6    RDW      11.0 - 15.0 % 15.3 (H)    Platelet Count      150.0 - 450.0 10(3)uL 258.0    Prelim Neutrophil Abs      1.50 - 7.70 x10 (3) uL 2.72    Neutrophils Absolute      1.50 - 7.70 x10(3) uL 2.72    Lymphocytes Absolute      1.00 - 4.00 x10(3) uL 1.72    Monocytes Absolute      0.10 - 1.00 x10(3) uL 0.65    Eosinophils Absolute      0.00 - 0.70 x10(3) uL 0.19    Basophils Absolute      0.00 - 0.20 x10(3) uL 0.04    Immature Granulocyte Absolute      0.00 - 1.00 x10(3) uL 0.02    Neutrophils %      % 50.9    Lymphocytes %      % 32.2    Monocytes %      % 12.2    Eosinophils %      % 3.6    Basophils %      % 0.7    Immature Granulocyte %      % 0.4    CREATININE      0.55 - 1.02 mg/dL 0.97    EGFR      >=60 mL/min/1.73m2 60    ALT (SGPT)      10 - 49 U/L 15    AST (SGOT)      <=34 U/L 19    C-REACTIVE PROTEIN      <1.00 mg/dL <0.40    SED RATE      0 - 30 mm/Hr 47 (H)       Legend:  (L) Low  (H) High       PLAN:  -Continue Enbrel weekly  - Prior lab work reviewed.  Will add acute hepatitis panel while on Enbrel  - Consult/evaluation communicated with referring physician/provider.     I will MyChart patient on receipt of above results.  Otherwise,  patient to follow-up sometime in the fall with repeat labs drawn prior.     Ryan Villarreal DO  4/18/2024   11:09 AM

## 2024-08-13 ENCOUNTER — OFFICE VISIT (OUTPATIENT)
Dept: INTERNAL MEDICINE CLINIC | Facility: CLINIC | Age: 79
End: 2024-08-13
Payer: MEDICARE

## 2024-08-13 VITALS
HEART RATE: 76 BPM | OXYGEN SATURATION: 94 % | HEIGHT: 65 IN | DIASTOLIC BLOOD PRESSURE: 78 MMHG | WEIGHT: 182 LBS | BODY MASS INDEX: 30.32 KG/M2 | SYSTOLIC BLOOD PRESSURE: 136 MMHG

## 2024-08-13 DIAGNOSIS — N39.3 STRESS INCONTINENCE: ICD-10-CM

## 2024-08-13 DIAGNOSIS — N39.41 URGE INCONTINENCE: ICD-10-CM

## 2024-08-13 DIAGNOSIS — M43.16 SPONDYLOLISTHESIS OF LUMBAR REGION: ICD-10-CM

## 2024-08-13 DIAGNOSIS — E78.2 MIXED HYPERLIPIDEMIA: ICD-10-CM

## 2024-08-13 DIAGNOSIS — N95.2 VAGINAL ATROPHY: ICD-10-CM

## 2024-08-13 DIAGNOSIS — N18.31 STAGE 3A CHRONIC KIDNEY DISEASE (HCC): ICD-10-CM

## 2024-08-13 DIAGNOSIS — R03.0 ELEVATED BP WITHOUT DIAGNOSIS OF HYPERTENSION: ICD-10-CM

## 2024-08-13 DIAGNOSIS — M48.062 SPINAL STENOSIS OF LUMBAR REGION WITH NEUROGENIC CLAUDICATION: ICD-10-CM

## 2024-08-13 DIAGNOSIS — M05.79 SEROPOSITIVE RHEUMATOID ARTHRITIS OF MULTIPLE SITES (HCC): Primary | ICD-10-CM

## 2024-08-13 DIAGNOSIS — G47.33 OBSTRUCTIVE SLEEP APNEA SYNDROME: ICD-10-CM

## 2024-08-13 PROBLEM — R35.1 NOCTURIA: Status: RESOLVED | Noted: 2023-10-10 | Resolved: 2024-08-13

## 2024-08-13 PROBLEM — R06.83 SNORES: Status: RESOLVED | Noted: 2020-12-19 | Resolved: 2024-08-13

## 2024-08-13 PROBLEM — N39.42 INCONTINENCE WITHOUT SENSORY AWARENESS: Status: RESOLVED | Noted: 2023-10-10 | Resolved: 2024-08-13

## 2024-08-13 PROCEDURE — G0439 PPPS, SUBSEQ VISIT: HCPCS | Performed by: INTERNAL MEDICINE

## 2024-08-13 PROCEDURE — 99213 OFFICE O/P EST LOW 20 MIN: CPT | Performed by: INTERNAL MEDICINE

## 2024-08-13 NOTE — PROGRESS NOTES
Jaz Thayer is a 79 year old  who presents for a Medicare Subsequent Annual Wellness visit (Pt already had Initial Annual Wellness)    Discussed medicare wellness , reviewed assessments and health maintenance for screening and immunizations.    In addition medical issues  addressed today included ;RA  doing  well , likes  gabapentin for   menopause and   athritis     Discussed bladder   and  incont , very happy with Myrbetriq and seeing Dr. August asher discussed using nonsteroidals.  I recommended against it due to CKD    No  cp or  son , no GI  issues        Allergies:   has No Known Allergies.  Medical History:    has a past medical history of Anesthesia complication (2021), Anxiety state, Back problem, Cataract (08/05/2016), Esophageal reflux, Osteoarthritis, Rheumatoid arthritis (HCC), TIA (transient ischemic attack), and Visual impairment.  Surgical History:    has a past surgical history that includes appendectomy (09/16/2015); back surgery (09/14/2021); and upper gi endoscopy,exam.   Family History:   family history includes Arthritis in her father; Heart Disorder in her mother; Other in her father, mother, and sister.  Social History:    reports that she has never smoked. She has never been exposed to tobacco smoke. She has never used smokeless tobacco. She reports current alcohol use. She reports that she does not use drugs.        Fall Risk Assessment:   She has been screened for Falls and is low risk.      Cognitive Assessment:   She had a completely normal cognitive assessment - see flowsheet entries     Functional Ability/Status:   Jaz Thayer has a completely normal functional assessment. See flowsheet for details.        Depression Screening (PHQ-2/PHQ-9): PHQ-2 SCORE: 0  , done 8/13/2024          Advanced Directives:   DNR - no heroic measures  if no reversible  issues     Tobacco:  She has never smoked tobacco.    CAGE Alcohol Screen:   CAGE screening score of 0 on  8/9/2024, showing low risk of alcohol abuse.          Patient Care Team:  Darryl Mccray MD as PCP - General (Internal Medicine)  Caity Kwong, PT as Physical Therapist (Physical Therapy)  Brunner, Heather, PT as Physical Therapist (Physical Therapy)  Mihaela Wick, PT as Physical Therapist (Physical Therapy)          Objective:   /78   Pulse 76   Ht 5' 5\" (1.651 m)   Wt 182 lb (82.6 kg)   SpO2 94%   BMI 30.29 kg/m²    Estimated body mass index is 30.29 kg/m² as calculated from the following:    Height as of this encounter: 5' 5\" (1.651 m).    Weight as of this encounter: 182 lb (82.6 kg).      She looks great -young looking for her age    Head/neck ; nl     Lungs: Clear     Heart: Regular    Abdomen soft nontender    Ext; nl -no significant rheumatological inflammation noted    Neurological: No focal deficit, nl cognition         Medicare Hearing Assessment:  Hearing Screening    Time taken: 8/13/2024 10:48 AM  Entry User: Darryl Mccray MD  Screening Method: Finger Rub  Finger Rub Result: Pass       Visual Acuity:   Visual Acuity:   Right Eye Visual Acuity: Uncorrected Right Eye Chart Acuity: 20/70   Left Eye Visual Acuity: Uncorrected Left Eye Chart Acuity: 20/70   Both Eyes Visual Acuity: Uncorrected Both Eyes Chart Acuity: 20/70   Able To Tolerate Visual Acuity: Yes        Current Outpatient Medications:     ENBREL SURECLICK 50 MG/ML Subcutaneous Solution Auto-injector, INJECT 1 PEN UNDER THE SKIN EVERY 7 DAYS, Disp: 4 each, Rfl: 2    Mirabegron ER (MYRBETRIQ) 50 MG Oral Tablet 24 Hr, Take 1 tablet (50 mg total) by mouth daily., Disp: 30 tablet, Rfl: 11    LORazepam 1 MG Oral Tab, Take 1 tablet (1 mg total) by mouth daily as needed for Anxiety., Disp: 30 tablet, Rfl: 3    gabapentin 100 MG Oral Cap, Take 1 capsule (100 mg total) by mouth 3 (three) times daily., Disp: 90 capsule, Rfl: 3    pantoprazole 40 MG Oral Tab EC, Take 1 tablet (40 mg total) by mouth every morning before breakfast.,  Disp: , Rfl:     cholecalciferol 25 MCG (1000 UT) Oral Tab, Take 1 tablet (1,000 Units total) by mouth daily., Disp: , Rfl:     Multiple Vitamin (ONE-DAILY MULTI VITAMINS) Oral Tab, Take 1 tablet by mouth daily., Disp: , Rfl:     fluticasone propionate 50 MCG/ACT Nasal Suspension, 2 sprays by Nasal route daily., Disp: 3 each, Rfl: 1     ASSESSMENT  and   PLAN    Medicare wellness  Dicussed prevention screening test and immunization for age  Reinforced healthy diet, lifestyle, and exercise. Discussed current state of health.    Medical issues     1. Seropositive rheumatoid arthritis of multiple sites (HCC) (Primary)-currently doing well.  Continue Enbrel.  Discussed not using nonsteroidals.  Has no extra arthritic manifestations.  2. Mixed hyperlipidemia-last LDL was 140 currently not on a statin.  Will check labs next visit.  Not sure we would start a statin at 79 for primary prevention has no known heart disease.  3. Stress incontinence-improved with pelvic floor therapy and Myrbetriq.  4. Stage 3a chronic kidney disease (HCC)-discussed the diagnosis and avoiding nephrotoxic drugs.  She understands and agrees  5. Obstructive sleep apnea syndrome-recently diagnosed and now using CPAP.  Likes how she feels  6. Vaginal atrophy-using estrogen.  7. Urge incontinence-has improved.  Sounds like your urge incontinence is a bigger manifestation than stress.  8. L4-5 severe, L3-4 moderate central stenosis-currently stable  9. L4-5 grade 2 spondylolisthesis-currently stable has history of surgery in the past.  Thinks gabapentin may be helping this as well as her menopause symptoms      Pressure slightly elevated discussed following up on this in 4 months.          No follow-ups on file.     Darryl Mccray MD      General Health:  In the past six months, have you lost more than 10 pounds without trying?: 2 - No  Has your appetite been poor?: No  Type of Diet: Balanced  How does the patient maintain a good energy level?:  Appropriate Exercise  How would you describe your daily physical activity?: Moderate  How would you describe your current health state?: Good  How do you maintain positive mental well-being?: Social Interaction;Puzzles;Games;Visiting Friends;Visiting Family  On a scale of 0 to 10, with 0 being no pain and 10 being severe pain, what is your pain level?: 5 - (Moderate)  In the past six months, have you experienced urine leakage?: 1-Yes  At any time do you feel concerned for the safety/well-being of yourself and/or your children, in your home or elsewhere?: No  Have you had any immunizations at another office such as Influenza, Hepatitis B, Tetanus, or Pneumococcal?: Yes       Jaz Thayer's SCREENING SCHEDULE   Tests on this list are recommended by your physician but may not be covered, or covered at this frequency, by your insurer.   Please check with your insurance carrier before scheduling to verify coverage.   PREVENTATIVE SERVICES FREQUENCY &  COVERAGE DETAILS LAST COMPLETION DATE   Diabetes Screening    Fasting Blood Sugar /  Glucose    One screening every 12 months if never tested or if previously tested but not diagnosed with pre-diabetes   One screening every 6 months if diagnosed with pre-diabetes Lab Results   Component Value Date     (H) 07/20/2023        Cardiovascular Disease Screening    Lipid Panel  Cholesterol  Lipoprotein (HDL)  Triglycerides Covered every 5 years for all Medicare beneficiaries without apparent signs or symptoms of cardiovascular disease Lab Results   Component Value Date    CHOLEST 200 (H) 07/20/2023    HDL 45 07/20/2023     (H) 07/20/2023    TRIG 82 07/20/2023         Electrocardiogram (EKG)   Covered if needed at Welcome to Medicare, and non-screening if indicated for medical reasons 08/21/2021      Ultrasound Screening for Abdominal Aortic Aneurysm (AAA) Covered once in a lifetime for one of the following risk factors    Men who are 65-75 years old and have  ever smoked    Anyone with a family history -     Colorectal Cancer Screening  Covered for ages 50-85; only need ONE of the following:    Colonoscopy   Covered every 10 years    Covered every 2 years if patient is at high risk or previous colonoscopy was abnormal -    Health Maintenance   Topic Date Due    Colorectal Cancer Screening  Discontinued       Flexible Sigmoidoscopy   Covered every 4 years -    Fecal Occult Blood Test Covered annually -   Bone Density Screening    Bone density screening    Covered every 2 years after age 65 if diagnosed with risk of osteoporosis or estrogen deficiency.    Covered yearly for long-term glucocorticoid medication use (Steroids) Last Dexa Scan:    XR DEXA BONE DENSITOMETRY (CPT=77080) 07/02/2021      No recommendations at this time   Pap and Pelvic    Pap   Covered every 2 years for women at normal risk; Annually if at high risk -  No recommendations at this time    Chlamydia Annually if high risk -  No recommendations at this time   Screening Mammogram    Mammogram     Recommend annually for all female patients aged 40 and older    One baseline mammogram covered for patients aged 35-39 11/07/2023    Health Maintenance   Topic Date Due    Mammogram  Discontinued       Immunizations    Influenza Covered once per flu season  Please get every year 11/08/2023  No recommendations at this time    Pneumococcal Each vaccine (Hjudcvh41 & [id number]) covered once after 65 Prevnar 13: 12/04/2019    Evrwibozv34: 09/19/2018     No recommendations at this time    Hepatitis B One screening covered for patients with certain risk factors   -  No recommendations at this time    Tetanus Toxoid Not covered by Medicare Part B unless medically necessary (cut with metal); may be covered with your pharmacy prescription benefits -    Tetanus, Diptheria and Pertusis TD and TDaP Not covered by Medicare Part B -  No recommendations at this time    Zoster Not covered by Medicare Part B; may be covered  with your pharmacy  prescription benefits -  Zoster Vaccines(1 of 2) Never done     Annual Monitoring of Persistent Medications (ACE/ARB, digoxin diuretics, anticonvulsants)    Potassium Annually Lab Results   Component Value Date    K 4.4 07/20/2023         Creatinine   Annually Lab Results   Component Value Date    CREATSERUM 0.97 07/03/2024         BUN Annually Lab Results   Component Value Date    BUN 16 07/20/2023       Drug Serum Conc Annually No results found for: \"DIGOXIN\", \"DIG\", \"VALP\"

## 2024-08-16 ENCOUNTER — TELEPHONE (OUTPATIENT)
Dept: PULMONOLOGY | Facility: CLINIC | Age: 79
End: 2024-08-16

## 2024-08-16 NOTE — TELEPHONE ENCOUNTER
Received order for PAP supplies from [facility] via [place]. Placed in Brant MILLER's folder for signature

## 2024-08-20 ENCOUNTER — OFFICE VISIT (OUTPATIENT)
Dept: UROLOGY | Facility: HOSPITAL | Age: 79
End: 2024-08-20
Attending: OBSTETRICS & GYNECOLOGY
Payer: MEDICARE

## 2024-08-20 VITALS
HEIGHT: 65 IN | RESPIRATION RATE: 18 BRPM | DIASTOLIC BLOOD PRESSURE: 78 MMHG | SYSTOLIC BLOOD PRESSURE: 128 MMHG | WEIGHT: 182 LBS | BODY MASS INDEX: 30.32 KG/M2

## 2024-08-20 DIAGNOSIS — N39.41 URGE INCONTINENCE: Primary | ICD-10-CM

## 2024-08-20 DIAGNOSIS — N39.3 STRESS INCONTINENCE: ICD-10-CM

## 2024-08-20 PROBLEM — N88.2 CERVICAL STENOSIS (UTERINE CERVIX): Status: RESOLVED | Noted: 2023-11-28 | Resolved: 2024-08-20

## 2024-08-20 PROCEDURE — 99212 OFFICE O/P EST SF 10 MIN: CPT

## 2024-08-20 RX ORDER — NITROFURANTOIN 25; 75 MG/1; MG/1
100 CAPSULE ORAL 2 TIMES DAILY
Qty: 14 CAPSULE | Refills: 0 | Status: SHIPPED | OUTPATIENT
Start: 2024-08-20 | End: 2024-08-27

## 2024-08-20 NOTE — PROGRESS NOTES
Jaz Thayer  8/9/1945 8/20/24    Chief Complaint   Patient presents with    PT Follow-Up    [facility] f/u     Myrbetriq 50mg       HPI:  79 year old female who is status post hysteroscopy with D&C on 12/15/23. Surgery was indicated secondary to thickened endometrium and cervical stenosis.  Was found to have endometrial polyps. Pathology was benign. She was last seen on 4/22/24. She has a baseline history of UUI (reason for initial consult). Was prescribed Gemtesa 75 mg PO  which helped initially but then stopped working.  Given Myrbetriq 50 mg PO daily at last visit. Not on Estrace. Referred to PT. Returns for follow up.     Urodynamic testing on 11/28/23, which showed 250 ml capacity, +DO with no leaking at 81 ml and RADHA at 250 mL.     Interval history:  Had 4 visits of pelvic floor PT.   Has been taking Myrbetriq 50 mg PO daily. Rx was sent. No side effects, no problems with coverage.  Voids every 2 hours during the day and x 2 at night.   Has rare RADHA  No UUI.   Treated empirically for UTI on 7/3/24.   No symptoms today.   Bowels are constipated.   +       Objective:  /78 (BP Location: Right arm, Patient Position: Sitting)   Resp 18   Ht 65\"   Wt 182 lb (82.6 kg)   BMI 30.29 kg/m²     General:  Alert and oriented. Well-nourished, normally developed.  Thought and emotional status are appropriate, speech is understandable.  No acute distress.  Pelvic: deferred.     Impression:  Encounter Diagnoses   Name Primary?    Urge incontinence Yes    Stress incontinence        Plan:  Reports significant symptom improvement.   Plan to continue home exercises.  Continue Myrbetriq 50 mg PO daily.  Going to [facility] in the fall. Would like rx in case she gets UTI. Rx sent for NTF.  Follow up in 6 months or sooner prn.       Radha Koch MD, FACOG, FACS  Female Pelvic Medicine and  Reconstructive Surgery (Urogynecology)

## 2024-09-23 ENCOUNTER — LAB ENCOUNTER (OUTPATIENT)
Dept: LAB | Facility: HOSPITAL | Age: 79
End: 2024-09-23
Attending: INTERNAL MEDICINE
Payer: MEDICARE

## 2024-09-23 DIAGNOSIS — Z51.81 ENCOUNTER FOR THERAPEUTIC DRUG MONITORING: ICD-10-CM

## 2024-09-23 DIAGNOSIS — E78.2 MIXED HYPERLIPIDEMIA: ICD-10-CM

## 2024-09-23 DIAGNOSIS — N18.31 STAGE 3A CHRONIC KIDNEY DISEASE (HCC): ICD-10-CM

## 2024-09-23 DIAGNOSIS — M05.79 SEROPOSITIVE RHEUMATOID ARTHRITIS OF MULTIPLE SITES (HCC): ICD-10-CM

## 2024-09-23 LAB
ALBUMIN SERPL-MCNC: 4.6 G/DL (ref 3.2–4.8)
ALBUMIN/GLOB SERPL: 1.4 {RATIO} (ref 1–2)
ALP LIVER SERPL-CCNC: 79 U/L
ALT SERPL-CCNC: 15 U/L
ANION GAP SERPL CALC-SCNC: 8 MMOL/L (ref 0–18)
AST SERPL-CCNC: 20 U/L (ref ?–34)
BASOPHILS # BLD AUTO: 0.05 X10(3) UL (ref 0–0.2)
BASOPHILS NFR BLD AUTO: 1 %
BILIRUB SERPL-MCNC: 0.5 MG/DL (ref 0.2–1.1)
BILIRUB UR QL: NEGATIVE
BUN BLD-MCNC: 17 MG/DL (ref 9–23)
BUN/CREAT SERPL: 12.7 (ref 10–20)
CALCIUM BLD-MCNC: 9.9 MG/DL (ref 8.7–10.4)
CHLORIDE SERPL-SCNC: 107 MMOL/L (ref 98–112)
CHOLEST SERPL-MCNC: 224 MG/DL (ref ?–200)
CLARITY UR: CLEAR
CO2 SERPL-SCNC: 26 MMOL/L (ref 21–32)
CREAT BLD-MCNC: 1.34 MG/DL
CRP SERPL-MCNC: <0.4 MG/DL (ref ?–1)
DEPRECATED RDW RBC AUTO: 40.2 FL (ref 35.1–46.3)
EGFRCR SERPLBLD CKD-EPI 2021: 41 ML/MIN/1.73M2 (ref 60–?)
EOSINOPHIL # BLD AUTO: 0.17 X10(3) UL (ref 0–0.7)
EOSINOPHIL NFR BLD AUTO: 3.2 %
ERYTHROCYTE [DISTWIDTH] IN BLOOD BY AUTOMATED COUNT: 15 % (ref 11–15)
ERYTHROCYTE [SEDIMENTATION RATE] IN BLOOD: 46 MM/HR
FASTING PATIENT LIPID ANSWER: YES
FASTING STATUS PATIENT QL REPORTED: YES
GLOBULIN PLAS-MCNC: 3.4 G/DL (ref 2–3.5)
GLUCOSE BLD-MCNC: 96 MG/DL (ref 70–99)
GLUCOSE UR-MCNC: NORMAL MG/DL
HCT VFR BLD AUTO: 37.5 %
HDLC SERPL-MCNC: 48 MG/DL (ref 40–59)
HGB BLD-MCNC: 12.2 G/DL
HGB UR QL STRIP.AUTO: NEGATIVE
IMM GRANULOCYTES # BLD AUTO: 0.01 X10(3) UL (ref 0–1)
IMM GRANULOCYTES NFR BLD: 0.2 %
KETONES UR-MCNC: NEGATIVE MG/DL
LDLC SERPL CALC-MCNC: 160 MG/DL (ref ?–100)
LEUKOCYTE ESTERASE UR QL STRIP.AUTO: NEGATIVE
LYMPHOCYTES # BLD AUTO: 1.83 X10(3) UL (ref 1–4)
LYMPHOCYTES NFR BLD AUTO: 34.8 %
MCH RBC QN AUTO: 24.1 PG (ref 26–34)
MCHC RBC AUTO-ENTMCNC: 32.5 G/DL (ref 31–37)
MCV RBC AUTO: 74.1 FL
MONOCYTES # BLD AUTO: 0.7 X10(3) UL (ref 0.1–1)
MONOCYTES NFR BLD AUTO: 13.3 %
NEUTROPHILS # BLD AUTO: 2.5 X10 (3) UL (ref 1.5–7.7)
NEUTROPHILS # BLD AUTO: 2.5 X10(3) UL (ref 1.5–7.7)
NEUTROPHILS NFR BLD AUTO: 47.5 %
NITRITE UR QL STRIP.AUTO: NEGATIVE
NONHDLC SERPL-MCNC: 176 MG/DL (ref ?–130)
OSMOLALITY SERPL CALC.SUM OF ELEC: 293 MOSM/KG (ref 275–295)
PH UR: 5.5 [PH] (ref 5–8)
PLATELET # BLD AUTO: 274 10(3)UL (ref 150–450)
POTASSIUM SERPL-SCNC: 4.1 MMOL/L (ref 3.5–5.1)
PROT SERPL-MCNC: 8 G/DL (ref 5.7–8.2)
RBC # BLD AUTO: 5.06 X10(6)UL
SODIUM SERPL-SCNC: 141 MMOL/L (ref 136–145)
SP GR UR STRIP: 1.02 (ref 1–1.03)
TRIGL SERPL-MCNC: 90 MG/DL (ref 30–149)
UROBILINOGEN UR STRIP-ACNC: NORMAL
VLDLC SERPL CALC-MCNC: 17 MG/DL (ref 0–30)
WBC # BLD AUTO: 5.3 X10(3) UL (ref 4–11)

## 2024-09-23 PROCEDURE — 85025 COMPLETE CBC W/AUTO DIFF WBC: CPT

## 2024-09-23 PROCEDURE — 36415 COLL VENOUS BLD VENIPUNCTURE: CPT

## 2024-09-23 PROCEDURE — 85652 RBC SED RATE AUTOMATED: CPT

## 2024-09-23 PROCEDURE — 80053 COMPREHEN METABOLIC PANEL: CPT

## 2024-09-23 PROCEDURE — 86140 C-REACTIVE PROTEIN: CPT

## 2024-09-23 PROCEDURE — 81003 URINALYSIS AUTO W/O SCOPE: CPT

## 2024-09-23 PROCEDURE — 80061 LIPID PANEL: CPT

## 2024-10-01 ENCOUNTER — OFFICE VISIT (OUTPATIENT)
Dept: INTERNAL MEDICINE CLINIC | Facility: CLINIC | Age: 79
End: 2024-10-01
Payer: MEDICARE

## 2024-10-01 VITALS
HEIGHT: 65 IN | BODY MASS INDEX: 29.72 KG/M2 | HEART RATE: 84 BPM | RESPIRATION RATE: 18 BRPM | OXYGEN SATURATION: 98 % | DIASTOLIC BLOOD PRESSURE: 72 MMHG | SYSTOLIC BLOOD PRESSURE: 128 MMHG | WEIGHT: 178.38 LBS

## 2024-10-01 DIAGNOSIS — R10.84 GENERALIZED ABDOMINAL PAIN: ICD-10-CM

## 2024-10-01 DIAGNOSIS — K62.5 BRBPR (BRIGHT RED BLOOD PER RECTUM): Primary | ICD-10-CM

## 2024-10-01 DIAGNOSIS — N18.31 STAGE 3A CHRONIC KIDNEY DISEASE (HCC): ICD-10-CM

## 2024-10-01 DIAGNOSIS — R79.89 ELEVATED SERUM CREATININE: ICD-10-CM

## 2024-10-01 PROCEDURE — G2211 COMPLEX E/M VISIT ADD ON: HCPCS | Performed by: INTERNAL MEDICINE

## 2024-10-01 PROCEDURE — 99214 OFFICE O/P EST MOD 30 MIN: CPT | Performed by: INTERNAL MEDICINE

## 2024-10-01 NOTE — PROGRESS NOTES
Jaz Thayer female 79 year old         Chief Complaint   Patient presents with    Blood in stool ,  ,lower abd pain ,  5 lb  wt loss      Noticed it on  sept 16th -  blood on paper -      And red  streaks - has pictures the stool is normal colored.    No hematochezia -     No rectal  pain     Has some  lower  abd  - near pelvis  -  contstant  discomfort  - no dysuria   -  ?? Anxiety  -states the symptoms started after she noticed the blood.    Had  hemmoroid in past  had  surgery      Is to have  f/u  barium  swallow  for  Zenkers she was going to skip it but I told her she should proceed with it.  This was noted on endoscopy last year when she had some dysphagia.    Discussed labs showed some elevated creatinine we discussed the significance of this.      Patient Active Problem List   Diagnosis    Seropositive rheumatoid arthritis of multiple sites (HCC)    Mixed hyperlipidemia    L4-5 grade 2 spondylolisthesis    L4-5 left moderate-severe, L5-S1 left severe/right moderate foraminal stenosis    L4-5 large central herniated disc with moderate left cephald extruded fragment    L4-5 severe, L3-4 moderate central stenosis    Stress incontinence    Stage 3a chronic kidney disease (HCC)    Urge incontinence    Vaginal atrophy    Encounter for therapeutic drug monitoring    Encounter for other specified special examinations    Calcific tendonitis of right shoulder          Current Outpatient Medications on File Prior to Visit   Medication Sig Dispense Refill    ENBREL SURECLICK 50 MG/ML Subcutaneous Solution Auto-injector INJECT 1 PEN UNDER THE SKIN EVERY 7 DAYS 4 each 2    Mirabegron ER (MYRBETRIQ) 50 MG Oral Tablet 24 Hr Take 1 tablet (50 mg total) by mouth daily. 30 tablet 11    LORazepam 1 MG Oral Tab Take 1 tablet (1 mg total) by mouth daily as needed for Anxiety. 30 tablet 3    gabapentin 100 MG Oral Cap Take 1 capsule (100 mg total) by mouth 3 (three) times daily. 90 capsule 3    pantoprazole 40 MG  Oral Tab EC Take 1 tablet (40 mg total) by mouth every morning before breakfast.      cholecalciferol 25 MCG (1000 UT) Oral Tab Take 1 tablet (1,000 Units total) by mouth daily.      Multiple Vitamin (ONE-DAILY MULTI VITAMINS) Oral Tab Take 1 tablet by mouth daily.      fluticasone propionate 50 MCG/ACT Nasal Suspension 2 sprays by Nasal route daily. 3 each 1     No current facility-administered medications on file prior to visit.          Vitals:    10/01/24 1523   BP: 128/72   Pulse: 84   Resp: 18   VITALSBody mass index is 29.69 kg/m².    Pertinent findings on the physical exam; looks great  -  declined  rectal  -  abd  exam nl  not  tender      Jaz was seen today for follow - up.    Diagnoses and all orders for this visit:    BRBPR (bright red blood per rectum)  -     Cancel: GASTRO - INTERNAL  -     GASTRO - INTERNAL    Elevated serum creatinine  -     Basic Metabolic Panel (8); Future  -     US KIDNEYS (CPT=76775); Future    Stage 3a chronic kidney disease (HCC)    Generalized abdominal pain         He had some bright red blood but the stool itself was not consistent with hematochezia therefore I do not think she has any internal bleeding.  She states that the bleeding has decreased significantly.  Discussed needing to keep an eye on her bowel movement.  If there is any hematochezia that is a different story.  I suspect she has a hemorrhoid or fissure she declined a rectal exam at this time.  She does agree to see GI.-She did have a recent after meals which showed no significant anemia.    Discussed using Preparation H.  I told her I be happy to see her again soon if there is further bleeding and to do a rectal exam.    Her abdominal pain was discussed she thinks it is probably anxiety.  At this time we will hold off on any imaging to look for diverticulitis tumors etc. I think colonoscopy first at this time is the right thing to do    She has some chronic kidney disease we will recheck labs again in the  future.    Continue Enbrel for her rheumatoid arthritis.            This note was prepared using Dragon Medical voice recognition dictation software and as a result, errors may occur. When identified, these errors have been corrected. While every attempt is made to correct errors during dictation, discrepancies may still exist

## 2024-10-10 ENCOUNTER — LAB ENCOUNTER (OUTPATIENT)
Dept: LAB | Facility: HOSPITAL | Age: 79
End: 2024-10-10
Attending: INTERNAL MEDICINE
Payer: MEDICARE

## 2024-10-10 DIAGNOSIS — R79.89 ELEVATED SERUM CREATININE: ICD-10-CM

## 2024-10-10 LAB
ANION GAP SERPL CALC-SCNC: 6 MMOL/L (ref 0–18)
BUN BLD-MCNC: 11 MG/DL (ref 9–23)
BUN/CREAT SERPL: 10.5 (ref 10–20)
CALCIUM BLD-MCNC: 10 MG/DL (ref 8.7–10.4)
CHLORIDE SERPL-SCNC: 108 MMOL/L (ref 98–112)
CO2 SERPL-SCNC: 27 MMOL/L (ref 21–32)
CREAT BLD-MCNC: 1.05 MG/DL
EGFRCR SERPLBLD CKD-EPI 2021: 54 ML/MIN/1.73M2 (ref 60–?)
FASTING STATUS PATIENT QL REPORTED: YES
GLUCOSE BLD-MCNC: 103 MG/DL (ref 70–99)
OSMOLALITY SERPL CALC.SUM OF ELEC: 292 MOSM/KG (ref 275–295)
POTASSIUM SERPL-SCNC: 4.3 MMOL/L (ref 3.5–5.1)
SODIUM SERPL-SCNC: 141 MMOL/L (ref 136–145)

## 2024-10-10 PROCEDURE — 36415 COLL VENOUS BLD VENIPUNCTURE: CPT

## 2024-10-10 PROCEDURE — 80048 BASIC METABOLIC PNL TOTAL CA: CPT

## 2024-10-31 ENCOUNTER — HOSPITAL ENCOUNTER (OUTPATIENT)
Dept: ULTRASOUND IMAGING | Facility: HOSPITAL | Age: 79
Discharge: HOME | End: 2024-10-31
Attending: INTERNAL MEDICINE
Payer: MEDICARE

## 2024-10-31 DIAGNOSIS — R79.89 ELEVATED SERUM CREATININE: ICD-10-CM

## 2024-10-31 PROCEDURE — 76775 US EXAM ABDO BACK WALL LIM: CPT | Performed by: INTERNAL MEDICINE

## 2024-11-04 DIAGNOSIS — M05.79 SEROPOSITIVE RHEUMATOID ARTHRITIS OF MULTIPLE SITES (HCC): ICD-10-CM

## 2024-11-05 RX ORDER — MEDROXYPROGESTERONE ACETATE 150 MG/ML
INJECTION, SUSPENSION INTRAMUSCULAR
Qty: 4 EACH | Refills: 2 | Status: SHIPPED | OUTPATIENT
Start: 2024-11-05

## 2024-11-05 NOTE — TELEPHONE ENCOUNTER
Ask pt. To make her 6months follow up - due in 10/2024 - so whenever she gets a change in the next couple of months.

## 2024-11-05 NOTE — TELEPHONE ENCOUNTER
LOV: 4/18/24  Future Appointments   Date Time Provider Department Center   12/6/2024 11:40 AM Darryl Mccray MD [facility]   12/12/2024  2:30 PM Tita Jacobsen MD [facility]   2/18/2025  2:00 PM Radha Koch MD [facility]       RHEUMATOLOGY CLINIC- NEW PATIENT     aJz Thayer is a 78 year old female.     ASSESSMENT/PLAN:          ICD-10-CM     1. Seropositive rheumatoid arthritis of multiple sites (HCC)  M05.79         2. Encounter for therapeutic drug monitoring  Z51.81 Hepatitis Panel, Acute (4)       3. Encounter for other specified special examinations  Z01.89 Hepatitis Panel, Acute (4)       4. Calcific tendonitis of right shoulder  M75.31            DISCUSSION:  Patient with longstanding double seropositive RA well-controlled on weekly Enbrel.  Of note, she did not start the leflunomide prescribed at her last appointment as her symptoms were overall tolerable and only rarely requires current prednisone use.  Discussed with patient given her overall good control of symptoms, how would be reasonable to remain off leflunomide and continue Enbrel.  Patient information handout given regarding leflunomide should her flares become more persistent or frequent.     PLAN:  -Continue Enbrel weekly  - Prior lab work reviewed.  Will add acute hepatitis panel while on Enbrel  - Consult/evaluation communicated with referring physician/provider.     I will MyChart patient on receipt of above results.  Otherwise, patient to follow-up sometime in the fall with repeat labs drawn prior.     Ryan Villarreal DO  4/18/2024   11:09 AM     Discussed with patient that they are on chronic treatment with a high-risk medication that requires close lab monitoring for possible drug toxicity.  Additionally, discussed with patient give the possible immunosuppressive effects of their medication as well as their underlying hyper-inflammatory state, the importance of keeping vaccinations and age-appropriate  screenings up-to-date, given increased risk of complications and malignancies seen in these patient populations.  Patient to f/u with repeat labs drawn prior (standing labs ordered).  Last QuantiFERON TB testin/10/2024  Last Hepatitis testing: Ordered

## 2024-12-12 ENCOUNTER — OFFICE VISIT (OUTPATIENT)
Dept: GASTROENTEROLOGY | Facility: CLINIC | Age: 79
End: 2024-12-12
Payer: MEDICARE

## 2024-12-12 VITALS
DIASTOLIC BLOOD PRESSURE: 74 MMHG | SYSTOLIC BLOOD PRESSURE: 148 MMHG | BODY MASS INDEX: 29.82 KG/M2 | HEART RATE: 64 BPM | HEIGHT: 65 IN | WEIGHT: 179 LBS

## 2024-12-12 DIAGNOSIS — R42 DYSEQUILIBRIUM: ICD-10-CM

## 2024-12-12 DIAGNOSIS — R19.4 CHANGE IN BOWEL HABITS: ICD-10-CM

## 2024-12-12 DIAGNOSIS — K92.1 HEMATOCHEZIA: Primary | ICD-10-CM

## 2024-12-12 DIAGNOSIS — K59.00 CONSTIPATION, UNSPECIFIED CONSTIPATION TYPE: ICD-10-CM

## 2024-12-12 DIAGNOSIS — K22.5 ZENKER'S DIVERTICULUM: ICD-10-CM

## 2024-12-12 DIAGNOSIS — T17.308D CHOKING, SUBSEQUENT ENCOUNTER: ICD-10-CM

## 2024-12-12 PROCEDURE — 99214 OFFICE O/P EST MOD 30 MIN: CPT | Performed by: INTERNAL MEDICINE

## 2024-12-12 NOTE — PROGRESS NOTES
[facility] - Gastroenterology                                                                                                                 Chief Complaint   Patient presents with    Rectal Bleeding     Loose stools with urgency       HPI:   Jaz Thayer is a 79 year old year-old female with history of psoriatic arthritis on enbrel here for the following:      Pt developed hematochezia and diarrhea in September.  She has to move her bowels every time after eating.  She moves her bowel at least three times a day. The stools are loose but not watery.  She notices blood every time she has a BM.  She describes the bleeding as streaks of blood but also balls of bloody stools. Before September, she struggled with constipation chronically.      Pt has been feeling dysequilibrium since yesterday.  She has gone to PT for this in the past and has follow up with Dr. Mccray tomorrow.   Denies SOB, CP, or palpitations.    She is still having choking after eating intermittently and hasn't yet followed up with ENT after the EGD. It is occurring 2-3x/week and not progressive.  Her cough resolved along with her reflux symptoms with pantoprazole 40 mg daily.      Denies f/c, weight loss, abdominal pain, or any other symptoms.      Video swallow test 2021 - done for oropharyngeal dysphagia was unremarkable.     Denies family h/o CRC.     Prior endoscopies:  CLN over 10 years ago - negative for polyps.   FOBT negative 2019.      EGD 10/2023  Impression:  1. A 5-10 mm Zenker's diverticulum noted in the proximal esophagus. The patient's choking may be due to the Zenker's diverticulum.    2. Non-obstructing Schatzki's ring s/p disruption in four quadrants with cold forceps biopsies.    3. 3 cm hiatal hernia.   4. Mild gastritis biopsied.      Recommend:  1. Await pathology.  2. Schedule the esophagram when able.   3. Follow up with ENT  after the esophagram to further discuss management of the Zenker's diverticulum.   4.  Call [phone] to set up an appointment with one of the allergy doctors for your persistent post nasal drip and congestion.   5. Start pantoprazole 40 mg daily for acid reflux.   6. Follow up with Dr. Jacobsen in after the esophagram is done and after you see ENT and allergy.      Path  Final Diagnosis:      A. Gastric; biopsies:  Mild chronic inactive gastritis.  Diff-Quik stain (appropriate control reactivity) shows no definitive evidence of H. pylori-like microorganisms.  No evidence of dysplasia or carcinoma identified.     B. Esophagus; biopsies:   Fragments of squamous mucosa with features of reflux esophagitis.  Minute strips of associated gastric glandular type mucosa with mild chronic inflammation.  No evidence of intestinal metaplasia, dysplasia or carcinoma identified.       Soc:  -denies smoking  -denies heavy Etoh  -no illicit drug use    Wt Readings from Last 6 Encounters:   12/12/24 179 lb (81.2 kg)   10/01/24 178 lb 6.4 oz (80.9 kg)   08/20/24 182 lb (82.6 kg)   08/13/24 182 lb (82.6 kg)   06/27/24 182 lb 12.8 oz (82.9 kg)   06/04/24 180 lb (81.6 kg)        History, Medications, Allergies, ROS:      Past Medical History:    Allergic rhinitis    Anesthesia complication    difficulty waking up    Anxiety    Anxiety state    Arthritis    I take medication for RA    Back problem    Cataract    Esophageal reflux    Osteoarthritis    Rheumatoid arthritis (HCC)    Sleep apnea    TIA (transient ischemic attack)    Visual impairment    reading glasses      Past Surgical History:   Procedure Laterality Date    Appendectomy  09/16/2015    Back surgery  09/14/2021     L4-5 extreme lateral interbody fusion with metallic cage and cadaver bone graft through the side, plus L4 inferior L3 laminectomy, foraminotomy, L4-5 discectomy and decompression of the spinal nerves with metallic L4-5 trans facet through the back    Cataract   2016    Surgery    Colonoscopy  2006      1976    Upper gi endoscopy,exam        Family Hx:   Family History   Problem Relation Age of Onset    Arthritis Father     Other (Other) Father         stroke    Dementia Father     Heart Disorder Mother     Other (Other) Mother         stroke    Stroke Mother     Other (Other) Sister         ALS    Dementia Sister     Stroke Sister       Social History:   Social History     Socioeconomic History    Marital status:    Tobacco Use    Smoking status: Never     Passive exposure: Never    Smokeless tobacco: Never   Vaping Use    Vaping status: Never Used   Substance and Sexual Activity    Alcohol use: Yes     Comment: Socially twice a month    Drug use: Never    Sexual activity: Not Currently   Other Topics Concern    Caffeine Concern Yes    Stress Concern No    Weight Concern No    Special Diet No    Exercise No    Seat Belt Yes   Social History Narrative    The patient uses the following assistive device(s):  single-point cane.  temporary    The patient does live in a home with stairs.        Medications (Active prior to today's visit):  Current Outpatient Medications   Medication Sig Dispense Refill    ENBREL SURECLICK 50 MG/ML Subcutaneous Solution Auto-injector INJECT 1 PEN UNDER THE SKIN EVERY 7 DAYS 4 each 2    Mirabegron ER (MYRBETRIQ) 50 MG Oral Tablet 24 Hr Take 1 tablet (50 mg total) by mouth daily. 30 tablet 11    LORazepam 1 MG Oral Tab Take 1 tablet (1 mg total) by mouth daily as needed for Anxiety. 30 tablet 3    gabapentin 100 MG Oral Cap Take 1 capsule (100 mg total) by mouth 3 (three) times daily. 90 capsule 3    pantoprazole 40 MG Oral Tab EC Take 1 tablet (40 mg total) by mouth every morning before breakfast.      cholecalciferol 25 MCG (1000 UT) Oral Tab Take 1 tablet (1,000 Units total) by mouth daily.      Multiple Vitamin (ONE-DAILY MULTI VITAMINS) Oral Tab Take 1 tablet by mouth daily.      fluticasone propionate 50 MCG/ACT Nasal  Suspension 2 sprays by Nasal route daily. 3 each 1       Allergies:  No Known Allergies    ROS:   CONSTITUTIONAL:  negative for fevers, rigors  EYES:  negative for diplopia   RESPIRATORY:  negative for severe shortness of breath  CARDIOVASCULAR:  negative for crushing sub-sternal chest pain  GASTROINTESTINAL:  see HPI  GENITOURINARY:  negative for dysuria or gross hematuria  INTEGUMENT/BREAST:  SKIN:  negative for jaundice   ALLERGIC/IMMUNOLOGIC:  negative for hay fever  ENDOCRINE:  negative for cold intolerance and heat intolerance  MUSCULOSKELETAL:  negative for joint effusion/severe erythema  BEHAVIOR/PSYCH:  negative for psychotic behavior      PHYSICAL EXAM:   Blood pressure 148/74, pulse 64, height 5' 5\" (1.651 m), weight 179 lb (81.2 kg).    GEN - Patient appears comfortable and in no acute discomfort  ENT - MMM  EYES - the sclera appears anicteric  CV - no edema  RESP -  No increased work of breathing  ABDOMEN - soft, non-tender exam in all quadrants without rigidity or guarding, non-distended, no abnormal bowel sounds noted, no masses are palpated  SKIN - No jaundice  NEURO - Alert and appropriate, and gross movements of extremities normal  PSYCH - normal affect, non-agitated      Labs/Imaging:     Patient's pertinent labs and imaging were reviewed and discussed with patient today.      .  ASSESSMENT/PLAN:   79 F with h/o psoriatic arthritis on enbrel here for the following:    Change in bowel habits  Hematochezia  Diarrhea  New since Sept 2024. Previously was chronically constipated and currently moving her bowels at least 3x/day with bloody Bms every time.  Infectious vs inflammatory vs neoplasm vs overflow diarrhea with hemorrhoids are all possibilities. She already has a h/o autoimmune disease so IBD is certainly possible. She also has not had a CLN in >10 years. We discussed proceeding with a CLN for luminal eval and biopsies, but she is hesitant. Will start with stool testing - C. Diff, stool culture,  crypto/giardia, calprotectin, and blood work - CBC, CMP, CRP, TSH, and a KUB to eval stool burden and keep close follow up next week to discuss CLN again.      Oropharyngeal dysphagia, choking 2/2 Zenker's diverticulum - video swallow in 2021 was unremarkable. ENT eval unremarkable in the past but she was advised to follow up with ENT after her EGD in 2023, which showed a Zenker's diverticulum - she hasn't yet. The esophagram wasn't done either. Recommend follow up with ENT to discuss whether surgical intervention is warranted.      GERD with esophagitis  Non-obstructing Schatzki's ring - noted on EGD 10/2023.  Symptoms are well controlled with pantoprazole 40 mg daily.     CRC screening - last CLN was over 10 years ago. FOBT was last checked and negative in 2019. Pt previously deferred repeat CLN or other screening tests for CRC but may need to reconsider given new hematochezia, see above.     Dysequilibirum - pt denies symptoms of pre-syncope or cardiac symptoms and has had this before and even gone to PT for it.  She has follow up with her PCP tomorrow and checking blood work as above. If symptoms worsen before she sees her PCP, she was advised to go to the ER.      Recommend    - C.diff, stool culture, crypto/giardia, stool calprotectin    - CBC, CMP, CRP, TSH    - Referral to ENT to weigh in on her Zenker's diverticulum     - Follow up next week to discuss CLN further     - Keep follow up with PCP for the dysequilibrium       Orders This Visit:  Orders Placed This Encounter   Procedures    Calprotectin, Fecal    CBC W Differential W Platelet    Comp Metabolic Panel (14)    C-Reactive Protein    TSH W Reflex To Free T4    C. diff toxigenic PCR (OPT)    Giardia + Crypto Antigen, Stool    Stool Culture [[facility] [id number]]       Meds This Visit:  Requested Prescriptions      No prescriptions requested or ordered in this encounter       Imaging & Referrals:  ENT - INTERNAL  XR ABDOMEN (1 VIEW) (CPT=74018)         Tita  MD [place]          This note was partially prepared using Dragon Medical voice recognition dictation software. As a result, errors may occur. When identified, these errors have been corrected. While every attempt is made to correct errors during dictation, discrepancies may still exist.

## 2024-12-12 NOTE — PATIENT INSTRUCTIONS
PLAN    - Please have the abdominal x-ray done     - Have the stool studies done    - Follow up with Dr. Jacobsen next week

## 2024-12-13 ENCOUNTER — OFFICE VISIT (OUTPATIENT)
Dept: INTERNAL MEDICINE CLINIC | Facility: CLINIC | Age: 79
End: 2024-12-13
Payer: MEDICARE

## 2024-12-13 ENCOUNTER — LAB ENCOUNTER (OUTPATIENT)
Dept: LAB | Facility: HOSPITAL | Age: 79
End: 2024-12-13
Attending: INTERNAL MEDICINE
Payer: MEDICARE

## 2024-12-13 ENCOUNTER — HOSPITAL ENCOUNTER (OUTPATIENT)
Dept: GENERAL RADIOLOGY | Facility: HOSPITAL | Age: 79
Discharge: HOME | End: 2024-12-13
Attending: INTERNAL MEDICINE
Payer: MEDICARE

## 2024-12-13 VITALS
SYSTOLIC BLOOD PRESSURE: 134 MMHG | OXYGEN SATURATION: 97 % | WEIGHT: 180 LBS | BODY MASS INDEX: 30 KG/M2 | HEART RATE: 85 BPM | DIASTOLIC BLOOD PRESSURE: 74 MMHG

## 2024-12-13 DIAGNOSIS — M05.79 SEROPOSITIVE RHEUMATOID ARTHRITIS OF MULTIPLE SITES (HCC): ICD-10-CM

## 2024-12-13 DIAGNOSIS — K92.1 HEMATOCHEZIA: ICD-10-CM

## 2024-12-13 DIAGNOSIS — N18.31 STAGE 3A CHRONIC KIDNEY DISEASE (HCC): ICD-10-CM

## 2024-12-13 DIAGNOSIS — N39.3 STRESS INCONTINENCE OF URINE: ICD-10-CM

## 2024-12-13 DIAGNOSIS — K59.00 CONSTIPATION, UNSPECIFIED CONSTIPATION TYPE: ICD-10-CM

## 2024-12-13 DIAGNOSIS — R42 DYSEQUILIBRIUM: ICD-10-CM

## 2024-12-13 DIAGNOSIS — R19.4 CHANGE IN BOWEL HABITS: ICD-10-CM

## 2024-12-13 DIAGNOSIS — R03.0 ELEVATED BP WITHOUT DIAGNOSIS OF HYPERTENSION: ICD-10-CM

## 2024-12-13 DIAGNOSIS — K62.5 BRBPR (BRIGHT RED BLOOD PER RECTUM): Primary | ICD-10-CM

## 2024-12-13 LAB
ALBUMIN SERPL-MCNC: 4.5 G/DL (ref 3.2–4.8)
ALBUMIN/GLOB SERPL: 1.4 {RATIO} (ref 1–2)
ALP LIVER SERPL-CCNC: 82 U/L
ALT SERPL-CCNC: 20 U/L
ANION GAP SERPL CALC-SCNC: 5 MMOL/L (ref 0–18)
AST SERPL-CCNC: 17 U/L (ref ?–34)
BASOPHILS # BLD AUTO: 0.04 X10(3) UL (ref 0–0.2)
BASOPHILS NFR BLD AUTO: 0.8 %
BILIRUB SERPL-MCNC: 0.5 MG/DL (ref 0.2–1.1)
BUN BLD-MCNC: 13 MG/DL (ref 9–23)
BUN/CREAT SERPL: 11.7 (ref 10–20)
CALCIUM BLD-MCNC: 9.8 MG/DL (ref 8.7–10.4)
CHLORIDE SERPL-SCNC: 108 MMOL/L (ref 98–112)
CO2 SERPL-SCNC: 29 MMOL/L (ref 21–32)
CREAT BLD-MCNC: 1.11 MG/DL
CRP SERPL-MCNC: <0.4 MG/DL (ref ?–1)
CRYPTOSP AG STL QL IA: NEGATIVE
DEPRECATED RDW RBC AUTO: 40 FL (ref 35.1–46.3)
EGFRCR SERPLBLD CKD-EPI 2021: 51 ML/MIN/1.73M2 (ref 60–?)
EOSINOPHIL # BLD AUTO: 0.16 X10(3) UL (ref 0–0.7)
EOSINOPHIL NFR BLD AUTO: 3.2 %
ERYTHROCYTE [DISTWIDTH] IN BLOOD BY AUTOMATED COUNT: 14.8 % (ref 11–15)
FASTING STATUS PATIENT QL REPORTED: YES
G LAMBLIA AG STL QL IA: NEGATIVE
GLOBULIN PLAS-MCNC: 3.2 G/DL (ref 2–3.5)
GLUCOSE BLD-MCNC: 104 MG/DL (ref 70–99)
HCT VFR BLD AUTO: 36.8 %
HGB BLD-MCNC: 11.8 G/DL
IMM GRANULOCYTES # BLD AUTO: 0.01 X10(3) UL (ref 0–1)
IMM GRANULOCYTES NFR BLD: 0.2 %
LYMPHOCYTES # BLD AUTO: 1.9 X10(3) UL (ref 1–4)
LYMPHOCYTES NFR BLD AUTO: 38.5 %
MCH RBC QN AUTO: 23.9 PG (ref 26–34)
MCHC RBC AUTO-ENTMCNC: 32.1 G/DL (ref 31–37)
MCV RBC AUTO: 74.5 FL
MONOCYTES # BLD AUTO: 0.72 X10(3) UL (ref 0.1–1)
MONOCYTES NFR BLD AUTO: 14.6 %
NEUTROPHILS # BLD AUTO: 2.1 X10 (3) UL (ref 1.5–7.7)
NEUTROPHILS # BLD AUTO: 2.1 X10(3) UL (ref 1.5–7.7)
NEUTROPHILS NFR BLD AUTO: 42.7 %
OSMOLALITY SERPL CALC.SUM OF ELEC: 294 MOSM/KG (ref 275–295)
PLATELET # BLD AUTO: 294 10(3)UL (ref 150–450)
POTASSIUM SERPL-SCNC: 4.3 MMOL/L (ref 3.5–5.1)
PROT SERPL-MCNC: 7.7 G/DL (ref 5.7–8.2)
RBC # BLD AUTO: 4.94 X10(6)UL
SODIUM SERPL-SCNC: 142 MMOL/L (ref 136–145)
TSI SER-ACNC: 1.29 UIU/ML (ref 0.55–4.78)
WBC # BLD AUTO: 4.9 X10(3) UL (ref 4–11)

## 2024-12-13 PROCEDURE — 87329 GIARDIA AG IA: CPT

## 2024-12-13 PROCEDURE — 85025 COMPLETE CBC W/AUTO DIFF WBC: CPT

## 2024-12-13 PROCEDURE — 80053 COMPREHEN METABOLIC PANEL: CPT

## 2024-12-13 PROCEDURE — 87046 STOOL CULTR AEROBIC BACT EA: CPT | Performed by: INTERNAL MEDICINE

## 2024-12-13 PROCEDURE — 74018 RADEX ABDOMEN 1 VIEW: CPT | Performed by: INTERNAL MEDICINE

## 2024-12-13 PROCEDURE — 87272 CRYPTOSPORIDIUM AG IF: CPT

## 2024-12-13 PROCEDURE — 99214 OFFICE O/P EST MOD 30 MIN: CPT | Performed by: INTERNAL MEDICINE

## 2024-12-13 PROCEDURE — 36415 COLL VENOUS BLD VENIPUNCTURE: CPT

## 2024-12-13 PROCEDURE — 87493 C DIFF AMPLIFIED PROBE: CPT

## 2024-12-13 PROCEDURE — 87045 FECES CULTURE AEROBIC BACT: CPT | Performed by: INTERNAL MEDICINE

## 2024-12-13 PROCEDURE — 87077 CULTURE AEROBIC IDENTIFY: CPT | Performed by: INTERNAL MEDICINE

## 2024-12-13 PROCEDURE — G2211 COMPLEX E/M VISIT ADD ON: HCPCS | Performed by: INTERNAL MEDICINE

## 2024-12-13 PROCEDURE — 84443 ASSAY THYROID STIM HORMONE: CPT

## 2024-12-13 PROCEDURE — 83993 ASSAY FOR CALPROTECTIN FECAL: CPT

## 2024-12-13 PROCEDURE — 86140 C-REACTIVE PROTEIN: CPT

## 2024-12-13 RX ORDER — PANTOPRAZOLE SODIUM 40 MG/1
40 TABLET, DELAYED RELEASE ORAL
Qty: 90 TABLET | Refills: 2 | Status: SHIPPED | OUTPATIENT
Start: 2024-12-13 | End: 2024-12-13

## 2024-12-13 RX ORDER — PANTOPRAZOLE SODIUM 40 MG/1
40 TABLET, DELAYED RELEASE ORAL
Qty: 90 TABLET | Refills: 2 | Status: SHIPPED | OUTPATIENT
Start: 2024-12-13

## 2024-12-13 NOTE — PROGRESS NOTES
Jaz Thayer female 79 year old         Chief Complaint   Patient presents with    Follow - Up  rectal bleeding  and elevated  bp  , ckd and  RA       Saw  GI  to have  scope  soon     Has not had  any sig bleeding  since     Labs done    today  no  sig drop in hg     Last  lft  good      Mild ckd  discussed      RA  doing  well no sig sx  on enbrel        BP high  at GI office  - has  no history of  hypertension      Back doing  well - had  surgery  2  yrs ago      Bladder doing  well -  is 100% bettter - so happy on current meds     ROS  otherwise      Patient Active Problem List   Diagnosis    Seropositive rheumatoid arthritis of multiple sites (HCC)    Mixed hyperlipidemia    L4-5 grade 2 spondylolisthesis    L4-5 left moderate-severe, L5-S1 left severe/right moderate foraminal stenosis    L4-5 large central herniated disc with moderate left cephald extruded fragment    L4-5 severe, L3-4 moderate central stenosis    Stress incontinence    Stage 3a chronic kidney disease (HCC)    Urge incontinence    Vaginal atrophy    Encounter for therapeutic drug monitoring    Encounter for other specified special examinations    Calcific tendonitis of right shoulder          Medications Ordered Prior to Encounter[1]       Vitals:    12/13/24 1319   BP: 134/74   Pulse:    VITALSBody mass index is 29.95 kg/m².    Pertinent findings on the physical exam; bp was  good  -  cor reg  no  obvious joint swelling  -  no pallor      Jaz was seen today for follow - up.    Diagnoses and all orders for this visit:    BRBPR (bright red blood per rectum)    Stage 3a chronic kidney disease (HCC)    Seropositive rheumatoid arthritis of multiple sites (HCC)    Elevated BP without diagnosis of hypertension    Stress incontinence of urine    Other orders  -     Discontinue: pantoprazole 40 MG Oral Tab EC; Take 1 tablet (40 mg total) by mouth every morning before breakfast.  -     pantoprazole 40 MG Oral Tab EC; Take 1 tablet (40  mg total) by mouth every morning before breakfast.      Has not had  any concerning   bleeding  - still some  stool  superficial  bleeding     Awaiting  scope -  recent  Hg  stable      If has any  hematochezia  - ED      Bp was  good  need to  follow        RA  doing well cpm -  has  no cardiac  sxs      Bladder  great  cpm    CKD  doing  great  - recent labs better                This note was prepared using Dragon Medical voice recognition dictation software and as a result, errors may occur. When identified, these errors have been corrected. While every attempt is made to correct errors during dictation, discrepancies may still exist            [1]   Current Outpatient Medications on File Prior to Visit   Medication Sig Dispense Refill    ENBREL SURECLICK 50 MG/ML Subcutaneous Solution Auto-injector INJECT 1 PEN UNDER THE SKIN EVERY 7 DAYS 4 each 2    Mirabegron ER (MYRBETRIQ) 50 MG Oral Tablet 24 Hr Take 1 tablet (50 mg total) by mouth daily. 30 tablet 11    gabapentin 100 MG Oral Cap Take 1 capsule (100 mg total) by mouth 3 (three) times daily. 90 capsule 3    cholecalciferol 25 MCG (1000 UT) Oral Tab Take 1 tablet (1,000 Units total) by mouth daily.      Multiple Vitamin (ONE-DAILY MULTI VITAMINS) Oral Tab Take 1 tablet by mouth daily.      fluticasone propionate 50 MCG/ACT Nasal Suspension 2 sprays by Nasal route daily. 3 each 1    LORazepam 1 MG Oral Tab Take 1 tablet (1 mg total) by mouth daily as needed for Anxiety. 30 tablet 3     No current facility-administered medications on file prior to visit.

## 2024-12-14 LAB
C DIFF TOX B STL QL: NEGATIVE
CALPROTECTIN STL-MCNT: >800 ΜG/G (ref ?–50)

## 2024-12-19 ENCOUNTER — VIRTUAL PHONE E/M (OUTPATIENT)
Dept: GASTROENTEROLOGY | Facility: CLINIC | Age: 79
End: 2024-12-19
Payer: MEDICARE

## 2024-12-19 ENCOUNTER — TELEPHONE (OUTPATIENT)
Facility: CLINIC | Age: 79
End: 2024-12-19

## 2024-12-19 DIAGNOSIS — K92.1 HEMATOCHEZIA: Primary | ICD-10-CM

## 2024-12-19 DIAGNOSIS — R19.7 DIARRHEA, UNSPECIFIED TYPE: ICD-10-CM

## 2024-12-19 DIAGNOSIS — R19.4 CHANGE IN BOWEL HABITS: ICD-10-CM

## 2024-12-19 DIAGNOSIS — K21.00 GASTROESOPHAGEAL REFLUX DISEASE WITH ESOPHAGITIS WITHOUT HEMORRHAGE: ICD-10-CM

## 2024-12-19 DIAGNOSIS — R13.12 OROPHARYNGEAL DYSPHAGIA: ICD-10-CM

## 2024-12-19 PROCEDURE — 99443 PHONE E/M BY PHYS 21-30 MIN: CPT | Performed by: INTERNAL MEDICINE

## 2024-12-19 RX ORDER — SODIUM, POTASSIUM,MAG SULFATES 17.5-3.13G
SOLUTION, RECONSTITUTED, ORAL ORAL
Qty: 177 ML | Refills: 0 | Status: SHIPPED | OUTPATIENT
Start: 2024-12-19 | End: 2024-12-20

## 2024-12-19 NOTE — PROGRESS NOTES
Virtual Telephone Check-In    Jaz Thayer verbally consents to a Virtual/Telephone Check-In visit on 12/19/24.  Patient has been referred to the [facility] website at [email or web address] to review the yearly Consent to Treat document.    Patient understands and accepts financial responsibility for any deductible, co-insurance and/or co-pays associated with this service.    Duration of the service: >30 minutes                                                                                                [facility] - Gastroenterology                                                                                                                 Chief Complaint   Patient presents with    Follow - Up       HPI:   Jaz Thayer is a 79 year old year-old female with history of psoriatic arthritis on enbrel here for the following:      Stool calprotectin is > 800. Infectious stool studies were negative. CRP is wnl and CBC, TSH, and CMP unremarkable.  KUB shows a moderate amount of stool.      Pt is still having hematochezia 3x/day - stools are light brown in thin strips and there is not as much blood.  She thinks symptoms are mildly improved compared to last week but persistent.     Taking pantoprazole every other day or so.     She is still having choking after eating intermittently and hasn't yet followed up with ENT after the EGD. It is occurring 2-3x/week and not progressive.  Her cough resolved along with her reflux symptoms with pantoprazole 40 mg daily-PRN.      Denies f/c, weight loss, abdominal pain, dysequilibrium, dizziness, lightheadedness, or any other symptoms.      Video swallow test 2021 - done for oropharyngeal dysphagia was unremarkable.     Denies family h/o CRC.     Prior endoscopies:  CLN over 10 years ago - negative for polyps.   FOBT negative 2019.      EGD 10/2023  Impression:  1. A 5-10 mm Zenker's diverticulum noted in the proximal esophagus. The patient's choking may be due to  the Zenker's diverticulum.    2. Non-obstructing Schatzki's ring s/p disruption in four quadrants with cold forceps biopsies.    3. 3 cm hiatal hernia.   4. Mild gastritis biopsied.      Recommend:  1. Await pathology.  2. Schedule the esophagram when able.   3. Follow up with ENT after the esophagram to further discuss management of the Zenker's diverticulum.   4.  Call [phone] to set up an appointment with one of the allergy doctors for your persistent post nasal drip and congestion.   5. Start pantoprazole 40 mg daily for acid reflux.   6. Follow up with Dr. Jacobsen in after the esophagram is done and after you see ENT and allergy.      Path  Final Diagnosis:      A. Gastric; biopsies:  Mild chronic inactive gastritis.  Diff-Quik stain (appropriate control reactivity) shows no definitive evidence of H. pylori-like microorganisms.  No evidence of dysplasia or carcinoma identified.     B. Esophagus; biopsies:   Fragments of squamous mucosa with features of reflux esophagitis.  Minute strips of associated gastric glandular type mucosa with mild chronic inflammation.  No evidence of intestinal metaplasia, dysplasia or carcinoma identified.       Soc:  -denies smoking  -denies heavy Etoh  -no illicit drug use    Wt Readings from Last 6 Encounters:   12/13/24 180 lb (81.6 kg)   12/12/24 179 lb (81.2 kg)   10/01/24 178 lb 6.4 oz (80.9 kg)   08/20/24 182 lb (82.6 kg)   08/13/24 182 lb (82.6 kg)   06/27/24 182 lb 12.8 oz (82.9 kg)        History, Medications, Allergies, ROS:      Past Medical History:    Allergic rhinitis    Anesthesia complication    difficulty waking up    Anxiety    Anxiety state    Arthritis    I take medication for RA    Back problem    Cataract    Esophageal reflux    Osteoarthritis    Rheumatoid arthritis (HCC)    Sleep apnea    TIA (transient ischemic attack)    Visual impairment    reading glasses      Past Surgical History:   Procedure Laterality Date    Appendectomy  09/16/2015    Back  surgery  2021     L4-5 extreme lateral interbody fusion with metallic cage and cadaver bone graft through the side, plus L4 inferior L3 laminectomy, foraminotomy, L4-5 discectomy and decompression of the spinal nerves with metallic L4-5 trans facet through the back    Cataract  2016    Surgery    Colonoscopy  2006      1976    Upper gi endoscopy,exam        Family Hx:   Family History   Problem Relation Age of Onset    Arthritis Father     Other (Other) Father         stroke    Dementia Father     Heart Disorder Mother     Other (Other) Mother         stroke    Stroke Mother     Other (Other) Sister         ALS    Dementia Sister     Stroke Sister       Social History:   Social History     Socioeconomic History    Marital status:    Tobacco Use    Smoking status: Never     Passive exposure: Never    Smokeless tobacco: Never   Vaping Use    Vaping status: Never Used   Substance and Sexual Activity    Alcohol use: Yes     Comment: Socially twice a month    Drug use: Never    Sexual activity: Not Currently   Other Topics Concern    Caffeine Concern Yes    Stress Concern No    Weight Concern No    Special Diet No    Exercise No    Seat Belt Yes   Social History Narrative    The patient uses the following assistive device(s):  single-point cane.  temporary    The patient does live in a home with stairs.        Medications (Active prior to today's visit):  Current Outpatient Medications   Medication Sig Dispense Refill    Na Sulfate-K Sulfate-Mg Sulf (SUPREP BOWEL PREP KIT) 17.5-3.13-1.6 GM/177ML Oral Solution Take as directed 177 mL 0    pantoprazole 40 MG Oral Tab EC Take 1 tablet (40 mg total) by mouth every morning before breakfast. 90 tablet 2    ENBREL SURECLICK 50 MG/ML Subcutaneous Solution Auto-injector INJECT 1 PEN UNDER THE SKIN EVERY 7 DAYS 4 each 2    Mirabegron ER (MYRBETRIQ) 50 MG Oral Tablet 24 Hr Take 1 tablet (50 mg total) by mouth daily. 30 tablet 11    LORazepam 1 MG Oral Tab  Take 1 tablet (1 mg total) by mouth daily as needed for Anxiety. 30 tablet 3    gabapentin 100 MG Oral Cap Take 1 capsule (100 mg total) by mouth 3 (three) times daily. 90 capsule 3    cholecalciferol 25 MCG (1000 UT) Oral Tab Take 1 tablet (1,000 Units total) by mouth daily.      Multiple Vitamin (ONE-DAILY MULTI VITAMINS) Oral Tab Take 1 tablet by mouth daily.      fluticasone propionate 50 MCG/ACT Nasal Suspension 2 sprays by Nasal route daily. 3 each 1       Allergies:  No Known Allergies    ROS:   CONSTITUTIONAL:  negative for fevers, rigors  EYES:  negative for diplopia   RESPIRATORY:  negative for severe shortness of breath  CARDIOVASCULAR:  negative for crushing sub-sternal chest pain  GASTROINTESTINAL:  see HPI  GENITOURINARY:  negative for dysuria or gross hematuria  INTEGUMENT/BREAST:  SKIN:  negative for jaundice   ALLERGIC/IMMUNOLOGIC:  negative for hay fever  ENDOCRINE:  negative for cold intolerance and heat intolerance  MUSCULOSKELETAL:  negative for joint effusion/severe erythema  BEHAVIOR/PSYCH:  negative for psychotic behavior      PHYSICAL EXAM:   There were no vitals taken for this visit.    Phone.       Labs/Imaging:     Patient's pertinent labs and imaging were reviewed and discussed with patient today.      .  ASSESSMENT/PLAN:   79 F with h/o psoriatic arthritis on enbrel here for the following:    Change in bowel habits  Hematochezia  Diarrhea  Elevated stool calprotectin (>800)  New since Sept 2024. Previously was chronically constipated and currently moving her bowels at least 3x/day with bloody Bms every time. Infectious stool studies were negative but stool calprotectin is > 800 in the setting of known autoimmune disease (pt is on Enbrel for arthritis), which is concerning for IBD.  CRP was wnl. She is amenable with a colonoscopy. Recommend doing this with the next available provider. She understands that if symptoms worsen in the interim, I recommend going to the ER for an admission for  expedited CLN.    Oropharyngeal dysphagia, choking 2/2 Zenker's diverticulum - video swallow in 2021 was unremarkable. ENT eval unremarkable in the past but she was advised to follow up with ENT after her EGD in 2023, which showed a Zenker's diverticulum - she hasn't yet. The esophagram wasn't done either. Recommend follow up with ENT to discuss whether surgical intervention is warranted.      GERD with esophagitis  Non-obstructing Schatzki's ring - noted on EGD 10/2023.  Symptoms are well controlled with pantoprazole 40 mg daily-PRN.     CRC screening - last CLN was over 10 years ago. FOBT was last checked and negative in 2019. Pt previously deferred repeat CLN or other screening tests for CRC but needs a CLN now given ongoing hematochezia and concern for IBD, see above.     Recommend    - CLN with MAC with the next available provider - need biopsies of the ileum and colon     - Referral to ENT to weigh in on her Zenker's diverticulum     - Continue pantoprazole 40 mg daily-PRN    - If symptoms worsen in the interim, recommend going to the ER for an admission and expedited colonoscopy.     RTC after the CLN.     Colonoscopy consent: I have discussed the risks, benefits, and alternatives to colonoscopy with the patient/primary decision maker [who demonstrated understanding], including but not limited to the risks of bleeding, infection, pain, death, as well as the risks of anesthesia and perforation all leading to prolonged hospitalization, surgical intervention, or even death. I also specifically mentioned the miss rate of colonoscopy of 5-10% in the best of all circumstances. The patient has agreed to sign an informed consent and elected to proceed with colonoscopy with possible intervention [i.e. polypectomy, stent placement, etc.] as indicated.      Orders This Visit:  No orders of the defined types were placed in this encounter.      Meds This Visit:  Requested Prescriptions     Signed Prescriptions Disp  Refills    Na Sulfate-K Sulfate-Mg Sulf (SUPREP BOWEL PREP KIT) 17.5-3.13-1.6 GM/177ML Oral Solution 177 mL 0     Sig: Take as directed       Imaging & Referrals:  None         Tita Jacobsen MD          This note was partially prepared using Dragon Medical voice recognition dictation software. As a result, errors may occur. When identified, these errors have been corrected. While every attempt is made to correct errors during dictation, discrepancies may still exist.

## 2024-12-19 NOTE — TELEPHONE ENCOUNTER
NEXT AVAILABLE PROVIDER.    Schedulers- Patient was seen in office today, please call patient to schedule procedure per providers orders below. I reviewed and handed a copy of prep instructions with patient in office as well as medications. Patient is aware of different locations and our providers possibly booking out. No further questions asked.      1. Schedule colonoscopy with MAC with the next available provider [Diagnosis: hematochezia, diarrhea, change in bowel habits]     2.  bowel prep from pharmacy (split dose Suprep)     3. Continue all medications for procedure     4. Read all bowel prep instructions carefully     5. AVOID seeds, nuts, popcorn, raw fruits and vegetables (cooked is okay) for 2-3 days before procedure     6. If you start any NEW medication after your visit today, please notify us. Certain medications will need to be held before the procedure, or the procedure cannot be performed.      7. If symptoms worsen in the interim, recommend going to the ER for an admission and expedited colonoscopy.

## 2024-12-19 NOTE — PATIENT INSTRUCTIONS
1. Schedule colonoscopy with MAC with the next available provider [Diagnosis: hematochezia, diarrhea, change in bowel habits]    2.  bowel prep from pharmacy (split dose Suprep)    3. Continue all medications for procedure    4. Read all bowel prep instructions carefully    5. AVOID seeds, nuts, popcorn, raw fruits and vegetables (cooked is okay) for 2-3 days before procedure    6. If you start any NEW medication after your visit today, please notify us. Certain medications will need to be held before the procedure, or the procedure cannot be performed.     7. If symptoms worsen in the interim, recommend going to the ER for an admission and expedited colonoscopy.         
(3) slightly limited

## 2024-12-20 NOTE — TELEPHONE ENCOUNTER
Scheduled for:  Colonoscopy 27205    Provider Name:  Dr Connell    Date:  12/31/2024    Location:   [facility]     Sedation:  MAC    Time:  1120    Prep:  Suprep    Meds/Allergies Reconciled?:  Physician reviewed     Diagnosis with codes:    Hematochezia [K92.1]   Diarrhea, unspecified type [R19.7]  Change in bowel habits [R19.4]       Was patient informed to call insurance with codes (Y/N):  Yes, I confirmed Medicare insurance with the patient.     Referral sent?:  N/A    EM or [facility] notified?:  I sent an electronic request to Endo Scheduling and received a confirmation today.      Medication Orders:  This patient verbally confirmed that she is not taking:   Iron, blood thinners, BP meds, and is not diabetic   No latex allergy, No PCN allergy and does not have a pacemaker     Misc Orders:       Further instructions given by staff:   I discussed the prep instructions with the patient which she verbally understood and is aware that I will send the instructions today via [employer]    Advised patient:    You will not be able to drive, operate machinery or make critical decisions the day of your procedure. Please make arrangements for transportation. You must have a  (age 18 or older) to accompany you, stay in the facility for the duration of your procedure and drive you home after the procedure.  You cannot use public transportation ([employer], Lyft, Taxi). The procedure involves sedation, and you will not be allowed to leave unaccompanied. Your procedure will not proceed forward if you're unable to confirm your  planned to escort you home.

## 2024-12-27 ENCOUNTER — OFFICE VISIT (OUTPATIENT)
Dept: OTOLARYNGOLOGY | Facility: CLINIC | Age: 79
End: 2024-12-27
Payer: MEDICARE

## 2024-12-27 DIAGNOSIS — R13.10 DYSPHAGIA, UNSPECIFIED TYPE: Primary | ICD-10-CM

## 2024-12-27 PROCEDURE — 99213 OFFICE O/P EST LOW 20 MIN: CPT | Performed by: OTOLARYNGOLOGY

## 2024-12-27 PROCEDURE — 31575 DIAGNOSTIC LARYNGOSCOPY: CPT | Performed by: OTOLARYNGOLOGY

## 2024-12-27 NOTE — PROGRESS NOTES
Jaz Thayer is a 79 year old female.    Chief Complaint   Patient presents with    Throat Problem     Patient is here due to swallowing issues x 3 years.  Reports having to clear throat often        HISTORY OF PRESENT ILLNESS  She presents with a history of dysphagia.  States that she seems to choke on certain types of food can be water purées and all types of food which makes her cough on occasion.  Previous swallow study demonstrated a 5 to 10 mm Zenker's diverticulum as well as esophageal motility issues in the lower esophagus.  Seen by Dr. Jacobsen of GI who recommended an upper Zenker's diverticulum to see if any management is indicated at this time.  She specifically denies any regurgitation states at no time has she ever brought up food after she swallowed it and states that her biggest concern is essentially just cough.  No history of aspiration.      Social History     Socioeconomic History    Marital status:    Tobacco Use    Smoking status: Never     Passive exposure: Never    Smokeless tobacco: Never   Vaping Use    Vaping status: Never Used   Substance and Sexual Activity    Alcohol use: Yes     Comment: Socially twice a month    Drug use: Never    Sexual activity: Not Currently   Other Topics Concern    Caffeine Concern Yes    Stress Concern No    Weight Concern No    Special Diet No    Exercise No    Seat Belt Yes       Family History   Problem Relation Age of Onset    Arthritis Father     Other (Other) Father         stroke    Dementia Father     Heart Disorder Mother     Other (Other) Mother         stroke    Stroke Mother     Other (Other) Sister         ALS    Dementia Sister     Stroke Sister        Past Medical History:    Allergic rhinitis    Anesthesia complication    difficulty waking up    Anxiety    Anxiety state    Arthritis    I take medication for RA    Back problem    Cataract    Esophageal reflux    Osteoarthritis    Problems with swallowing    Rheumatoid arthritis (HCC)     Sleep apnea    cpap    TIA (transient ischemic attack)    Visual impairment    reading glasses       Past Surgical History:   Procedure Laterality Date    Appendectomy  2015    Back surgery  2021     L4-5 extreme lateral interbody fusion with metallic cage and cadaver bone graft through the side, plus L4 inferior L3 laminectomy, foraminotomy, L4-5 discectomy and decompression of the spinal nerves with metallic L4-5 trans facet through the back    Cataract  2016    Surgery    Colonoscopy        1976    Upper gi endoscopy,exam           REVIEW OF SYSTEMS    System Neg/Pos Details   Constitutional Negative Fatigue, fever and weight loss.   ENMT Negative Drooling.   Eyes Negative Blurred vision and vision changes.   Respiratory Negative Dyspnea and wheezing.   Cardio Negative Chest pain, irregular heartbeat/palpitations and syncope.   GI Negative Abdominal pain and diarrhea.   Endocrine Negative Cold intolerance and heat intolerance.   Neuro Negative Tremors.   Psych Negative Anxiety and depression.   Integumentary Negative Frequent skin infections, pigment change and rash.   Hema/Lymph Negative Easy bleeding and easy bruising.           PHYSICAL EXAM    There were no vitals taken for this visit.       Constitutional Normal Overall appearance - Normal.   Psychiatric Normal Orientation - Oriented to time, place, person & situation. Appropriate mood and affect.   Neck Exam Normal Inspection - Normal. Palpation - Normal. Parotid gland - Normal. Thyroid gland - Normal.   Eyes Normal Conjunctiva - Right: Normal, Left: Normal. Pupil - Right: Normal, Left: Normal. Fundus - Right: Normal, Left: Normal.   Neurological Normal Memory - Normal. Cranial nerves - Cranial nerves II through XII grossly intact.   Head/Face Normal Facial features - Normal. Eyebrows - Normal. Skull - Normal.        Nasopharynx Normal External nose - Normal. Lips/teeth/gums - Normal. Tonsils - Normal. Oropharynx - Normal.    Ears Normal Inspection - Right: Normal, Left: Normal. Canal - Right: Normal, Left: Normal. TM - Right: Normal, Left: Normal.   Skin Normal Inspection - Normal.        Lymph Detail Normal Submental. Submandibular. Anterior cervical. Posterior cervical. Supraclavicular.        Nose/Mouth/Throat Normal External nose - Normal. Lips/teeth/gums - Normal. Tonsils - Normal. Oropharynx - Normal.   Nose/Mouth/Throat Normal Nares - Right: Normal Left: Normal. Septum -Normal  Turbinates - Right: Normal, Left: Normal.   Procedures:  Endoscopy/Laryngoscopy  Pre-Procedure Care: Verbal consent was obtained. Procedure/risks were explained. Questions were answered. Correct patient identified. Correct side and site confirmed.      A topical spray of ).25% Neosynephrine was sprayed into the nose.    Laryngoscopy:  Flexible Fiberoptic Laryngoscopy: A diagnostic flexible fiberoptic laryngoscopy was performed. The flexible fiberoptic laryngoscope was placed into the nose or mouthand advanced  into the interior of the larynx. A thorough examination of the interior of the larynx was performed.   Findings were as follows.       Hypopharynx/Larynx:  Epiglottis is normal.  Arytenoids:  Bilateral: Arytenoids are normal.  Vocal folds-false  Bilateral: Vocal folds (false) are normal.  Vocal folds-true  Bilateral: Vocal folds (true) are normal.  Pyriform sinus:  Bilateral: Pyriform sinuses are normal.  Base of tongue is normal in appearance.  There is no airway obstruction.   General comments: Normal exam              Current Outpatient Medications:     polyethylene glycol, PEG 3350-KCl-NaBcb-NaCl-NaSulf, 236 g Oral Recon Soln, Take 4,000 mL by mouth As Directed. Take 2,000 mL the night before your procedure and 2,000 mL the morning of your procedure., Disp: 1 each, Rfl: 0    pantoprazole 40 MG Oral Tab EC, Take 1 tablet (40 mg total) by mouth every morning before breakfast., Disp: 90 tablet, Rfl: 2    ENBREL SURECLICK 50 MG/ML Subcutaneous  Solution Auto-injector, INJECT 1 PEN UNDER THE SKIN EVERY 7 DAYS, Disp: 4 each, Rfl: 2    Mirabegron ER (MYRBETRIQ) 50 MG Oral Tablet 24 Hr, Take 1 tablet (50 mg total) by mouth daily., Disp: 30 tablet, Rfl: 11    LORazepam 1 MG Oral Tab, Take 1 tablet (1 mg total) by mouth daily as needed for Anxiety., Disp: 30 tablet, Rfl: 3    gabapentin 100 MG Oral Cap, Take 1 capsule (100 mg total) by mouth 3 (three) times daily., Disp: 90 capsule, Rfl: 3    Multiple Vitamin (ONE-DAILY MULTI VITAMINS) Oral Tab, Take 1 tablet by mouth daily., Disp: , Rfl:     fluticasone propionate 50 MCG/ACT Nasal Suspension, 2 sprays by Nasal route daily., Disp: 3 each, Rfl: 1  ASSESSMENT AND PLAN    1. Dysphagia, unspecified type  Per previous video swallow from 2021 demonstrating a small Zenker's diverticulum.  In addition there was noted to be slow esophageal motility in the lower esophagus during that study.  Last video swallow suggested a size of 5 mm to 10 mm in size which is remarkably small and unclear if that would even impact the patient's ability to swallow consistently.  He specifically denies any issues with regurgitating food and states that her cough and choking episodes can be from simply drinking water or puréed materials.  Since it has been several years since her last study I did recommend that she undergo a repeat video swallow as well as esophagram she will return to see me after the studies to discuss further management.  - XR UGI/ESOPHAGUS DOUBLE CONTRAST (CPT=74246); Future  - XR VIDEO SWALLOW (CPT=74230); Future        This note was prepared using Dragon Medical voice recognition dictation software. As a result errors may occur. When identified these errors have been corrected. While every attempt is made to correct errors during dictation discrepancies may still exist    Bradley Wan MD    12/27/2024    5:19 PM

## 2024-12-31 ENCOUNTER — HOSPITAL ENCOUNTER (OUTPATIENT)
Facility: HOSPITAL | Age: 79
Setting detail: HOSPITAL OUTPATIENT SURGERY
Discharge: HOME | End: 2024-12-31
Attending: INTERNAL MEDICINE | Admitting: INTERNAL MEDICINE
Payer: MEDICARE

## 2024-12-31 ENCOUNTER — ANESTHESIA EVENT (OUTPATIENT)
Dept: ENDOSCOPY | Facility: HOSPITAL | Age: 79
End: 2024-12-31
Payer: MEDICARE

## 2024-12-31 ENCOUNTER — ANESTHESIA (OUTPATIENT)
Dept: ENDOSCOPY | Facility: HOSPITAL | Age: 79
End: 2024-12-31
Payer: MEDICARE

## 2024-12-31 VITALS
WEIGHT: 179 LBS | TEMPERATURE: 97 F | BODY MASS INDEX: 29.82 KG/M2 | OXYGEN SATURATION: 96 % | SYSTOLIC BLOOD PRESSURE: 143 MMHG | HEIGHT: 65 IN | RESPIRATION RATE: 13 BRPM | DIASTOLIC BLOOD PRESSURE: 73 MMHG | HEART RATE: 64 BPM

## 2024-12-31 DIAGNOSIS — K92.1 HEMATOCHEZIA: ICD-10-CM

## 2024-12-31 DIAGNOSIS — R19.7 DIARRHEA, UNSPECIFIED TYPE: ICD-10-CM

## 2024-12-31 DIAGNOSIS — R19.4 CHANGE IN BOWEL HABITS: ICD-10-CM

## 2024-12-31 PROCEDURE — 45380 COLONOSCOPY AND BIOPSY: CPT | Performed by: INTERNAL MEDICINE

## 2024-12-31 PROCEDURE — 0DBK8ZX EXCISION OF ASCENDING COLON, VIA NATURAL OR ARTIFICIAL OPENING ENDOSCOPIC, DIAGNOSTIC: ICD-10-PCS | Performed by: INTERNAL MEDICINE

## 2024-12-31 PROCEDURE — 0DBE8ZX EXCISION OF LARGE INTESTINE, VIA NATURAL OR ARTIFICIAL OPENING ENDOSCOPIC, DIAGNOSTIC: ICD-10-PCS | Performed by: INTERNAL MEDICINE

## 2024-12-31 PROCEDURE — 45385 COLONOSCOPY W/LESION REMOVAL: CPT | Performed by: INTERNAL MEDICINE

## 2024-12-31 RX ORDER — SODIUM CHLORIDE, SODIUM LACTATE, POTASSIUM CHLORIDE, CALCIUM CHLORIDE 600; 310; 30; 20 MG/100ML; MG/100ML; MG/100ML; MG/100ML
INJECTION, SOLUTION INTRAVENOUS CONTINUOUS
Status: DISCONTINUED | OUTPATIENT
Start: 2024-12-31 | End: 2024-12-31

## 2024-12-31 RX ORDER — LIDOCAINE HYDROCHLORIDE 10 MG/ML
INJECTION, SOLUTION EPIDURAL; INFILTRATION; INTRACAUDAL; PERINEURAL AS NEEDED
Status: DISCONTINUED | OUTPATIENT
Start: 2024-12-31 | End: 2024-12-31 | Stop reason: SURG

## 2024-12-31 RX ADMIN — LIDOCAINE HYDROCHLORIDE 50 MG: 10 INJECTION, SOLUTION EPIDURAL; INFILTRATION; INTRACAUDAL; PERINEURAL at 10:00:00

## 2024-12-31 RX ADMIN — SODIUM CHLORIDE, SODIUM LACTATE, POTASSIUM CHLORIDE, CALCIUM CHLORIDE: 600; 310; 30; 20 INJECTION, SOLUTION INTRAVENOUS at 09:56:00

## 2024-12-31 NOTE — OPERATIVE REPORT
COLONOSCOPY REPORT    Jaz Thayer     1945 Age 79 year old   PCP Darryl Mccray MD Endoscopist Fabiana Connell MD       Date of procedure: 24    Procedure: Colonoscopy w/ biopsies, cold snare polypectomy    Pre-operative diagnosis: elevated fecal calprotectin, diarrhea, hematochezia     Post-operative diagnosis: ulcerative colitis, colon polyp, diverticulosis     Medications: MAC    Withdrawal time: 30 minutes    Procedure:  Informed consent was obtained from the patient after the risks of the procedure were discussed, including but not limited to bleeding, perforation, aspiration, infection, or possibility of a missed lesion. After discussions of the risks/benefits and alternatives to this procedure, as well as the planned sedation, the patient was placed in the left lateral decubitus position and begun on continuous blood pressure pulse oximetry and EKG monitoring and this was maintained throughout the procedure. Once an adequate level of sedation was obtained a digital rectal exam was completed. Then the lubricated tip of the [id number] diagnostic video colonoscope was inserted and advanced without difficulty to the cecum using water immersion and CO2 insufflation technique. The cecum was identified by localizing the trifold, the appendix and the ileocecal valve. Withdrawal was begun with thorough washing and careful examination of the colonic walls and folds. A routine second examination of the cecum/ascending colon was performed. Photodocumentation was obtained. The bowel prep was fair. Views of the colon were good with washing. I then carefully withdrew the instrument from the patient who tolerated the procedure well.     Complications: none.    Findings:   1. One polyp noted as follows:      A. 6 mm polyp in the ascending colon; sessile morphology; cold snare polypectomy performed, polyp retrieved.    2. Diverticulosis: mild in the left colon.    3. Ileocecal valve appeared  healthy and normal. Biopsied.     4. There was evidence of mild-moderate inflammation from the rectum to 30 cm from the anal verge concerning for ulcerative colitis, biopsied. The remainder of the colon appeared normal. Biopsied.     5. Retroflexion deferred in the setting of active rectal inflammation. No abnormalities noted on careful forward withdrawal.     6. AURORA: normal rectal tone, no masses palpated.     Impression:   Mild-moderate inflammation involving the rectum and sigmoid to 30 cm from the anal verge, likely ulcerative colitis. Biopsied.   Normal terminal ileum. Biopsied.  One sub-centimeter polyp removed.    The colon was otherwise normal with glistening mucosa and intact vascular pattern. Biopsied.     Recommend:  Await pathology. The interval for the next colonoscopy will be determined after reviewing pathology. If new signs or symptoms develop, colonoscopy may need to be repeated sooner.   Low fiber diet.  Monitor for blood in the stool. If having more than just tinge of blood, call office or go to the ER.  Avoid NSAIDs (motrin, ibuprofen, aleve, advil, naproxen, midol, naprosyn, excedrin).     >>>If tissue was obtained and you have not received your pathology results either by phone or letter within 2 weeks, please call our office at [phone].    Specimens: colon polyp, TI biopsies, right colon biopsies, transverse colon biopsies, left colon biopsies (above the level of inflammation), sigmoid/rectal biopsies (colitis)     Blood loss: <1 ml

## 2024-12-31 NOTE — ANESTHESIA POSTPROCEDURE EVALUATION
Patient: Jaz Thayer    Procedure Summary       Date: 12/31/24 Room / Location: [facility] ENDOSCOPY 01 / [facility] ENDOSCOPY    Anesthesia Start: 0956 Anesthesia Stop: 1048    Procedure: COLONOSCOPY Diagnosis:       Hematochezia      Diarrhea, unspecified type      Change in bowel habits      (diverticulosis, polyp, colitis)    Surgeons: Fabiana Connell MD Anesthesiologist: Becki Salomon CRNA    Anesthesia Type: MAC ASA Status: 2            Anesthesia Type: MAC    Vitals Value Taken Time   /68 12/31/24 1048   Temp 97 °F (36.1 °C) 12/31/24 1048   Pulse 71 12/31/24 1048   Resp 24 12/31/24 1048   SpO2 99 % 12/31/24 1048   Vitals shown include unfiled device data.    [facility] AN Post Evaluation:   Patient Evaluated in PACU  Patient Participation: complete - patient participated  Level of Consciousness: awake and alert  Pain Score: 0  Pain Management: adequate  Airway Patency:patent  Yes    Nausea/Vomiting: none  Cardiovascular Status: acceptable  Respiratory Status: acceptable  Postoperative Hydration stable      Becki Salomon CRNA  12/31/2024 10:49 AM

## 2024-12-31 NOTE — H&P
History & Physical Examination    Patient Name: Jaz Thayer  MRN: [id number]  [facility]: [id number]  YOB: 1947    Diagnosis: Change in bowel habits, Hematochezia, Diarrhea, Elevated stool calprotectin (>800)     Prescriptions Prior to Admission[1]  No current facility-administered medications for this encounter.       Allergies: Allergies[2]    Past Medical History:    Allergic rhinitis    Anesthesia complication    difficulty waking up    Anxiety    Anxiety state    Arthritis    I take medication for RA    Back problem    Cataract    Esophageal reflux    Osteoarthritis    Problems with swallowing    Rheumatoid arthritis (HCC)    Sleep apnea    cpap    TIA (transient ischemic attack)    Visual impairment    reading glasses     Past Surgical History:   Procedure Laterality Date    Appendectomy  2015    Back surgery  2021     L4-5 extreme lateral interbody fusion with metallic cage and cadaver bone graft through the side, plus L4 inferior L3 laminectomy, foraminotomy, L4-5 discectomy and decompression of the spinal nerves with metallic L4-5 trans facet through the back    Cataract  2016    Surgery    Colonoscopy        1976    Upper gi endoscopy,exam       Family History   Problem Relation Age of Onset    Arthritis Father     Other (Other) Father         stroke    Dementia Father     Heart Disorder Mother     Other (Other) Mother         stroke    Stroke Mother     Other (Other) Sister         ALS    Dementia Sister     Stroke Sister      Social History     Tobacco Use    Smoking status: Never     Passive exposure: Never    Smokeless tobacco: Never   Substance Use Topics    Alcohol use: Yes     Comment: Socially twice a month       SYSTEM Check if Review is Normal Check if Physical Exam is Normal If not normal, please explain:   HEENT [X ] [ X]    NECK  [X ] [ X]    HEART [X ] [ X]    LUNGS [X ] [ X]    ABDOMEN [X ] [ X]    EXTREMITIES [X ] [ X]    OTHER        I have  discussed the risks and benefits and alternatives of the procedure with the patient/family.  They understand and agree to proceed with plan of care.   I have reviewed the History and Physical done within the last 30 days.  Any changes noted above.    Fabiana Connell MD                 [1]   Medications Prior to Admission   Medication Sig Dispense Refill Last Dose/Taking    polyethylene glycol, PEG 3350-KCl-NaBcb-NaCl-NaSulf, 236 g Oral Recon Soln Take 4,000 mL by mouth As Directed. Take 2,000 mL the night before your procedure and 2,000 mL the morning of your procedure. 1 each 0 Taking    pantoprazole 40 MG Oral Tab EC Take 1 tablet (40 mg total) by mouth every morning before breakfast. 90 tablet 2 Taking    ENBREL SURECLICK 50 MG/ML Subcutaneous Solution Auto-injector INJECT 1 PEN UNDER THE SKIN EVERY 7 DAYS 4 each 2 Taking    Mirabegron ER (MYRBETRIQ) 50 MG Oral Tablet 24 Hr Take 1 tablet (50 mg total) by mouth daily. 30 tablet 11 Taking    LORazepam 1 MG Oral Tab Take 1 tablet (1 mg total) by mouth daily as needed for Anxiety. 30 tablet 3 Taking As Needed    gabapentin 100 MG Oral Cap Take 1 capsule (100 mg total) by mouth 3 (three) times daily. 90 capsule 3 Taking    Multiple Vitamin (ONE-DAILY MULTI VITAMINS) Oral Tab Take 1 tablet by mouth daily.   Taking    fluticasone propionate 50 MCG/ACT Nasal Suspension 2 sprays by Nasal route daily. 3 each 1 Taking   [2] No Known Allergies

## 2024-12-31 NOTE — ANESTHESIA PREPROCEDURE EVALUATION
Anesthesia PreOp Note    HPI:     Jaz Thayer is a 79 year old female who presents for preoperative consultation requested by: Fabiana Connell MD    Date of Surgery: 10/4/2023    Procedure(s):  ESOPHAGOGASTRODUODENOSCOPY  Indication: Oropharyngeal dysphagia/Cough, unspecified type/Gastroesophageal reflux disease without esophagitis    Relevant Problems   No relevant active problems       NPO:  Last Liquid Consumption Date: 12/31/24  Last Liquid Consumption Time: 0200  Last Solid Consumption Date: 12/30/24  Last Solid Consumption Time: 1330  Last Liquid Consumption Date: 12/31/24          History Review:  Patient Active Problem List    Diagnosis Date Noted    Encounter for therapeutic drug monitoring 04/18/2024    Encounter for other specified special examinations 04/18/2024    Calcific tendonitis of right shoulder 04/18/2024    Vaginal atrophy 01/23/2024    Urge incontinence 11/28/2023    Stage 3a chronic kidney disease (HCC) 07/21/2023    Stress incontinence 07/20/2023    L4-5 grade 2 spondylolisthesis 08/06/2021    L4-5 left moderate-severe, L5-S1 left severe/right moderate foraminal stenosis 08/06/2021    L4-5 large central herniated disc with moderate left cephald extruded fragment 08/06/2021    L4-5 severe, L3-4 moderate central stenosis 08/06/2021    Mixed hyperlipidemia 12/19/2020    Seropositive rheumatoid arthritis of multiple sites (HCC) 12/16/2015       Past Medical History:    Allergic rhinitis    Anesthesia complication    difficulty waking up    Anxiety    Anxiety state    Arthritis    I take medication for RA    Back problem    Cataract    Esophageal reflux    Osteoarthritis    Problems with swallowing    Rheumatoid arthritis (HCC)    Sleep apnea    cpap    TIA (transient ischemic attack)    Visual impairment    reading glasses       Past Surgical History:   Procedure Laterality Date    Appendectomy  09/16/2015    Back surgery  09/14/2021     L4-5 extreme lateral interbody  fusion with metallic cage and cadaver bone graft through the side, plus L4 inferior L3 laminectomy, foraminotomy, L4-5 discectomy and decompression of the spinal nerves with metallic L4-5 trans facet through the back    Cataract  2016    Surgery    Colonoscopy        1976    Upper gi endoscopy,exam         Medications Prior to Admission   Medication Sig Dispense Refill Last Dose/Taking    polyethylene glycol, PEG 3350-KCl-NaBcb-NaCl-NaSulf, 236 g Oral Recon Soln Take 4,000 mL by mouth As Directed. Take 2,000 mL the night before your procedure and 2,000 mL the morning of your procedure. 1 each 0 Taking    pantoprazole 40 MG Oral Tab EC Take 1 tablet (40 mg total) by mouth every morning before breakfast. 90 tablet 2 Taking    ENBREL SURECLICK 50 MG/ML Subcutaneous Solution Auto-injector INJECT 1 PEN UNDER THE SKIN EVERY 7 DAYS 4 each 2 2024    Mirabegron ER (MYRBETRIQ) 50 MG Oral Tablet 24 Hr Take 1 tablet (50 mg total) by mouth daily. 30 tablet 11 Taking    LORazepam 1 MG Oral Tab Take 1 tablet (1 mg total) by mouth daily as needed for Anxiety. 30 tablet 3 Taking As Needed    gabapentin 100 MG Oral Cap Take 1 capsule (100 mg total) by mouth 3 (three) times daily. 90 capsule 3 Taking    Multiple Vitamin (ONE-DAILY MULTI VITAMINS) Oral Tab Take 1 tablet by mouth daily.   Taking    fluticasone propionate 50 MCG/ACT Nasal Suspension 2 sprays by Nasal route daily. 3 each 1 Taking     Current Facility-Administered Medications Ordered in Epic   Medication Dose Route Frequency Provider Last Rate Last Admin    lactated ringers infusion   Intravenous Continuous Fabiana Connell MD         No current [facility]-ordered outpatient medications on file.       No Known Allergies    Family History   Problem Relation Age of Onset    Arthritis Father     Other (Other) Father         stroke    Dementia Father     Heart Disorder Mother     Other (Other) Mother         stroke    Stroke Mother     Other (Other)  Sister         ALS    Dementia Sister     Stroke Sister      Social History     Socioeconomic History    Marital status:    Tobacco Use    Smoking status: Never     Passive exposure: Never    Smokeless tobacco: Never   Vaping Use    Vaping status: Never Used   Substance and Sexual Activity    Alcohol use: Yes     Comment: Socially twice a month    Drug use: Never    Sexual activity: Not Currently   Other Topics Concern    Caffeine Concern Yes    Stress Concern No    Weight Concern No    Special Diet No    Exercise No    Seat Belt Yes       Available pre-op labs reviewed.  Lab Results   Component Value Date    WBC 4.9 12/13/2024    RBC 4.94 12/13/2024    HGB 11.8 (L) 12/13/2024    HCT 36.8 12/13/2024    MCV 74.5 (L) 12/13/2024    MCH 23.9 (L) 12/13/2024    MCHC 32.1 12/13/2024    RDW 14.8 12/13/2024    .0 12/13/2024     Lab Results   Component Value Date     12/13/2024    K 4.3 12/13/2024     12/13/2024    CO2 29.0 12/13/2024    BUN 13 12/13/2024    CREATSERUM 1.11 (H) 12/13/2024     (H) 12/13/2024    CA 9.8 12/13/2024          Vital Signs:  Body mass index is 29.79 kg/m².   height is 1.651 m (5' 5\") and weight is 81.2 kg (179 lb). Her blood pressure is 147/71 and her pulse is 66. Her respiration is 18 and oxygen saturation is 94%.   Vitals:    12/24/24 1118 12/31/24 0915   BP:  147/71   Pulse:  66   Resp:  18   SpO2:  94%   Weight: 81.2 kg (179 lb)    Height: 1.651 m (5' 5\")         Anesthesia Evaluation     Patient summary reviewed and Nursing notes reviewed    History of anesthetic complications   Airway   Mallampati: I  TM distance: >3 FB  Neck ROM: full  Dental      Pulmonary    (+) sleep apnea    ROS comment: Incessant cough  Cardiovascular - negative ROS  Exercise tolerance: good    Rhythm: regular    Neuro/Psych - negative ROS   (+)  TIA, anxiety/panic attacks,        GI/Hepatic/Renal - negative ROS   (+) GERD    Endo/Other - negative ROS   (+) arthritis  Abdominal      Other  findings:   8/2021 ECHO  Study Conclusions   1. Left ventricle: The cavity size was normal. Wall thickness was      increased in a pattern of mild LVH. Systolic function was      normal. The estimated ejection fraction was 65-70%, by visual      assessment. Wall motion was normal; there were no regional wall      motion abnormalities. Doppler parameters are consistent with      abnormal left ventricular relaxation (grade 1 diastolic      dysfunction).               Anesthesia Plan:   ASA:  2  Plan:   MAC  Informed Consent Plan and Risks Discussed With:  Patient      I have informed Jazgallo Thayer and/or legal guardian or family member of the nature of the anesthetic plan, benefits, risks including possible dental damage if relevant, major complications, and any alternative forms of anesthetic management.   All of the patient's questions were answered to the best of my ability. The patient desires the anesthetic management as planned.  Jerel Zhao CRNA  10/4/2023 1:57 PM  Present on Admission:  **None**

## 2024-12-31 NOTE — DISCHARGE INSTRUCTIONS
Home Care Instructions for Colonoscopy  with Sedation    Diet:  - Resume your regular diet as tolerated unless otherwise instructed.  - Start with light meals to minimize bloating.  - Do not drink alcohol today.    Medication:  - If you have questions about resuming your normal medications, please contact your Primary Care Physician.    Activities:  - Take it easy today. Do not return to work today.  - Do not drive today.  - Do not operate any machinery today (including kitchen equipment).    Colonoscopy:  - You may notice some rectal \"spotting\" (a little blood on the toilet tissue) for a day or two after the exam. This is normal.  - If you experience any rectal bleeding (not spotting), persistent tenderness or sharp severe abdominal pains, oral temperature over 100 degrees Fahrenheit, light-headedness or dizziness, or any other problems, contact your doctor.      **If unable to reach your doctor, please go to the [facility] Emergency Room**    - Your referring physician will receive a full report of your examination.  - If you do not hear from your doctor's office within two weeks of your biopsy, please call them for your results.    You may be able to see your laboratory results in [employer] between 4 and 7 business days.  In some cases, your physician may not have viewed the results before they are released to [employer].  If you have questions regarding your results contact the physician who ordered the test/exam by phone or via [employer] by choosing \"Ask a Medical Question.\"

## 2025-01-07 ENCOUNTER — PATIENT MESSAGE (OUTPATIENT)
Dept: GASTROENTEROLOGY | Facility: CLINIC | Age: 80
End: 2025-01-07

## 2025-01-09 ENCOUNTER — TELEPHONE (OUTPATIENT)
Facility: CLINIC | Age: 80
End: 2025-01-09

## 2025-01-09 RX ORDER — MESALAMINE 4 G/60ML
1 SUSPENSION RECTAL EVERY EVENING
Qty: 30 ENEMA | Refills: 11 | Status: SHIPPED | OUTPATIENT
Start: 2025-01-09

## 2025-01-09 NOTE — TELEPHONE ENCOUNTER
Jaz RIBERA  Gi Clinical Staff (supporting Tita Jacobsen MD)2 days ago     CS  Good evening I am still having severe Bloody painful diarrhea, is there any medication I can take to relieve this problem? What are the results from my colonoscopy.        No questionnaires available.

## 2025-01-09 NOTE — TELEPHONE ENCOUNTER
She is having hematochezia three times a day.  She otherwise feels OK. We reviewed her pathology, which is consistent with mild ulcerative proctosigmoiditis. Recommend starting mesalamine enema 4 g at bedtime and follow up with me in 2 weeks.  She expressed understanding and is amenable with the plan.    GI staff - please set up follow up with me in 2 weeks. OK to overbook or use a res-24 slot. Thanks! SS

## 2025-01-16 NOTE — TELEPHONE ENCOUNTER
I spoke to the patient    Patient scheduled for office visit on 1/30/2025 at 11:30 am    Location, date and time verified with the patient    Patient verbalized understanding and has no further questions at this time    Your appointments       Jan 30, 2025 11:30 AM CST Follow Up Visit with Tita Jacobsen MD [facility] ([facility])

## 2025-01-23 ENCOUNTER — HOSPITAL ENCOUNTER (OUTPATIENT)
Dept: GENERAL RADIOLOGY | Facility: HOSPITAL | Age: 80
Discharge: HOME | End: 2025-01-23
Attending: OTOLARYNGOLOGY
Payer: MEDICARE

## 2025-01-23 DIAGNOSIS — R13.10 DYSPHAGIA, UNSPECIFIED TYPE: ICD-10-CM

## 2025-01-23 PROCEDURE — 74230 X-RAY XM SWLNG FUNCJ C+: CPT | Performed by: OTOLARYNGOLOGY

## 2025-01-23 PROCEDURE — 74246 X-RAY XM UPR GI TRC 2CNTRST: CPT | Performed by: OTOLARYNGOLOGY

## 2025-01-23 PROCEDURE — 92611 MOTION FLUOROSCOPY/SWALLOW: CPT

## 2025-01-23 NOTE — PATIENT INSTRUCTIONS
VIDEO SWALLOW STUDY    Diet Recommendations:  Solids: Regular  Liquids: Thin    You have a Zenker's diverticulum which is a small pouch which collects food and liquid in your upper esophagus.    Recommended compensatory strategies:   End meals with water.  Try tilting your body forward or to the side after meals in attempt to clear the material from the diverticulum.  Be ready to cough.     Medication Administration:  Take a few drinks of water or spoonfuls of yogurt/applesauce/pudding before taking pills.  This will allow other material to fill up the diverticulum first and will reduce the chance of pills remaining in the diverticulum (as it will already be filled).    Further Follow-up:  Follow up with Dr. Mccray and/or  to discuss options regarding the Zenker's diverticulum.     Carina Bedolla MA/[facility]-SLP  Speech Language Pathologist  [facility]  [phone]

## 2025-01-23 NOTE — PROGRESS NOTES
ADULT VIDEOFLUOROSCOPIC SWALLOWING STUDY       ADULT VIDEOFLUOROSCOPIC SWALLOWING STUDY:   Referring Physician: Savage      Radiologist: Dr. Enrique  Diagnosis: dysphagia    Date of Service: 1/23/2025     PATIENT SUMMARY   Chief Complaint: Pt c/o coughing when laying down at night and coughing during and after meals.  The cough is often aggressive and she has to leave the table.  Previous VFSS in July 2021 revealed a Zenker's diverticulum.  The patient did not recall having the VFSS.  Recent laryngoscopy was normal.  Recent colonoscopy revealed ulcerative colitis and diverticulosis.  Pt has had a little weight loss which she attributes to colitis.  She denies shortness of breath and odynophagia.    Current Diet: regular foods and liquids       Problem List  Active Problems:  Active Problems:    * No active hospital problems. *      Past Medical History  Past Medical History:    Allergic rhinitis    Anesthesia complication    difficulty waking up    Anxiety    Anxiety state    Arthritis    I take medication for RA    Back problem    Cataract    Esophageal reflux    Osteoarthritis    Problems with swallowing    Rheumatoid arthritis (HCC)    Sleep apnea    cpap    TIA (transient ischemic attack)    Visual impairment    reading glasses        Imaging results: No recent CXR    ASSESSMENT   DYSPHAGIA ASSESSMENT  Test completed in conjunction with Radiologist.   Food/Liquid Types Presented: puree, solid, and thin liquids.    Study Position and View:  Patient was seated upright and viewed laterally and A-P.    Pain Assessment: The patient reports pain at a level of 0/10.    Oral phase:  Oral phase was within normal limits with adequate bilabial seal and bolus containment, timely mastication and transit and no oral retention.    Pharyngeal phase:  The pharyngeal response triggered at the tongue base to valleculae for thin liquids and at the valleculae for puree and solids.  Delayed epiglottic inversion was observed  intermittently resulting in one episode of transient laryngeal penetration with consecutive drinks of thin liquids by cup.  No aspiration was observed.  Base of tongue retraction and hyolaryngeal excursion were adequate.  No pharyngeal retention remained.    Esophageal phase:   Zenker's diverticulum noted approximately at C7 at rest.  See radiologist's report and esophagram for further details.    Penetration Aspiration Scale: 2/8.  Material entered the airway, remained above the vocal cords and was ejected from the airway.    Overall Impression: Essentially normal oropharyngeal swallow, but with one episode of transient laryngeal penetration which was expelled from the laryngeal vestibule with successive swallows.  No aspiration was observed.  Zenker's diverticulum was again evident.  See radiologist's report for details.  At the end of the study the patient was asked to tilt forward with which she felt something coming out and caused her to clear her throat.      FCM category and level: Swallowing, 7  PLAN   Potential: Good    Diet Recommendations:  Solids: Regular  Liquids: Thin    Recommended compensatory strategies:   End meals with water.  Try tilting your body forward or to the side after meals in attempt to clear the material from the diverticulum.  Be ready to cough.     Medication Administration:  Take a few drinks of water or spoonfuls of yogurt/applesauce/pudding before taking pills.  This will allow other material to fill up the diverticulum first and will reduce the chance of pills remaining in the diverticulum (as it will already be filled).    Further Follow-up:  Follow up with Dr. Mccray and/or  to discuss options regarding the Zenker's diverticulum.  Dysphagia therapy is not warranted.        EDUCATION/INSTRUCTION  Reviewed results and recommendations with patient.  Written instructions were provided.  Agreement/Understanding verbalized and all questions answered to their apparent  satisfaction.      INTERDISCIPLINARY COMMUNICATION  Reviewed results with Radiologist; agreement verbalized.    Thank you for your referral.  If you have any questions, please contact me at [phone].    Carina Bedolla MA/FAROOQ-SLP  Speech Language Pathologist  [facility]  [phone]    Electronically signed by therapist: Carina Bedolla, SLP  Physician's certification required: No

## 2025-01-30 ENCOUNTER — OFFICE VISIT (OUTPATIENT)
Dept: GASTROENTEROLOGY | Facility: CLINIC | Age: 80
End: 2025-01-30
Payer: MEDICARE

## 2025-01-30 ENCOUNTER — LAB ENCOUNTER (OUTPATIENT)
Dept: LAB | Facility: HOSPITAL | Age: 80
End: 2025-01-30
Attending: INTERNAL MEDICINE
Payer: MEDICARE

## 2025-01-30 VITALS
DIASTOLIC BLOOD PRESSURE: 72 MMHG | HEIGHT: 65 IN | SYSTOLIC BLOOD PRESSURE: 122 MMHG | WEIGHT: 181 LBS | BODY MASS INDEX: 30.16 KG/M2

## 2025-01-30 DIAGNOSIS — K51.30 ULCERATIVE RECTOSIGMOIDITIS WITHOUT COMPLICATION (HCC): ICD-10-CM

## 2025-01-30 DIAGNOSIS — Z51.81 THERAPEUTIC DRUG MONITORING: ICD-10-CM

## 2025-01-30 DIAGNOSIS — Z51.81 THERAPEUTIC DRUG MONITORING: Primary | ICD-10-CM

## 2025-01-30 DIAGNOSIS — M05.79 SEROPOSITIVE RHEUMATOID ARTHRITIS OF MULTIPLE SITES (HCC): ICD-10-CM

## 2025-01-30 LAB
ALBUMIN SERPL-MCNC: 4.5 G/DL (ref 3.2–4.8)
ALBUMIN/GLOB SERPL: 1.5 {RATIO} (ref 1–2)
ALP LIVER SERPL-CCNC: 83 U/L
ALT SERPL-CCNC: 19 U/L
ANION GAP SERPL CALC-SCNC: 7 MMOL/L (ref 0–18)
AST SERPL-CCNC: 18 U/L (ref ?–34)
BILIRUB SERPL-MCNC: 0.5 MG/DL (ref 0.2–1.1)
BUN BLD-MCNC: 14 MG/DL (ref 9–23)
BUN/CREAT SERPL: 14.1 (ref 10–20)
CALCIUM BLD-MCNC: 9.6 MG/DL (ref 8.7–10.4)
CHLORIDE SERPL-SCNC: 104 MMOL/L (ref 98–112)
CO2 SERPL-SCNC: 26 MMOL/L (ref 21–32)
CREAT BLD-MCNC: 0.99 MG/DL
EGFRCR SERPLBLD CKD-EPI 2021: 58 ML/MIN/1.73M2 (ref 60–?)
FASTING STATUS PATIENT QL REPORTED: YES
GLOBULIN PLAS-MCNC: 3.1 G/DL (ref 2–3.5)
GLUCOSE BLD-MCNC: 97 MG/DL (ref 70–99)
OSMOLALITY SERPL CALC.SUM OF ELEC: 284 MOSM/KG (ref 275–295)
POTASSIUM SERPL-SCNC: 4.2 MMOL/L (ref 3.5–5.1)
PROT SERPL-MCNC: 7.6 G/DL (ref 5.7–8.2)
SODIUM SERPL-SCNC: 137 MMOL/L (ref 136–145)

## 2025-01-30 PROCEDURE — 99214 OFFICE O/P EST MOD 30 MIN: CPT | Performed by: INTERNAL MEDICINE

## 2025-01-30 PROCEDURE — 80053 COMPREHEN METABOLIC PANEL: CPT

## 2025-01-30 PROCEDURE — 36415 COLL VENOUS BLD VENIPUNCTURE: CPT

## 2025-01-30 RX ORDER — MEDROXYPROGESTERONE ACETATE 150 MG/ML
INJECTION, SUSPENSION INTRAMUSCULAR
Qty: 4 EACH | Refills: 2 | Status: SHIPPED | OUTPATIENT
Start: 2025-01-30

## 2025-01-30 RX ORDER — MESALAMINE 4 G/60ML
1 SUSPENSION RECTAL EVERY EVENING
Qty: 30 ENEMA | Refills: 11 | Status: SHIPPED | OUTPATIENT
Start: 2025-01-30

## 2025-01-30 NOTE — PATIENT INSTRUCTIONS
PLAN    - Have your blood work done today    - Please continue the nightly enemas for now    - Follow up in 3 weeks - we will plan to do blood work at your next visit

## 2025-01-30 NOTE — TELEPHONE ENCOUNTER
Requested Prescriptions     Pending Prescriptions Disp Refills    ENBREL SURECLICK 50 MG/ML Subcutaneous Solution Auto-injector [Pharmacy Med Name: ENBREL SURECLICK PF AUTOINJ 50MG/ML]  2     Sig: INJECT 1 PEN UNDER THE SKIN EVERY 7 DAYS     Future Appointments   Date Time Provider Department Center   1/30/2025 11:30 AM Tita Jacobsen MD [facility]   2/14/2025 10:40 [facility]   2/18/2025  2:00 PM Radha Koch MD [facility]       LOV: 4/18/24   Last Refilled:11/5/24 #4 2RF   Labs:  Component      Latest Ref Rng 9/23/2024   WBC      4.0 - 11.0 x10(3) uL 5.3    RBC      3.80 - 5.30 x10(6)uL 5.06    Hemoglobin      12.0 - 16.0 g/dL 12.2    Hematocrit      35.0 - 48.0 % 37.5    MCV      80.0 - 100.0 fL 74.1 (L)    MCH      26.0 - 34.0 pg 24.1 (L)    MCHC      31.0 - 37.0 g/dL 32.5    RDW-SD      35.1 - 46.3 fL 40.2    RDW      11.0 - 15.0 % 15.0    Platelet Count      150.0 - 450.0 10(3)uL 274.0    Prelim Neutrophil Abs      1.50 - 7.70 x10 (3) uL 2.50    Neutrophils Absolute      1.50 - 7.70 x10(3) uL 2.50    Lymphocytes Absolute      1.00 - 4.00 x10(3) uL 1.83    Monocytes Absolute      0.10 - 1.00 x10(3) uL 0.70    Eosinophils Absolute      0.00 - 0.70 x10(3) uL 0.17    Basophils Absolute      0.00 - 0.20 x10(3) uL 0.05    Immature Granulocyte Absolute      0.00 - 1.00 x10(3) uL 0.01    Neutrophils %      % 47.5    Lymphocytes %      % 34.8    Monocytes %      % 13.3    Eosinophils %      % 3.2    Basophils %      % 1.0    Immature Granulocyte %      % 0.2    C-REACTIVE PROTEIN      <1.00 mg/dL <0.40    SED RATE      0 - 30 mm/Hr 46 (H)       Legend:  (L) Low  (H) High        PLAN:  -Continue Enbrel weekly  - Prior lab work reviewed.  Will add acute hepatitis panel while on Enbrel  - Consult/evaluation communicated with referring physician/provider.     I will MyChart patient on receipt of above results.  Otherwise, patient to follow-up sometime in the fall with repeat  labs drawn prior.     Ryan Villarreal DO  4/18/2024   11:09 AM

## 2025-01-30 NOTE — PROGRESS NOTES
[facility] - Gastroenterology                                                                                                                 Chief Complaint   Patient presents with    Follow - Up       HPI:   Jaz Thayer is a 79 year old year-old female with history of psoriatic arthritis on enbrel here for the following:      Since last visit, pt had a CLN that showed mild uclerative proctosigmoiditis. She was started on mesalamine enema 4 g at bedtime.  She was  having hematochezia 3x/day and is down to a daily BM without any blood.    She is tolerating the enemas - she has to wait to have a BM in the morning before she can get on with day but otherwise, it's OK.     Her arthritis did not respond to Humira in the past. She wasn't tried on any other biologics to her knowledge.     Denies f/c, weight loss, abdominal pain, dysequilibrium, dizziness, lightheadedness, or any other symptoms.      Video swallow test 2021 - done for oropharyngeal dysphagia was unremarkable.     Denies family h/o CRC.     Prior endoscopies:  CLN over 10 years ago - negative for polyps.   FOBT negative 2019.      EGD 10/2023  Impression:  1. A 5-10 mm Zenker's diverticulum noted in the proximal esophagus. The patient's choking may be due to the Zenker's diverticulum.    2. Non-obstructing Schatzki's ring s/p disruption in four quadrants with cold forceps biopsies.    3. 3 cm hiatal hernia.   4. Mild gastritis biopsied.      Recommend:  1. Await pathology.  2. Schedule the esophagram when able.   3. Follow up with ENT after the esophagram to further discuss management of the Zenker's diverticulum.   4.  Call [phone] to set up an appointment with one of the allergy doctors for your persistent post nasal drip and congestion.   5. Start pantoprazole 40 mg daily for acid reflux.   6. Follow up with Dr. Jacobsen in after the esophagram is  done and after you see ENT and allergy.      Path  Final Diagnosis:      A. Gastric; biopsies:  Mild chronic inactive gastritis.  Diff-Quik stain (appropriate control reactivity) shows no definitive evidence of H. pylori-like microorganisms.  No evidence of dysplasia or carcinoma identified.     B. Esophagus; biopsies:   Fragments of squamous mucosa with features of reflux esophagitis.  Minute strips of associated gastric glandular type mucosa with mild chronic inflammation.  No evidence of intestinal metaplasia, dysplasia or carcinoma identified.       Soc:  -denies smoking  -denies heavy Etoh  -no illicit drug use    Wt Readings from Last 6 Encounters:   25 181 lb (82.1 kg)   24 179 lb (81.2 kg)   24 180 lb (81.6 kg)   24 179 lb (81.2 kg)   10/01/24 178 lb 6.4 oz (80.9 kg)   24 182 lb (82.6 kg)        History, Medications, Allergies, ROS:      Past Medical History:    Allergic rhinitis    Anesthesia complication    difficulty waking up    Anxiety    Anxiety state    Arthritis    I take medication for RA    Back problem    Cataract    Esophageal reflux    Osteoarthritis    Problems with swallowing    Rheumatoid arthritis (HCC)    Sleep apnea    cpap    TIA (transient ischemic attack)    Visual impairment    reading glasses      Past Surgical History:   Procedure Laterality Date    Appendectomy  2015    Back surgery  2021     L4-5 extreme lateral interbody fusion with metallic cage and cadaver bone graft through the side, plus L4 inferior L3 laminectomy, foraminotomy, L4-5 discectomy and decompression of the spinal nerves with metallic L4-5 trans facet through the back    Cataract  2016    Surgery    Colonoscopy      Colonoscopy N/A 2024    Dr. Connell; diverticulosis, polyp, colitis    Colonoscopy N/A 2024    Procedure: COLONOSCOPY;  Surgeon: Fabiana Connell MD;  Location: [facility] ENDOSCOPY    [facility]  1975    Upper gi endoscopy,exam         Family Hx:   Family History   Problem Relation Age of Onset    Arthritis Father     Other (Other) Father         stroke    Dementia Father     Heart Disorder Mother     Other (Other) Mother         stroke    Stroke Mother     Other (Other) Sister         ALS    Dementia Sister     Stroke Sister       Social History:   Social History     Socioeconomic History    Marital status:    Tobacco Use    Smoking status: Never     Passive exposure: Never    Smokeless tobacco: Never   Vaping Use    Vaping status: Never Used   Substance and Sexual Activity    Alcohol use: Yes     Comment: Socially twice a month    Drug use: Never    Sexual activity: Not Currently   Other Topics Concern    Caffeine Concern Yes    Stress Concern No    Weight Concern No    Special Diet No    Exercise No    Seat Belt Yes   Social History Narrative    The patient uses the following assistive device(s):  single-point cane.  temporary    The patient does live in a home with stairs.        Medications (Active prior to today's visit):  Current Outpatient Medications   Medication Sig Dispense Refill    Etanercept (ENBREL SURECLICK) 50 MG/ML Subcutaneous Solution Auto-injector INJECT 1 PEN UNDER THE SKIN EVERY 7 DAYS 4 each 2    Mesalamine 4 g Rectal Enema Place 1 enema (4 g total) rectally every evening. 30 enema 11    pantoprazole 40 MG Oral Tab EC Take 1 tablet (40 mg total) by mouth every morning before breakfast. 90 tablet 2    Mirabegron ER (MYRBETRIQ) 50 MG Oral Tablet 24 Hr Take 1 tablet (50 mg total) by mouth daily. 30 tablet 11    LORazepam 1 MG Oral Tab Take 1 tablet (1 mg total) by mouth daily as needed for Anxiety. 30 tablet 3    Multiple Vitamin (ONE-DAILY MULTI VITAMINS) Oral Tab Take 1 tablet by mouth daily.      fluticasone propionate 50 MCG/ACT Nasal Suspension 2 sprays by Nasal route daily. 3 each 1    gabapentin 100 MG Oral Cap Take 1 capsule (100 mg total) by mouth 3 (three) times daily. (Patient not taking: Reported on  1/30/2025) 90 capsule 3       Allergies:  No Known Allergies    ROS:   CONSTITUTIONAL:  negative for fevers, rigors  EYES:  negative for diplopia   RESPIRATORY:  negative for severe shortness of breath  CARDIOVASCULAR:  negative for crushing sub-sternal chest pain  GASTROINTESTINAL:  see HPI  GENITOURINARY:  negative for dysuria or gross hematuria  INTEGUMENT/BREAST:  SKIN:  negative for jaundice   ALLERGIC/IMMUNOLOGIC:  negative for hay fever  ENDOCRINE:  negative for cold intolerance and heat intolerance  MUSCULOSKELETAL:  negative for joint effusion/severe erythema  BEHAVIOR/PSYCH:  negative for psychotic behavior      PHYSICAL EXAM:   Blood pressure 122/72, height 5' 5\" (1.651 m), weight 181 lb (82.1 kg).    GEN - Patient appears comfortable and in no acute discomfort  ENT - MMM  EYES - the sclera appears anicteric  CV - no edema  RESP -  No increased work of breathing  ABDOMEN - soft, non-tender exam in all quadrants without rigidity or guarding, non-distended, no abnormal bowel sounds noted, no masses are palpated  SKIN - No jaundice  NEURO - Alert and appropriate, and gross movements of extremities normal  PSYCH - normal affect, non-agitated        Labs/Imaging:     Patient's pertinent labs and imaging were reviewed and discussed with patient today.      .  ASSESSMENT/PLAN:   79 F with h/o psoriatic arthritis on enbrel here for the following:    Ulcerative proctosigmoiditis   H/o CKD on mesalamine enemas  H/o psoriatic arthritis  HPI:  pt developed hematochezia 9/2024, calprotectin was > 800 with normal CRP, and CLN 12/2024 showed mild ulcerative pancolitis up to 30 cm from the anal verge.  She was started on mesalamine 4 g enema at bedtime and symptoms resolved after a couple of days.  She is tolerating the enema OK.  We reviewed the diagnosis again and suggested follow up. Recommend checking her CMP today given her h/o CKD and in 3-4 weeks. She is already on Enbrel for her arthritis and failed Humira in the  past.  She is transitioning care to a new rheumatologist in February.  Will touch base with them after she sees to see if there are any agents that could work for both her psoriatic arthritis and UC. She will need     Oropharyngeal dysphagia, choking 2/2 Zenker's diverticulum - video swallow in 2021 was unremarkable. ENT eval unremarkable in the past but she was advised to follow up with ENT after her EGD in 2023, which showed a Zenker's diverticulum - she hasn't yet. The esophagram wasn't done either. Recommend follow up with ENT to discuss whether surgical intervention is warranted.      GERD with esophagitis  Non-obstructing Schatzki's ring - noted on EGD 10/2023.  Symptoms are well controlled with pantoprazole 40 mg daily-PRN.     CRC adenoma, CRC surveillance - s/p CLN 12/2024 that showed ulcerative proctosigmoiditis and a small TA in the ascending colon that was removed.  She will need another CLN in 6-12 months.      Recommend    - CMP today - if normal, will plan to recheck in ~3 weeks    - Continue mesalamine enemas at bedtime    - Repeat CLN in 6-12 months    - Referral to ENT to weigh in on her Zenker's diverticulum - already given previously     - Continue pantoprazole 40 mg daily-PRN    - Stay up to date on vaccines, including annual influenza and COVID    - Stay up to date on mammograms, PAP smears,and other age related screening tests    - Will plan to touch base with her new rheumatologist to discuss switching to an agent that works for both RA and UC (Tremfya, Skirizi, Remicade, or Simponi)   - Will order hep B serologies and quant gold          Orders This Visit:  Orders Placed This Encounter   Procedures    Comp Metabolic Panel (14)       Meds This Visit:  Requested Prescriptions     Signed Prescriptions Disp Refills    Mesalamine 4 g Rectal Enema 30 enema 11     Sig: Place 1 enema (4 g total) rectally every evening.       Imaging & Referrals:  None         Tita Jacobsen MD          This note was  partially prepared using Dragon Medical voice recognition dictation software. As a result, errors may occur. When identified, these errors have been corrected. While every attempt is made to correct errors during dictation, discrepancies may still exist.      Minocycline Counseling: Patient advised regarding possible photosensitivity and discoloration of the teeth, skin, lips, tongue and gums.  Patient instructed to avoid sunlight, if possible.  When exposed to sunlight, patients should wear protective clothing, sunglasses, and sunscreen.  The patient was instructed to call the office immediately if the following severe adverse effects occur:  hearing changes, easy bruising/bleeding, severe headache, or vision changes.  The patient verbalized understanding of the proper use and possible adverse effects of minocycline.  All of the patient's questions and concerns were addressed.

## 2025-02-03 DIAGNOSIS — F41.9 ANXIETY: ICD-10-CM

## 2025-02-04 RX ORDER — LORAZEPAM 1 MG/1
1 TABLET ORAL DAILY PRN
Qty: 30 TABLET | Refills: 2 | Status: SHIPPED | OUTPATIENT
Start: 2025-02-04

## 2025-02-14 ENCOUNTER — OFFICE VISIT (OUTPATIENT)
Dept: RHEUMATOLOGY | Facility: CLINIC | Age: 80
End: 2025-02-14
Payer: MEDICARE

## 2025-02-14 VITALS
HEART RATE: 86 BPM | SYSTOLIC BLOOD PRESSURE: 128 MMHG | DIASTOLIC BLOOD PRESSURE: 60 MMHG | HEIGHT: 65 IN | BODY MASS INDEX: 30.62 KG/M2 | RESPIRATION RATE: 18 BRPM | OXYGEN SATURATION: 97 % | WEIGHT: 183.81 LBS

## 2025-02-14 DIAGNOSIS — M05.79 SEROPOSITIVE RHEUMATOID ARTHRITIS OF MULTIPLE SITES (HCC): Primary | ICD-10-CM

## 2025-02-14 DIAGNOSIS — K51.919 ULCERATIVE COLITIS WITH COMPLICATION, UNSPECIFIED LOCATION (HCC): ICD-10-CM

## 2025-02-14 DIAGNOSIS — Z51.81 ENCOUNTER FOR THERAPEUTIC DRUG MONITORING: ICD-10-CM

## 2025-02-14 DIAGNOSIS — M75.31 CALCIFIC TENDONITIS OF RIGHT SHOULDER: ICD-10-CM

## 2025-02-14 PROCEDURE — 99215 OFFICE O/P EST HI 40 MIN: CPT | Performed by: INTERNAL MEDICINE

## 2025-02-14 PROCEDURE — G2211 COMPLEX E/M VISIT ADD ON: HCPCS | Performed by: INTERNAL MEDICINE

## 2025-02-14 RX ORDER — MEDROXYPROGESTERONE ACETATE 150 MG/ML
INJECTION, SUSPENSION INTRAMUSCULAR
Qty: 4 EACH | Refills: 2 | Status: SHIPPED | OUTPATIENT
Start: 2025-02-14

## 2025-02-14 NOTE — PROGRESS NOTES
RHEUMATOLOGY CLINIC- FOLLOW UP    Jaz Thayer is a 79 year old female.    ASSESSMENT/PLAN:       ICD-10-CM    1. Seropositive rheumatoid arthritis of multiple sites (HCC)  M05.79       2. Encounter for therapeutic drug monitoring  Z51.81 Hepatitis Panel, Acute (4)      3. Encounter for other specified special examinations  Z01.89 Hepatitis Panel, Acute (4)      4. Calcific tendonitis of right shoulder  M75.31         DISCUSSION:  Patient with longstanding double seropositive RA well-controlled on weekly Enbrel. Discussed with patient given her overall good control of symptoms, would be reasonable to continue Enbrel.  Of note though, she was recently diagnosed with ulcerative colitis and they are discussing possible immunologic therapy.  Will communicate.    PLAN:  -Continue Enbrel weekly for now pending further discussion with GI  - Prior lab work reviewed.  Will add acute hepatitis panel while on Enbrel  - Consult/evaluation communicated with referring physician/provider.       -GI: Dr. Tita Jacobsen     Patient to f/u 6 months (updated TB testing ordered per GI)    Ryan Villarreal DO  2/14/2025   11:56 AM    There is a longitudinal care relationship with me, the care plan reflects the ongoing nature of the continuous relationship of care, and the medical record indicates that there is ongoing treatment of a serious/complex medical condition which I am currently managing.  is Applicable.       Discussed with patient that they are on chronic treatment with a high-risk medication that requires close lab monitoring for possible drug toxicity.  Additionally, discussed with patient give the possible immunosuppressive effects of their medication as well as their underlying hyper-inflammatory state, the importance of keeping vaccinations and age-appropriate screenings up-to-date, given increased risk of complications and malignancies seen in these patient populations.  Patient to f/u with repeat labs drawn  prior (standing labs ordered).  Last QuantiFERON TB testin/10/2024  Last Hepatitis testin24       SUBJECTIVE:     25 Follow-Up: Patient doing well since her last appointment MANA bills.  Feels that the Enbrel is helpful.  Of note, was recently diagnosed with ulcerative colitis and is following with GI.      Current Medications:  Enbrel qw     Medication History:    Humira 40 mg every other week-ineffective  P.o. prednisone responsive  Methotrexate 10 mg weekly-ineffective    Leflunomide- prescribed but not started     Interval History:     24 Initial Consult:   I had the pleasure of seeing Jaz Thayer on 2024 as a new outpatient consultation for double seropositive rheumatoid arthritis. The patient was originally referred by her PCP.     79 year old female w/ PMH double seropositive rheumatoid arthritis, CKD, HLD, Lumbar DDD who presents to clinic today.Of note, patient formally following with Dr. Okeefe with last appointment 2023 for follow-up.  As Humira was ineffective, she was previously switched back to Enbrel weekly with improved control of her inflammatory arthritis.  Left shoulder CSI performed with Depo-Medrol during that appointment without complication and she was prescribed a prednisone burst.  Leflunomide was added in addition to the Enbrel.    Patient did not start leflunomide at her last appointment, as her symptoms were relatively well-controlled with a few doses of prednisone and after the shoulder CSI.  At this time, her arthralgias are relatively well-controlled and she only had a minor flare about 1 month ago.  When she does have flares, reports polyarthralgias that can affect her shoulders, hands, ankles or legs.  Only has minimal AM stiffness at this time.    HISTORY:  Past Medical History:    Allergic rhinitis    Anesthesia complication    difficulty waking up    Anxiety    Anxiety state    Arthritis    I take medication for RA    Back problem     Cataract    Esophageal reflux    Osteoarthritis    Problems with swallowing    Rheumatoid arthritis (HCC)    Sleep apnea    cpap    TIA (transient ischemic attack)    Visual impairment    reading glasses      Social Hx Reviewed   Family Hx Reviewed     Medications (Active prior to today's visit):  Current Outpatient Medications   Medication Sig Dispense Refill    LORazepam 1 MG Oral Tab Take 1 tablet (1 mg total) by mouth daily as needed for Anxiety. 30 tablet 2    Etanercept (ENBREL SURECLICK) 50 MG/ML Subcutaneous Solution Auto-injector INJECT 1 PEN UNDER THE SKIN EVERY 7 DAYS 4 each 2    Mesalamine 4 g Rectal Enema Place 1 enema (4 g total) rectally every evening. 30 enema 11    pantoprazole 40 MG Oral Tab EC Take 1 tablet (40 mg total) by mouth every morning before breakfast. 90 tablet 2    Mirabegron ER (MYRBETRIQ) 50 MG Oral Tablet 24 Hr Take 1 tablet (50 mg total) by mouth daily. 30 tablet 11    gabapentin 100 MG Oral Cap Take 1 capsule (100 mg total) by mouth 3 (three) times daily. (Patient not taking: Reported on 1/30/2025) 90 capsule 3    Multiple Vitamin (ONE-DAILY MULTI VITAMINS) Oral Tab Take 1 tablet by mouth daily.      fluticasone propionate 50 MCG/ACT Nasal Suspension 2 sprays by Nasal route daily. 3 each 1       Allergies:  No Known Allergies      ROS:   Review of Systems   Constitutional:  Negative for chills and fever.   HENT:  Negative for congestion, hearing loss, mouth sores, nosebleeds and trouble swallowing.    Eyes:  Negative for photophobia, pain, redness and visual disturbance.   Respiratory:  Negative for cough and shortness of breath.    Cardiovascular:  Negative for chest pain, palpitations and leg swelling.   Gastrointestinal:  Negative for abdominal pain, blood in stool, diarrhea and nausea.   Endocrine: Negative for cold intolerance and heat intolerance.   Genitourinary:  Negative for dysuria, frequency and hematuria.   Musculoskeletal:  Negative for arthralgias, back pain, gait  problem, joint swelling, myalgias, neck pain and neck stiffness.   Skin:  Negative for color change and rash.   Neurological:  Negative for dizziness, weakness, numbness and headaches.   Psychiatric/Behavioral:  Negative for confusion and dysphoric mood.         PHYSICAL EXAM:     Constitutional:  Well developed, Well nourished, No acute distress  HENT:  Normocephalic, Atraumatic, Bilateral external ears normal, Oropharynx moist, No oral exudates.  Neck: Normal range of motion, No tenderness, Supple, No stridor.  Eyes:  PERRL, EOMI, Conjunctiva normal, No discharge.  Respiratory:  Normal breath sounds, No respiratory distress, No wheezing.  Cardiovascular:  Normal heart rate, Normal rhythm, No murmurs, No rubs, No gallops.  GI:  Bowel sounds normal, Soft, No tenderness, No masses, No pulsatile masses.  : No CVA tenderness.  Musculoskeletal:  A comprehensive 28 count joint exam was done and was negative for swelling or tenderness except as noted. Inspections for misalignment, asymmetry, crepitation, defects, tenderness, masses, nodules, effusions, range of motion, and stability in the upper and lower extremities bilaterally are all normal unless noted.           Integument:  Warm, Dry, No erythema, No rash.  Lymphatic:  No lymphadenopathy noted.  Neurologic:  Alert & oriented x 3, Normal motor function, Normal sensory function, No focal deficits noted.  Psychiatric:  Affect normal, Judgment normal, Mood normal.    LABS:     Prior lab work reviewed and notable for:    1/30/2025:  CMP with BUN 14, CR 0.99, LFTs nonelevated, no gamma gap    12/13/2024:  CRP less than 0.4 WNL  CMP with BUN 13, CR 1.11 (high), LFTs nonelevated,  CBC with normal WBC, Hg 11.8 microcytic,  WNL    9/23/2024:  ESR 46, CRP less than 0.4  CBC with WBC 5.3, Hg 12.2 WNL,  WNL    7/3/2024:  CR 0.97 WNL  ESR 47, CRP less than 0.4 WNL  CBC with WBC 5.3, Hg 11.6 microcytic,  WNL  AST 19 WNL, ALT 15 WNL    4/18/24:  Acute  hepatitis panel negative        4/10/2024:  ALT and AST WNL, CR 1.1 (stable)  CBC with microcytic anemia Hg 11.9, normal WBC, normal PLT  ESR 39 (high), CRP less than 0.4 WNL    TB testing negative    2014:   (high),  (high)    Imagin/4/23 Shoulder XR:   Impression   CONCLUSION:     Mild glenohumeral and acromioclavicular osteoarthritis without acute fracture or dislocation.     Calcific tendinosis of the supraspinatus tendon.     22 Lumbar/Pelv XR:   Impression   CONCLUSION:  1. L4-L5:  Posterior and interbody fusion.  2. L3-4 and L5-S1:  Moderate to advanced degenerative disc disease.       Impression   CONCLUSION:  1. No acute finding.

## 2025-02-25 ENCOUNTER — OFFICE VISIT (OUTPATIENT)
Dept: OTOLARYNGOLOGY | Facility: CLINIC | Age: 80
End: 2025-02-25
Payer: MEDICARE

## 2025-02-25 DIAGNOSIS — R13.10 DYSPHAGIA, UNSPECIFIED TYPE: Primary | ICD-10-CM

## 2025-02-25 PROCEDURE — 99213 OFFICE O/P EST LOW 20 MIN: CPT | Performed by: OTOLARYNGOLOGY

## 2025-02-26 NOTE — PROGRESS NOTES
Jaz Thayer is a 79 year old female.    Chief Complaint   Patient presents with    Follow - Up     Video Swallow results        HISTORY OF PRESENT ILLNESS  She presents with a history of dysphagia. States that she seems to choke on certain types of food can be water purées and all types of food which makes her cough on occasion. Previous swallow study demonstrated a 5 to 10 mm Zenker's diverticulum as well as esophageal motility issues in the lower esophagus. Seen by Dr. Jacobsen of GI who recommended an upper Zenker's diverticulum to see if any management is indicated at this time. She specifically denies any regurgitation states at no time has she ever brought up food after she swallowed it and states that her biggest concern is essentially just cough. No history of aspiration.     2/25/25 Here to go over her esophagram.  Esophagram demonstrates a 2.5 cm Zenker's diverticulum which is trace uptake of material with swallow.  Diverticulum empties readily.  Patient here to discuss further management options.  She was seen by speech pathology and given instructions on how to swallow more appropriately to limit issues with swallow and she states that she has been doing this with turning her head to the right as the diverticulum is on the left and this seems to help her with swallowing and prevents the sensation of food being caught in her throat.  Social History     Socioeconomic History    Marital status:    Tobacco Use    Smoking status: Never     Passive exposure: Never    Smokeless tobacco: Never   Vaping Use    Vaping status: Never Used   Substance and Sexual Activity    Alcohol use: Yes     Comment: Socially twice a month    Drug use: Never    Sexual activity: Not Currently   Other Topics Concern    Caffeine Concern Yes    Stress Concern No    Weight Concern No    Special Diet No    Exercise No    Seat Belt Yes       Family History   Problem Relation Age of Onset    Arthritis Father     Other  (Other) Father         stroke    Dementia Father     Heart Disorder Mother     Other (Other) Mother         stroke    Stroke Mother     Other (Other) Sister         ALS    Dementia Sister     Stroke Sister        Past Medical History:    Allergic rhinitis    Anesthesia complication    difficulty waking up    Anxiety    Anxiety state    Arthritis    I take medication for RA    Back problem    Cataract    Esophageal reflux    Osteoarthritis    Problems with swallowing    Rheumatoid arthritis (HCC)    Sleep apnea    cpap    TIA (transient ischemic attack)    Visual impairment    reading glasses       Past Surgical History:   Procedure Laterality Date    Appendectomy  2015    Back surgery  2021     L4-5 extreme lateral interbody fusion with metallic cage and cadaver bone graft through the side, plus L4 inferior L3 laminectomy, foraminotomy, L4-5 discectomy and decompression of the spinal nerves with metallic L4-5 trans facet through the back    Cataract  2016    Surgery    Colonoscopy      Colonoscopy N/A 2024    Dr. Connell; diverticulosis, polyp, colitis    Colonoscopy N/A 2024    Procedure: COLONOSCOPY;  Surgeon: Fabiana Connell MD;  Location: [facility] ENDOSCOPY    [facility]  1975    Upper gi endoscopy,exam           REVIEW OF SYSTEMS    System Neg/Pos Details   Constitutional Negative Fatigue, fever and weight loss.   ENMT Negative Drooling.   Eyes Negative Blurred vision and vision changes.   Respiratory Negative Dyspnea and wheezing.   Cardio Negative Chest pain, irregular heartbeat/palpitations and syncope.   GI Negative Abdominal pain and diarrhea.   Endocrine Negative Cold intolerance and heat intolerance.   Neuro Negative Tremors.   Psych Negative Anxiety and depression.   Integumentary Negative Frequent skin infections, pigment change and rash.   Hema/Lymph Negative Easy bleeding and easy bruising.           PHYSICAL EXAM    There were no vitals taken for this  visit.       Constitutional Normal Overall appearance - Normal.   Psychiatric Normal Orientation - Oriented to time, place, person & situation. Appropriate mood and affect.   Neck Exam Normal Inspection - Normal. Palpation - Normal. Parotid gland - Normal. Thyroid gland - Normal.   Eyes Normal Conjunctiva - Right: Normal, Left: Normal. Pupil - Right: Normal, Left: Normal. Fundus - Right: Normal, Left: Normal.   Neurological Normal Memory - Normal. Cranial nerves - Cranial nerves II through XII grossly intact.   Head/Face Normal Facial features - Normal. Eyebrows - Normal. Skull - Normal.        Nasopharynx Normal External nose - Normal. Lips/teeth/gums - Normal. Tonsils - Normal. Oropharynx - Normal.   Ears Normal Inspection - Right: Normal, Left: Normal. Canal - Right: Normal, Left: Normal. TM - Right: Normal, Left: Normal.   Skin Normal Inspection - Normal.        Lymph Detail Normal Submental. Submandibular. Anterior cervical. Posterior cervical. Supraclavicular.        Nose/Mouth/Throat Normal External nose - Normal. Lips/teeth/gums - Normal. Tonsils - Normal. Oropharynx - Normal.   Nose/Mouth/Throat Normal Nares - Right: Normal Left: Normal. Septum -Normal  Turbinates - Right: Normal, Left: Normal.       Current Outpatient Medications:     Etanercept (ENBREL SURECLICK) 50 MG/ML Subcutaneous Solution Auto-injector, INJECT 1 PEN UNDER THE SKIN EVERY 7 DAYS, Disp: 4 each, Rfl: 2    LORazepam 1 MG Oral Tab, Take 1 tablet (1 mg total) by mouth daily as needed for Anxiety., Disp: 30 tablet, Rfl: 2    Mesalamine 4 g Rectal Enema, Place 1 enema (4 g total) rectally every evening., Disp: 30 enema, Rfl: 11    pantoprazole 40 MG Oral Tab EC, Take 1 tablet (40 mg total) by mouth every morning before breakfast., Disp: 90 tablet, Rfl: 2    Mirabegron ER (MYRBETRIQ) 50 MG Oral Tablet 24 Hr, Take 1 tablet (50 mg total) by mouth daily., Disp: 30 tablet, Rfl: 11    Multiple Vitamin (ONE-DAILY MULTI VITAMINS) Oral Tab, Take 1  tablet by mouth daily., Disp: , Rfl:     fluticasone propionate 50 MCG/ACT Nasal Suspension, 2 sprays by Nasal route daily., Disp: 3 each, Rfl: 1    gabapentin 100 MG Oral Cap, Take 1 capsule (100 mg total) by mouth 3 (three) times daily. (Patient not taking: Reported on 1/30/2025), Disp: 90 capsule, Rfl: 3  ASSESSMENT AND PLAN    1. Dysphagia, unspecified type  Small Zenkers diverticulum. Using  therapy exercises. No real issues at this time. Discussed surgical management and at this time she wishes to wait and manage this conservatively RTC if symptoms worsen for discussions regarding surgical management.         This note was prepared using Dragon Medical voice recognition dictation software. As a result errors may occur. When identified these errors have been corrected. While every attempt is made to correct errors during dictation discrepancies may still exist    Bradley Wan MD    2/25/2025    10:21 PM

## 2025-02-27 ENCOUNTER — VIRTUAL PHONE E/M (OUTPATIENT)
Dept: GASTROENTEROLOGY | Facility: CLINIC | Age: 80
End: 2025-02-27

## 2025-02-27 ENCOUNTER — TELEPHONE (OUTPATIENT)
Dept: GASTROENTEROLOGY | Facility: CLINIC | Age: 80
End: 2025-02-27

## 2025-02-27 ENCOUNTER — TELEPHONE (OUTPATIENT)
Dept: RHEUMATOLOGY | Facility: CLINIC | Age: 80
End: 2025-02-27

## 2025-02-27 DIAGNOSIS — K51.30 ULCERATIVE RECTOSIGMOIDITIS WITHOUT COMPLICATION (HCC): ICD-10-CM

## 2025-02-27 DIAGNOSIS — K21.00 GASTROESOPHAGEAL REFLUX DISEASE WITH ESOPHAGITIS WITHOUT HEMORRHAGE: Primary | ICD-10-CM

## 2025-02-27 DIAGNOSIS — N18.9 CHRONIC KIDNEY DISEASE, UNSPECIFIED CKD STAGE: Primary | ICD-10-CM

## 2025-02-27 RX ORDER — SODIUM, POTASSIUM,MAG SULFATES 17.5-3.13G
SOLUTION, RECONSTITUTED, ORAL ORAL
Qty: 177 ML | Refills: 0 | Status: SHIPPED | OUTPATIENT
Start: 2025-02-27

## 2025-02-27 RX ORDER — MESALAMINE 4 G/60ML
1 SUSPENSION RECTAL EVERY EVENING
Qty: 30 ENEMA | Refills: 11 | Status: SHIPPED | OUTPATIENT
Start: 2025-02-27

## 2025-02-27 NOTE — TELEPHONE ENCOUNTER
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     Referral sent?:  N/A    EM or [facility] notified?:  I sent an electronic request to Endo Scheduling and received a confirmation today.      Medication Orders:  This patient verbally confirmed that she is not taking:    Iron, blood thinners, BP meds, and is not diabetic    No latex allergy, No PCN allergy and does not have a pacemaker     Misc Orders:       Further instructions given by staff:   I discussed the prep instructions with the patient which she verbally understood and is aware that I will send the instructions today via [facility].    Advised patient:    You will not be able to drive, operate machinery or make critical decisions the day of your procedure. Please make arrangements for transportation. You must have a  (age 18 or older) to accompany you, stay in the facility for the duration of your procedure and drive you home after the procedure.  You cannot use public transportation (Uber, Lyft, Taxi). The procedure involves sedation, and you will not be allowed to leave unaccompanied. Your procedure will not proceed forward if you're unable to confirm your  planned to escort you home.    Advised Patient:    [facility] requires payment of copay and any patient responsibility at the time of registration.   The EOS requires copay and 50% of the patient responsibility at the time of service for all Esophagogastroduodenoscopy and diagnostic  Colonoscopies.     They do offer payment plans and Care Credit options if unable to pay the full amount at the time of registration.     If you have any questions regarding your potential responsibility, please contact [facility] Insurance Department at [phone] option 1.    You may receive 4 bills related to your medical procedure:   [facility] (the facility)  The procedural physician  The anesthesiologist  The pathology lab (if applicable)

## 2025-02-27 NOTE — TELEPHONE ENCOUNTER
Schedulers, see providers orders below:     -Patient had a Telephone visit today and was provided with written and verbal procedure prep instructions, including any medication adjustments.   -Patient was advised to call medical insurance for any questions on benefits and/or any out of pocket costs.   -Patient is aware that the GI schedulers will be calling to schedule procedure(s).      Instructions for the procedure  1. Schedule colonoscopy with MAC [Diagnosis: ulcerative proctosigmoiditis, assess for healing]     2.  bowel prep from pharmacy (split dose Suprep)     3. Continue all medications for procedure     4. Read all bowel prep instructions carefully     5. AVOID seeds, nuts, popcorn, raw fruits and vegetables (cooked is okay) for 2-3 days before procedure     6. If you start any NEW medication after your visit today, please notify us. Certain medications will need to be held before the procedure, or the procedure cannot be performed.

## 2025-02-27 NOTE — PATIENT INSTRUCTIONS
PLAN    - Come in for the blood work today or in the next week    - Schedule the colonoscopy some time between July 2025-January 2026    - Restart mesalamine enemas every other night    - Dr. Jacobsen is going to talk to Dr. Villarreal about medications for both the psoriasis and colitis    - See a nephrologist when able     - Follow up with Dr. Jacobsen in ~3 months     Instructions for the procedure  1. Schedule colonoscopy with MAC [Diagnosis: ulcerative proctosigmoiditis, assess for healing]    2.  bowel prep from pharmacy (split dose Suprep)    3. Continue all medications for procedure    4. Read all bowel prep instructions carefully    5. AVOID seeds, nuts, popcorn, raw fruits and vegetables (cooked is okay) for 2-3 days before procedure    6. If you start any NEW medication after your visit today, please notify us. Certain medications will need to be held before the procedure, or the procedure cannot be performed.

## 2025-02-27 NOTE — PROGRESS NOTES
Virtual Telephone Check-In    Jaz Thayer verbally consents to a Virtual/Telephone Check-In visit on 02/27/25.  Patient has been referred to the [facility] website at [email or web address] to review the yearly Consent to Treat document.    Patient understands and accepts financial responsibility for any deductible, co-insurance and/or co-pays associated with this service.    Duration of the service: 10 minutes                                                                                              [facility] - Gastroenterology                                                                                                                 No chief complaint on file.      HPI:   Jaz Thayer is a 79 year old year-old female with history of psoriatic arthritis on enbrel here for the following:      Pt ran out of her mesalamine enemas 1 week ago but is feeling OK. Denies any blood in the stool/hematochezia or diarrhea.      Denies f/c, weight loss, abdominal pain, dysequilibrium, dizziness, lightheadedness, or any other symptoms.      Video swallow test 2021 - done for oropharyngeal dysphagia was unremarkable.     Denies family h/o CRC.     Prior endoscopies:  CLN over 10 years ago - negative for polyps.   FOBT negative 2019.      EGD 10/2023  Impression:  1. A 5-10 mm Zenker's diverticulum noted in the proximal esophagus. The patient's choking may be due to the Zenker's diverticulum.    2. Non-obstructing Schatzki's ring s/p disruption in four quadrants with cold forceps biopsies.    3. 3 cm hiatal hernia.   4. Mild gastritis biopsied.      Recommend:  1. Await pathology.  2. Schedule the esophagram when able.   3. Follow up with ENT after the esophagram to further discuss management of the Zenker's diverticulum.   4.  Call [phone] to set up an appointment with one of the allergy doctors for your persistent post nasal drip and congestion.   5. Start pantoprazole 40 mg daily for acid reflux.    6. Follow up with Dr. Jacobsen in after the esophagram is done and after you see ENT and allergy.      Path  Final Diagnosis:      A. Gastric; biopsies:  Mild chronic inactive gastritis.  Diff-Quik stain (appropriate control reactivity) shows no definitive evidence of H. pylori-like microorganisms.  No evidence of dysplasia or carcinoma identified.     B. Esophagus; biopsies:   Fragments of squamous mucosa with features of reflux esophagitis.  Minute strips of associated gastric glandular type mucosa with mild chronic inflammation.  No evidence of intestinal metaplasia, dysplasia or carcinoma identified.     Colonoscopy 12/31/2024  Impression:   Mild-moderate inflammation involving the rectum and sigmoid to 30 cm from the anal verge, likely ulcerative colitis. Biopsied.   Normal terminal ileum. Biopsied.  One sub-centimeter polyp removed.    The colon was otherwise normal with glistening mucosa and intact vascular pattern. Biopsied.     Final Diagnosis:      A. Ileum; biopsy:  Small intestinal mucosa without significant histopathology.  Intact villous architecture identified.     B. Ascending colon polyp (x1):   Tubular adenoma.     C. Right colon; biopsy:  Colonic mucosa without significant histopathology.  No active colitis, granulomas, or changes associated with chronicity identified.     D. Transverse colon; biopsy:  Colonic mucosa without significant histopathology.  No active colitis, granulomas, or changes associated with chronicity identified.     E. Left colon; biopsy:  Colonic mucosa without significant histopathology.  No active colitis, granulomas, or changes associated with chronicity identified.     F.  Colon (region of colitis); biopsy:  Colonic mucosa with mild active colitis, lamina propria lymphoplasmacytosis, and focal mild architectural distortion (see comment).  No granulomas or dysplasia identified.     Comment:  Differential considerations for the findings in specimen F include infection,  diverticulosis, medications, and inflammatory bowel disease.  Clinical and endoscopic correlation is recommended.       Soc:  -denies smoking  -denies heavy Etoh  -no illicit drug use    Wt Readings from Last 6 Encounters:   25 184 lb (83.5 kg)   25 184 lb (83.5 kg)   25 183 lb 12.8 oz (83.4 kg)   25 181 lb (82.1 kg)   24 179 lb (81.2 kg)   24 180 lb (81.6 kg)        History, Medications, Allergies, ROS:      Past Medical History:    Allergic rhinitis    Anesthesia complication    difficulty waking up    Anxiety    Anxiety state    Arthritis    I take medication for RA    Back problem    Cataract    Esophageal reflux    Osteoarthritis    Problems with swallowing    Rheumatoid arthritis (HCC)    Sleep apnea    cpap    TIA (transient ischemic attack)    Visual impairment    reading glasses      Past Surgical History:   Procedure Laterality Date    Appendectomy  2015    Back surgery  2021     L4-5 extreme lateral interbody fusion with metallic cage and cadaver bone graft through the side, plus L4 inferior L3 laminectomy, foraminotomy, L4-5 discectomy and decompression of the spinal nerves with metallic L4-5 trans facet through the back    Cataract  2016    Surgery    Colonoscopy      Colonoscopy N/A 2024    Dr. Connell; diverticulosis, polyp, colitis    Colonoscopy N/A 2024    Procedure: COLONOSCOPY;  Surgeon: Fabiana Connell MD;  Location: [facility] ENDOSCOPY    [facility]  1975    Upper gi endoscopy,exam        Family Hx:   Family History   Problem Relation Age of Onset    Arthritis Father     Other (Other) Father         stroke    Dementia Father     Heart Disorder Mother     Other (Other) Mother         stroke    Stroke Mother     Other (Other) Sister         ALS    Dementia Sister     Stroke Sister       Social History:   Social History     Socioeconomic History    Marital status:    Tobacco Use    Smoking status: Never      Passive exposure: Never    Smokeless tobacco: Never   Vaping Use    Vaping status: Never Used   Substance and Sexual Activity    Alcohol use: Yes     Comment: Socially twice a month    Drug use: Never    Sexual activity: Not Currently   Other Topics Concern    Caffeine Concern Yes    Stress Concern No    Weight Concern No    Special Diet No    Exercise No    Seat Belt Yes   Social History Narrative    The patient uses the following assistive device(s):  single-point cane.  temporary    The patient does live in a home with stairs.        Medications (Active prior to today's visit):  Current Outpatient Medications   Medication Sig Dispense Refill    Mesalamine 4 g Rectal Enema Place 1 enema (4 g total) rectally every evening. 30 enema 11    Na Sulfate-K Sulfate-Mg Sulf (SUPREP BOWEL PREP KIT) 17.5-3.13-1.6 GM/177ML Oral Solution Take as directed 177 mL 0    Mirabegron ER (MYRBETRIQ) 50 MG Oral Tablet 24 Hr Take 1 tablet (50 mg total) by mouth daily. 30 tablet 11    Etanercept (ENBREL SURECLICK) 50 MG/ML Subcutaneous Solution Auto-injector INJECT 1 PEN UNDER THE SKIN EVERY 7 DAYS 4 each 2    LORazepam 1 MG Oral Tab Take 1 tablet (1 mg total) by mouth daily as needed for Anxiety. 30 tablet 2    pantoprazole 40 MG Oral Tab EC Take 1 tablet (40 mg total) by mouth every morning before breakfast. 90 tablet 2    Multiple Vitamin (ONE-DAILY MULTI VITAMINS) Oral Tab Take 1 tablet by mouth daily.      fluticasone propionate 50 MCG/ACT Nasal Suspension 2 sprays by Nasal route daily. (Patient not taking: Reported on 3/25/2025) 3 each 1       Allergies:  No Known Allergies    ROS:   CONSTITUTIONAL:  negative for fevers, rigors  EYES:  negative for diplopia   RESPIRATORY:  negative for severe shortness of breath  CARDIOVASCULAR:  negative for crushing sub-sternal chest pain  GASTROINTESTINAL:  see HPI  GENITOURINARY:  negative for dysuria or gross hematuria  INTEGUMENT/BREAST:  SKIN:  negative for jaundice   ALLERGIC/IMMUNOLOGIC:   negative for hay fever  ENDOCRINE:  negative for cold intolerance and heat intolerance  MUSCULOSKELETAL:  negative for joint effusion/severe erythema  BEHAVIOR/PSYCH:  negative for psychotic behavior      PHYSICAL EXAM:   There were no vitals taken for this visit.    Phone visit.     Labs/Imaging:     Patient's pertinent labs and imaging were reviewed and discussed with patient today.      .  ASSESSMENT/PLAN:   79 F with h/o psoriatic arthritis on enbrel here for the following:    Ulcerative proctosigmoiditis   H/o CKD on mesalamine enemas  H/o psoriatic arthritis  HPI:  pt developed hematochezia 9/2024, calprotectin was > 800 with normal CRP, and CLN 12/2024 showed mild ulcerative pancolitis up to 30 cm from the anal verge.  She was started on mesalamine 4 g enema at bedtime and symptoms resolved after a couple of days.  She was tolerating the enema OK.  We reviewed the diagnosis again and suggested follow up. Recommend rechecking her CMP given her h/o CKD. She is already on Enbrel for her arthritis and failed Humira in the past.  Will discuss her case with rheumatology to see if there are any agents that could work for both her psoriatic arthritis and UC.     Oropharyngeal dysphagia, choking 2/2 Zenker's diverticulum - video swallow in 2021 was unremarkable. ENT eval was unremarkable in the past but she was advised to follow up with ENT after her EGD in 2023, which showed a Zenker's diverticulum - she just did yesterday and her options were discussed.  She was advised to RTC if symptoms worsen.     GERD with esophagitis  Non-obstructing Schatzki's ring - noted on EGD 10/2023.  Symptoms are well controlled with pantoprazole 40 mg daily-PRN.     CRC adenoma, CRC surveillance - s/p CLN 12/2024 that showed ulcerative proctosigmoiditis and a small TA in the ascending colon that was removed.  She will need another CLN in 6-12 months.      Recommend    - CMP in the next week      - Restart mesalamine enemas every other  night   - Will plan to wean pending discussion with rheumatology    - Repeat CLN in 6-12 months    - CLD the day prior, NPO after midnight, split dose Suprep     - Continue pantoprazole 40 mg daily-PRN    - Stay up to date on vaccines, including annual influenza and COVID    - Stay up to date on mammograms, PAP smears,and other age related screening tests   - Will order hep B serologies and quant gold      Colonoscopy consent: I have discussed the risks, benefits, and alternatives to colonoscopy with the patient/primary decision maker [who demonstrated understanding], including but not limited to the risks of bleeding, infection, pain, death, as well as the risks of anesthesia and perforation all leading to prolonged hospitalization, surgical intervention, or even death. I also specifically mentioned the miss rate of colonoscopy of 5-10% in the best of all circumstances. The patient has agreed to sign an informed consent and elected to proceed with colonoscopy with possible intervention [i.e. polypectomy, stent placement, etc.] as indicated.        Orders This Visit:  No orders of the defined types were placed in this encounter.      Meds This Visit:  Requested Prescriptions     Signed Prescriptions Disp Refills    Mesalamine 4 g Rectal Enema 30 enema 11     Sig: Place 1 enema (4 g total) rectally every evening.    Na Sulfate-K Sulfate-Mg Sulf (SUPREP BOWEL PREP KIT) 17.5-3.13-1.6 GM/177ML Oral Solution 177 mL 0     Sig: Take as directed       Imaging & Referrals:  NEPHROLOGY - INTERNAL         Tita Jacobsen MD          This note was partially prepared using Dragon [employer] voice recognition dictation software. As a result, errors may occur. When identified, these errors have been corrected. While every attempt is made to correct errors during dictation, discrepancies may still exist.

## 2025-02-28 NOTE — TELEPHONE ENCOUNTER
Hello-this patient's GI physician recently reached out regarding her IBD diagnosis and possible treatment changes. Can someone please reach out to them to help them schedule a sooner follow-up with me? Thank you.

## 2025-03-03 ENCOUNTER — TELEPHONE (OUTPATIENT)
Facility: CLINIC | Age: 80
End: 2025-03-03

## 2025-03-03 NOTE — TELEPHONE ENCOUNTER
1st,Overdue reminder letter sent out via [employer] for the following:    Labs Order:  Orders Placed 1/30/2025    Comp Metabolic Panel (14) 1/2 remain  Hep B Core AB, Tot  Hepatitis B Surface Antigen  Hepatitis B Surface Antibody  Quantiferon TB Gold (in Tube)

## 2025-03-04 ENCOUNTER — LAB ENCOUNTER (OUTPATIENT)
Dept: LAB | Facility: HOSPITAL | Age: 80
End: 2025-03-04
Attending: INTERNAL MEDICINE
Payer: MEDICARE

## 2025-03-04 DIAGNOSIS — M05.79 SEROPOSITIVE RHEUMATOID ARTHRITIS OF MULTIPLE SITES (HCC): ICD-10-CM

## 2025-03-04 DIAGNOSIS — Z51.81 THERAPEUTIC DRUG MONITORING: ICD-10-CM

## 2025-03-04 DIAGNOSIS — Z79.899 ENCOUNTER FOR LONG-TERM (CURRENT) USE OF HIGH-RISK MEDICATION: ICD-10-CM

## 2025-03-04 DIAGNOSIS — K51.30 ULCERATIVE RECTOSIGMOIDITIS WITHOUT COMPLICATION (HCC): ICD-10-CM

## 2025-03-04 DIAGNOSIS — Z51.81 ENCOUNTER FOR THERAPEUTIC DRUG MONITORING: ICD-10-CM

## 2025-03-04 LAB
ALBUMIN SERPL-MCNC: 4.3 G/DL (ref 3.2–4.8)
ALBUMIN/GLOB SERPL: 1.3 {RATIO} (ref 1–2)
ALP LIVER SERPL-CCNC: 82 U/L
ALT SERPL-CCNC: 14 U/L
ANION GAP SERPL CALC-SCNC: 9 MMOL/L (ref 0–18)
AST SERPL-CCNC: 17 U/L (ref ?–34)
BASOPHILS # BLD AUTO: 0.03 X10(3) UL (ref 0–0.2)
BASOPHILS NFR BLD AUTO: 0.6 %
BILIRUB SERPL-MCNC: 0.4 MG/DL (ref 0.2–1.1)
BUN BLD-MCNC: 13 MG/DL (ref 9–23)
BUN/CREAT SERPL: 12 (ref 10–20)
CALCIUM BLD-MCNC: 9.4 MG/DL (ref 8.7–10.4)
CHLORIDE SERPL-SCNC: 105 MMOL/L (ref 98–112)
CO2 SERPL-SCNC: 25 MMOL/L (ref 21–32)
CREAT BLD-MCNC: 1.08 MG/DL
CRP SERPL-MCNC: <0.4 MG/DL (ref ?–1)
DEPRECATED RDW RBC AUTO: 39.1 FL (ref 35.1–46.3)
EGFRCR SERPLBLD CKD-EPI 2021: 53 ML/MIN/1.73M2 (ref 60–?)
EOSINOPHIL # BLD AUTO: 0.09 X10(3) UL (ref 0–0.7)
EOSINOPHIL NFR BLD AUTO: 1.7 %
ERYTHROCYTE [DISTWIDTH] IN BLOOD BY AUTOMATED COUNT: 14.6 % (ref 11–15)
ERYTHROCYTE [SEDIMENTATION RATE] IN BLOOD: 58 MM/HR
FASTING STATUS PATIENT QL REPORTED: YES
GLOBULIN PLAS-MCNC: 3.2 G/DL (ref 2–3.5)
GLUCOSE BLD-MCNC: 90 MG/DL (ref 70–99)
HBV CORE AB SERPL QL IA: NONREACTIVE
HBV SURFACE AB SER QL: NONREACTIVE
HBV SURFACE AB SERPL IA-ACNC: <3.1 MIU/ML
HBV SURFACE AG SER-ACNC: <0.1 [IU]/L
HBV SURFACE AG SERPL QL IA: NONREACTIVE
HCT VFR BLD AUTO: 37 %
HGB BLD-MCNC: 11.7 G/DL
IMM GRANULOCYTES # BLD AUTO: 0.01 X10(3) UL (ref 0–1)
IMM GRANULOCYTES NFR BLD: 0.2 %
LYMPHOCYTES # BLD AUTO: 1.93 X10(3) UL (ref 1–4)
LYMPHOCYTES NFR BLD AUTO: 37.3 %
MCH RBC QN AUTO: 23.5 PG (ref 26–34)
MCHC RBC AUTO-ENTMCNC: 31.6 G/DL (ref 31–37)
MCV RBC AUTO: 74.4 FL
MONOCYTES # BLD AUTO: 0.49 X10(3) UL (ref 0.1–1)
MONOCYTES NFR BLD AUTO: 9.5 %
NEUTROPHILS # BLD AUTO: 2.63 X10 (3) UL (ref 1.5–7.7)
NEUTROPHILS # BLD AUTO: 2.63 X10(3) UL (ref 1.5–7.7)
NEUTROPHILS NFR BLD AUTO: 50.7 %
OSMOLALITY SERPL CALC.SUM OF ELEC: 288 MOSM/KG (ref 275–295)
PLATELET # BLD AUTO: 286 10(3)UL (ref 150–450)
POTASSIUM SERPL-SCNC: 4.4 MMOL/L (ref 3.5–5.1)
PROT SERPL-MCNC: 7.5 G/DL (ref 5.7–8.2)
RBC # BLD AUTO: 4.97 X10(6)UL
SODIUM SERPL-SCNC: 139 MMOL/L (ref 136–145)
WBC # BLD AUTO: 5.2 X10(3) UL (ref 4–11)

## 2025-03-04 PROCEDURE — 85652 RBC SED RATE AUTOMATED: CPT

## 2025-03-04 PROCEDURE — 86706 HEP B SURFACE ANTIBODY: CPT

## 2025-03-04 PROCEDURE — 86480 TB TEST CELL IMMUN MEASURE: CPT

## 2025-03-04 PROCEDURE — 86704 HEP B CORE ANTIBODY TOTAL: CPT

## 2025-03-04 PROCEDURE — 36415 COLL VENOUS BLD VENIPUNCTURE: CPT

## 2025-03-04 PROCEDURE — 80053 COMPREHEN METABOLIC PANEL: CPT

## 2025-03-04 PROCEDURE — 86140 C-REACTIVE PROTEIN: CPT

## 2025-03-04 PROCEDURE — 87340 HEPATITIS B SURFACE AG IA: CPT

## 2025-03-04 PROCEDURE — 85025 COMPLETE CBC W/AUTO DIFF WBC: CPT

## 2025-03-06 LAB
M TB IFN-G CD4+ T-CELLS BLD-ACNC: 0.05 IU/ML
M TB TUBERC IFN-G BLD QL: NEGATIVE
M TB TUBERC IGNF/MITOGEN IGNF CONTROL: >10 IU/ML
QFT TB1 AG MINUS NIL: -0.01 IU/ML
QFT TB2 AG MINUS NIL: -0.01 IU/ML

## 2025-03-07 ENCOUNTER — TELEPHONE (OUTPATIENT)
Dept: RHEUMATOLOGY | Facility: CLINIC | Age: 80
End: 2025-03-07

## 2025-03-07 NOTE — TELEPHONE ENCOUNTER
Patient states she received a call from Dr. Villarreal office. Does not know why. Was returning a call

## 2025-03-08 NOTE — TELEPHONE ENCOUNTER
Called and spoke with patient, name and  was verified. Inform patient per Ryan Craft DO       25  7:01 PM  Note     Hello-this patient's GI physician recently reached out regarding her IBD diagnosis and possible treatment changes. Can someone please reach out to them to help them schedule a sooner follow-up with me? Thank you.       Patient verbalized understanding and was scheduled.      Future Appointments   Date Time Provider Department Center   3/19/2025 11:40 AM Ryan Villarreal DO [facility]   3/25/2025  2:00 PM Radha Koch MD [facility]   2025  1:00 PM [facility]   2025  2:00 PM Ryan Villarreal DO [facility]   9/3/2025  7:30 AM CHERRIE, PROCEDURE [facility] None

## 2025-03-19 ENCOUNTER — OFFICE VISIT (OUTPATIENT)
Age: 80
End: 2025-03-19
Payer: MEDICARE

## 2025-03-19 ENCOUNTER — TELEPHONE (OUTPATIENT)
Age: 80
End: 2025-03-19

## 2025-03-19 VITALS
BODY MASS INDEX: 30.66 KG/M2 | WEIGHT: 184 LBS | SYSTOLIC BLOOD PRESSURE: 150 MMHG | HEIGHT: 65 IN | DIASTOLIC BLOOD PRESSURE: 80 MMHG | HEART RATE: 67 BPM

## 2025-03-19 DIAGNOSIS — K51.919 ULCERATIVE COLITIS WITH COMPLICATION, UNSPECIFIED LOCATION (HCC): ICD-10-CM

## 2025-03-19 DIAGNOSIS — M05.79 SEROPOSITIVE RHEUMATOID ARTHRITIS OF MULTIPLE SITES (HCC): Primary | ICD-10-CM

## 2025-03-19 DIAGNOSIS — Z79.899 ENCOUNTER FOR LONG-TERM (CURRENT) USE OF HIGH-RISK MEDICATION: ICD-10-CM

## 2025-03-19 DIAGNOSIS — Z51.81 ENCOUNTER FOR THERAPEUTIC DRUG MONITORING: ICD-10-CM

## 2025-03-19 DIAGNOSIS — M06.4 INFLAMMATORY POLYARTHRITIS (HCC): ICD-10-CM

## 2025-03-19 DIAGNOSIS — L40.50 PSORIATIC ARTHRITIS (HCC): ICD-10-CM

## 2025-03-19 PROCEDURE — G2211 COMPLEX E/M VISIT ADD ON: HCPCS | Performed by: INTERNAL MEDICINE

## 2025-03-19 PROCEDURE — 99215 OFFICE O/P EST HI 40 MIN: CPT | Performed by: INTERNAL MEDICINE

## 2025-03-19 NOTE — PROGRESS NOTES
RHEUMATOLOGY CLINIC- FOLLOW UP    Jaz Thayer is a 79 year old female.    ASSESSMENT/PLAN:       ICD-10-CM    1. Seropositive rheumatoid arthritis of multiple sites (HCC): +RF, +CCP  M05.79 ALT(SGPT)     AST (SGOT)     CBC With Differential With Platelet     Creatinine, Serum     C-Reactive Protein     Sed Rate, Westergren (Automated)      2. Psoriatic arthritis (HCC)  L40.50 ALT(SGPT)     AST (SGOT)     CBC With Differential With Platelet     Creatinine, Serum     C-Reactive Protein     Sed Rate, Westergren (Automated)      3. Encounter for therapeutic drug monitoring  Z51.81 ALT(SGPT)     AST (SGOT)     CBC With Differential With Platelet     Creatinine, Serum     C-Reactive Protein     Sed Rate, Westergren (Automated)      4. Ulcerative colitis with complication, unspecified location ([facility])  K51.919 ALT(SGPT)     AST (SGOT)     CBC With Differential With Platelet     Creatinine, Serum     C-Reactive Protein     Sed Rate, Westergren (Automated)      5. Inflammatory polyarthritis ([facility])  M06.4 ALT(SGPT)     AST (SGOT)     CBC With Differential With Platelet     Creatinine, Serum     C-Reactive Protein     Sed Rate, Westergren (Automated)      6. Encounter for long-term (current) use of high-risk medication  Z79.899 ALT(SGPT)     AST (SGOT)     CBC With Differential With Platelet     Creatinine, Serum     C-Reactive Protein     Sed Rate, Westergren (Automated)          DISCUSSION:  Patient with longstanding double seropositive RA/psoriatic arthritis currently on weekly Enbrel.  Patient recently diagnosed with ulcerative colitis in the interim with mild colitis noted on recent workup.  Therefore, discussed with GI modification of immunologic.  I discussed for/benefits of Skyrizi with the patient today to which she is agreeable.  Will look into a PA    PLAN:  -PA for Skyrizi with loading dose (150 mg at weeks 0, 4) then every 3 months.  Discussed with patient if approved, with subsequent one-to-one with  Enbrel.      -Risk/benefits include but are not limited to: Increased risk of infection, rash, GI upset, blood abnormalities, liver abnormalities, allergic reactions including injection site reactions, fatigue, headaches   -Continue Enbrel weekly for now pending PIERCE Vera  - Prior lab work reviewed.   - Consult/evaluation communicated with referring physician/provider.       -GI: Dr. Tita Jacobsen     I will communicate with patient pending PIERCE Vera.  Otherwise, patient to follow-up in 6 months.    Ryan Villarreal DO  3/19/2025   12:52 PM        There is a longitudinal care relationship with me, the care plan reflects the ongoing nature of the continuous relationship of care, and the medical record indicates that there is ongoing treatment of a serious/complex medical condition which I am currently managing.  is Applicable.       Discussed with patient that they are on chronic treatment with a high-risk medication that requires close lab monitoring for possible drug toxicity.  Additionally, discussed with patient give the possible immunosuppressive effects of their medication as well as their underlying hyper-inflammatory state, the importance of keeping vaccinations and age-appropriate screenings up-to-date, given increased risk of complications and malignancies seen in these patient populations.  Patient to f/u with repeat labs drawn prior (standing labs ordered).  Last QuantiFERON TB testing: 3/4/25  Last Hepatitis testing: 3/4/25      SUBJECTIVE:     3/19/25 Follow-Up: Patient recently seen by GI with recent workup notable for mild colitis.  Does have some persistent arthralgias but can be tolerable.    Current Medications:  Enbrel qw     Medication History:    Humira 40 mg every other week-ineffective  P.o. prednisone responsive  Methotrexate 10 mg weekly-ineffective    Leflunomide- prescribed but not started     Interval History:     4/18/24 Initial Consult:   I had the pleasure of seeing  Jaz Thayer on 4/18/2024 as a new outpatient consultation for double seropositive rheumatoid arthritis. The patient was originally referred by her PCP.     79 year old female w/ PMH double seropositive rheumatoid arthritis, CKD, HLD, Lumbar DDD who presents to clinic today.Of note, patient formally following with Dr. Okeefe with last appointment 6/16/2023 for follow-up.  As Humira was ineffective, she was previously switched back to Enbrel weekly with improved control of her inflammatory arthritis.  Left shoulder CSI performed with Depo-Medrol during that appointment without complication and she was prescribed a prednisone burst.  Leflunomide was added in addition to the Enbrel.    Patient did not start leflunomide at her last appointment, as her symptoms were relatively well-controlled with a few doses of prednisone and after the shoulder CSI.  At this time, her arthralgias are relatively well-controlled and she only had a minor flare about 1 month ago.  When she does have flares, reports polyarthralgias that can affect her shoulders, hands, ankles or legs.  Only has minimal AM stiffness at this time.    2/14/25 Follow-Up: Patient doing well since her last appointment MA[facility] bills.  Feels that the Enbrel is helpful.  Of note, was recently diagnosed with ulcerative colitis and is following with GI.    HISTORY:  Past Medical History:    Allergic rhinitis    Anesthesia complication    difficulty waking up    Anxiety    Anxiety state    Arthritis    I take medication for RA    Back problem    Cataract    Esophageal reflux    Osteoarthritis    Problems with swallowing    Rheumatoid arthritis (HCC)    Sleep apnea    cpap    TIA (transient ischemic attack)    Visual impairment    reading glasses      Social Hx Reviewed   Family Hx Reviewed     Medications (Active prior to today's visit):  Current Outpatient Medications   Medication Sig Dispense Refill    Mesalamine 4 g Rectal Enema Place 1 enema (4 g total)  rectally every evening. 30 enema 11    Na Sulfate-K Sulfate-Mg Sulf (SUPREP BOWEL PREP KIT) 17.5-3.13-1.6 GM/177ML Oral Solution Take as directed 177 mL 0    Etanercept (ENBREL SURECLICK) 50 MG/ML Subcutaneous Solution Auto-injector INJECT 1 PEN UNDER THE SKIN EVERY 7 DAYS 4 each 2    LORazepam 1 MG Oral Tab Take 1 tablet (1 mg total) by mouth daily as needed for Anxiety. 30 tablet 2    pantoprazole 40 MG Oral Tab EC Take 1 tablet (40 mg total) by mouth every morning before breakfast. 90 tablet 2    Mirabegron ER (MYRBETRIQ) 50 MG Oral Tablet 24 Hr Take 1 tablet (50 mg total) by mouth daily. 30 tablet 11    gabapentin 100 MG Oral Cap Take 1 capsule (100 mg total) by mouth 3 (three) times daily. (Patient not taking: Reported on 3/19/2025) 90 capsule 3    Multiple Vitamin (ONE-DAILY MULTI VITAMINS) Oral Tab Take 1 tablet by mouth daily.      fluticasone propionate 50 MCG/ACT Nasal Suspension 2 sprays by Nasal route daily. 3 each 1       Allergies:  No Known Allergies      ROS:   Review of Systems   Constitutional:  Negative for chills and fever.   HENT:  Negative for congestion, hearing loss, mouth sores, nosebleeds and trouble swallowing.    Eyes:  Negative for photophobia, pain, redness and visual disturbance.   Respiratory:  Negative for cough and shortness of breath.    Cardiovascular:  Negative for chest pain, palpitations and leg swelling.   Gastrointestinal:  Negative for abdominal pain, blood in stool, diarrhea and nausea.   Endocrine: Negative for cold intolerance and heat intolerance.   Genitourinary:  Negative for dysuria, frequency and hematuria.   Musculoskeletal:  Negative for arthralgias, back pain, gait problem, joint swelling, myalgias, neck pain and neck stiffness.   Skin:  Negative for color change and rash.   Neurological:  Negative for dizziness, weakness, numbness and headaches.   Psychiatric/Behavioral:  Negative for confusion and dysphoric mood.         PHYSICAL EXAM:     Constitutional:  Well  developed, Well nourished, No acute distress  HENT:  Normocephalic, Atraumatic, Bilateral external ears normal, Oropharynx moist, No oral exudates.  Neck: Normal range of motion, No tenderness, Supple, No stridor.  Eyes:  PERRL, EOMI, Conjunctiva normal, No discharge.  Respiratory:  Normal breath sounds, No respiratory distress, No wheezing.  Cardiovascular:  Normal heart rate, Normal rhythm, No murmurs, No rubs, No gallops.  GI:  Bowel sounds normal, Soft, No tenderness, No masses, No pulsatile masses.  : No CVA tenderness.  Musculoskeletal:  A comprehensive 28 count joint exam was done and was negative for swelling or tenderness except as noted. Inspections for misalignment, asymmetry, crepitation, defects, tenderness, masses, nodules, effusions, range of motion, and stability in the upper and lower extremities bilaterally are all normal unless noted.           Integument:  Warm, Dry, No erythema, No rash.  Lymphatic:  No lymphadenopathy noted.  Neurologic:  Alert & oriented x 3, Normal motor function, Normal sensory function, No focal deficits noted.  Psychiatric:  Affect normal, Judgment normal, Mood normal.    LABS:     Prior lab work reviewed and notable for:    3/4/2025:  Hepatitis B surface antigen nonreactive, hepatitis B core antibody nonreactive, hepatitis B surface antibody nonreactive  QuantiFERON-TB testing negative    CMP with BUN 13, CR 1.08 borderline elevated  ESR 58 (high), CRP less than 0.4 WNL  CBC with normal WBC, Hg 11.7 microcytic,  WNL    1/30/2025:  CMP with BUN 14, CR 0.99, LFTs nonelevated, no gamma gap    12/13/2024:  CRP less than 0.4 WNL  CMP with BUN 13, CR 1.11 (high), LFTs nonelevated,  CBC with normal WBC, Hg 11.8 microcytic,  WNL    9/23/2024:  ESR 46, CRP less than 0.4  CBC with WBC 5.3, Hg 12.2 WNL,  WNL    7/3/2024:  CR 0.97 WNL  ESR 47, CRP less than 0.4 WNL  CBC with WBC 5.3, Hg 11.6 microcytic,  WNL  AST 19 WNL, ALT 15 WNL    4/18/24:  Acute  hepatitis panel negative        4/10/2024:  ALT and AST WNL, CR 1.1 (stable)  CBC with microcytic anemia Hg 11.9, normal WBC, normal PLT  ESR 39 (high), CRP less than 0.4 WNL    TB testing negative    2014:   (high),  (high)    Imagin/4/23 Shoulder XR:   Impression   CONCLUSION:     Mild glenohumeral and acromioclavicular osteoarthritis without acute fracture or dislocation.     Calcific tendinosis of the supraspinatus tendon.     22 Lumbar/Pelv XR:   Impression   CONCLUSION:  1. L4-L5:  Posterior and interbody fusion.  2. L3-4 and L5-S1:  Moderate to advanced degenerative disc disease.       Impression   CONCLUSION:  1. No acute finding.

## 2025-03-19 NOTE — TELEPHONE ENCOUNTER
Hello-this patient has RA/psoriatic arthritis complicated by ulcerative colitis.  Can we please look into PA perry Vera?  Infection screening updated, as below. Thank you.     Chuy. SUBQ: Prefilled syringe and auto-injector: 150 mg at weeks 0, 4, and then every 12 weeks thereafter;    Last QuantiFERON TB testing: 3/4/25  Last Hepatitis testing: 3/4/25      Current Medications:  Enbrel qw     Medication History:    Humira 40 mg every other week-ineffective  P.o. prednisone responsive  Methotrexate 10 mg weekly-ineffective    Leflunomide- prescribed but not started

## 2025-03-24 NOTE — PROGRESS NOTES
Jaz Thayer  : 1945  Date: 3/25/25    Chief Complaint   Patient presents with    Urge Incontinence       HPI:  79 year old female who is status post hysteroscopy with D&C on 12/15/23. Surgery was indicated secondary to thickened endometrium and cervical stenosis.  Was found to have endometrial polyps. Pathology was benign. She was last seen on 24. She has a baseline history of UUI (reason for initial consult). Was prescribed Gemtesa 75 mg PO  which helped initially but then stopped working.  Switched to Myrbetriq 50 mg with good results. Also referred to pelvic floor PT.  Not on Estrace. Referred to PT. Returns for follow up.     Urodynamic testing on 23, which showed 250 ml capacity, +DO with no leaking at 81 ml and RADHA at 250 mL.     Interval history:  Doing well.  Continues to take Myrbetriq 50 mg PO daily. No side effects.  No problems with coverage.  Voids every 2 hours during the day and x 2 at night (stable)  No RADHA or UUI.   No interval UTIs.   Bowels are regular.   + . Not sexually active at this time.       Objective:  /70   Resp 16   Ht 65\"   Wt 184 lb (83.5 kg)   BMI 30.62 kg/m²     General:  Alert and oriented. Well-nourished, normally developed.  Thought and emotional status are appropriate, speech is understandable.  No acute distress.  Pelvic: deferred.     Impression:  Encounter Diagnosis   Name Primary?    Urge incontinence      Plan:  Symptoms stable. Continue Myrbetriq 50 mg PO daily. Refill sent.  Follow up in 1 year or sooner prn.     Radha Koch MD, FACOG, FACS  Female Pelvic Medicine and  Reconstructive Surgery (Urogynecology)

## 2025-03-25 ENCOUNTER — OFFICE VISIT (OUTPATIENT)
Dept: UROLOGY | Facility: HOSPITAL | Age: 80
End: 2025-03-25
Attending: OBSTETRICS & GYNECOLOGY
Payer: MEDICARE

## 2025-03-25 VITALS
HEIGHT: 65 IN | BODY MASS INDEX: 30.66 KG/M2 | WEIGHT: 184 LBS | DIASTOLIC BLOOD PRESSURE: 70 MMHG | RESPIRATION RATE: 16 BRPM | SYSTOLIC BLOOD PRESSURE: 126 MMHG

## 2025-03-25 DIAGNOSIS — N39.41 URGE INCONTINENCE: ICD-10-CM

## 2025-03-25 PROCEDURE — 99212 OFFICE O/P EST SF 10 MIN: CPT

## 2025-03-25 RX ORDER — MIRABEGRON 50 MG/1
50 TABLET, FILM COATED, EXTENDED RELEASE ORAL DAILY
Qty: 30 TABLET | Refills: 11 | Status: SHIPPED | OUTPATIENT
Start: 2025-03-25 | End: 2026-03-20

## 2025-03-31 ENCOUNTER — TELEPHONE (OUTPATIENT)
Dept: GASTROENTEROLOGY | Facility: CLINIC | Age: 80
End: 2025-03-31

## 2025-03-31 DIAGNOSIS — N18.9 CHRONIC KIDNEY DISEASE, UNSPECIFIED CKD STAGE: Primary | ICD-10-CM

## 2025-04-01 ENCOUNTER — LAB ENCOUNTER (OUTPATIENT)
Dept: LAB | Facility: HOSPITAL | Age: 80
End: 2025-04-01
Attending: INTERNAL MEDICINE
Payer: MEDICARE

## 2025-04-01 DIAGNOSIS — N18.9 CHRONIC KIDNEY DISEASE, UNSPECIFIED CKD STAGE: ICD-10-CM

## 2025-04-01 LAB
ANION GAP SERPL CALC-SCNC: 9 MMOL/L (ref 0–18)
BUN BLD-MCNC: 15 MG/DL (ref 9–23)
BUN/CREAT SERPL: 13.3 (ref 10–20)
CALCIUM BLD-MCNC: 9.4 MG/DL (ref 8.7–10.4)
CHLORIDE SERPL-SCNC: 105 MMOL/L (ref 98–112)
CO2 SERPL-SCNC: 27 MMOL/L (ref 21–32)
CREAT BLD-MCNC: 1.13 MG/DL
EGFRCR SERPLBLD CKD-EPI 2021: 50 ML/MIN/1.73M2 (ref 60–?)
FASTING STATUS PATIENT QL REPORTED: NO
GLUCOSE BLD-MCNC: 92 MG/DL (ref 70–99)
OSMOLALITY SERPL CALC.SUM OF ELEC: 292 MOSM/KG (ref 275–295)
POTASSIUM SERPL-SCNC: 4.3 MMOL/L (ref 3.5–5.1)
SODIUM SERPL-SCNC: 141 MMOL/L (ref 136–145)

## 2025-04-01 PROCEDURE — 36415 COLL VENOUS BLD VENIPUNCTURE: CPT

## 2025-04-01 PROCEDURE — 80048 BASIC METABOLIC PNL TOTAL CA: CPT

## 2025-04-01 NOTE — TELEPHONE ENCOUNTER
I spoke to the patient    I reviewed the below note from Dr. Jacobsen with the patient, patient verbalized understanding    Patient states she will have blood work done this week    Patient has no further questions at this time

## 2025-04-01 NOTE — TELEPHONE ENCOUNTER
Received 6 different phone numbers with a total of 7 different transfers/calls. With no success. Found phone number online and having form faxed over.

## 2025-04-01 NOTE — TELEPHONE ENCOUNTER
Attempted to complete PA on CoverMyMeds and came back with note that \"This request cannot be processed due to the member's coverage has terminated. Resubmit using active member ID information.\"    Key: [id number]    Spoke with patient verified information from her insurance card and we had the same information. Patient to call her insurance and find out what is going on.

## 2025-04-01 NOTE — TELEPHONE ENCOUNTER
GI staff - please let the patient know that I'd like her to have some blood work done this week to recheck her kidney numbers.  The order is in.  Thanks, SS

## 2025-04-02 ENCOUNTER — TELEPHONE (OUTPATIENT)
Dept: GASTROENTEROLOGY | Facility: CLINIC | Age: 80
End: 2025-04-02

## 2025-04-02 DIAGNOSIS — N18.9 CHRONIC KIDNEY DISEASE, UNSPECIFIED CKD STAGE: Primary | ICD-10-CM

## 2025-04-02 NOTE — TELEPHONE ENCOUNTER
PA start    Prior authorization for:  Skstephaniebetitamar Pen     Medication form: 150MG/ML auto-injectors loading dose and Maintenance dose    Submission method: fax to [facility] [place]    QF-TB result:   Component      Latest Ref Rng 3/4/2025   Quantiferon TB NIL      IU/mL 0.05    Quantiferon-TB1 Minus NIL      IU/mL -0.01    Quantiferon-TB2 Minus NIL      IU/mL -0.01    Quantiferon TB Mitogen minus NIL      IU/mL >10.00    Quantiferon TB Result      Negative  Negative

## 2025-04-02 NOTE — TELEPHONE ENCOUNTER
Called Ms. Thayer to go over her kidney numbers since her creatinine is mildly elevated.  She has been taking the mesalamine enemas every other day and doing well.  Recommend stopping the mesalamine enemas.      She said the Skirizi has been approved but there was an insurance issue. Recommend following up with her rheumatology team about this tomorrow so it can be started.      Also recommend trying to expedite her appt with nephrology since she doesn't  have an appointment until July. She expressed understanding and is amenable with the plan.    Tita Jacobsen MD  [facility]

## 2025-04-03 ENCOUNTER — TELEPHONE (OUTPATIENT)
Dept: NEPHROLOGY | Facility: CLINIC | Age: 80
End: 2025-04-03

## 2025-04-03 RX ORDER — RISANKIZUMAB-RZAA 150 MG/ML
150 INJECTION SUBCUTANEOUS
Qty: 1 ML | Refills: 3 | Status: SHIPPED | OUTPATIENT
Start: 2025-04-03 | End: 2025-05-01

## 2025-04-03 RX ORDER — RISANKIZUMAB-RZAA 150 MG/ML
150 INJECTION SUBCUTANEOUS
Qty: 2 ML | Refills: 0 | Status: SHIPPED | OUTPATIENT
Start: 2025-04-03 | End: 2025-05-01

## 2025-04-03 NOTE — TELEPHONE ENCOUNTER
PA start    Prior authorization for: Skyrizi pen    Medication form: 150 mg loading dose & maintenance dose    Submission method: over the phone [phone]    Tracking #:    QF-TB result:   Component      Latest Ref Rng 3/4/2025   Quantiferon TB NIL      IU/mL 0.05    Quantiferon-TB1 Minus NIL      IU/mL -0.01    Quantiferon-TB2 Minus NIL      IU/mL -0.01    Quantiferon TB Mitogen minus NIL      IU/mL >10.00    Quantiferon TB Result      Negative  Negative

## 2025-04-03 NOTE — TELEPHONE ENCOUNTER
Called patient: no sooner consult appt maryuri- scheduled 7/7/25 with Dr. Mehta.    Offered [facility]; next available is 2nd week of April 2025.    Agreed to call [facility], contact information given.

## 2025-04-03 NOTE — TELEPHONE ENCOUNTER
Override code will be needed. Will need to call help desk if doesn't show up due to max dose exceeded. This is for the loading dose  PA Approved    Prior authorization for:  Skyrizi    Medication form: 150 mg loading and maintenance doses    Approval #: [id number]    Approved dates: 01/03/2025 - 04/03/2026    Pharmacy for medication: [facility]    QF-TB results:   Component      Latest Ref Rng 3/4/2025   Quantiferon TB NIL      IU/mL 0.05    Quantiferon-TB1 Minus NIL      IU/mL -0.01    Quantiferon-TB2 Minus NIL      IU/mL -0.01    Quantiferon TB Mitogen minus NIL      IU/mL >10.00    Quantiferon TB Result      Negative  Negative

## 2025-04-03 NOTE — TELEPHONE ENCOUNTER
Called patient verified patient name and . Informed patient that we did get her Skyrizi approved and we are working on getting the prescriptions signed and sent to her pharmacy [facility]. Patient verbalized understanding and stated she took her last Enbrel today.    Dr. Villarreal - prescriptions pended for your review and approval.

## 2025-04-03 NOTE — TELEPHONE ENCOUNTER
Received fax back they didn't recognize patient when they received the PA we faxed in. Attempting to call new phone number received.    Called [employer] [place] [employer] number @ [phone] spoke with Meenakshi and patient was pulled up in the system but to do verbal PA need to be transferred to PA dept whose number is [phone] ([facility]). Per [facility] patient termed on 06/30/2013.    Called 1st number back again and spoke to Siria and she stated there is an active plan and she is also part of [employer]. PA started over the phone.     28-Oct-2024 17:48

## 2025-04-03 NOTE — TELEPHONE ENCOUNTER
Hello-that is great news.  Pended orders for Skyrizi (loading dose/maintenance dose) signed.  Thank you.

## 2025-04-03 NOTE — TELEPHONE ENCOUNTER
Patient states that she has a scheduled consult appointment for 7/7/2025, but her GI physician Dr Herbert Jacobsen is requesting for the patient to be seen sooner then first available by any Nephrologist in the group.

## 2025-04-14 NOTE — PROGRESS NOTES
Nephrology Consult Note    REASON FOR CONSULT: chronic kidney disease    HPI:   Jaz Thayer is a 79 year old female with history of seropositive RA/psoriatic arthritis, ulcerative colitis and HLD who presents in consultation for evaluation and management of chronic kidney disease stage 3a.     She has history of RA/psoriatic arthritis since her 30s for which she was most recently on Enbrel. Had been on Humira and methotrexate without improvement in the past. She was recently diagnosed with UC and switched to Skyrizi. Had been using mesalamine enemas every other day for colitis as well while waiting for approval for Skyrizi.     Has had a serum creatinine of around 0.9-1.1 mg/dL since at least 2014. Most recent UA with trace protein by dipstick. Renal ultrasound in 10/2024 with normal sized kidneys with normal echogenivity. Reports that her sisters have been told they have chronic kidney disease as well, not told the cause.     Denies gross hematuria. Reports that her urine has been slightly \"sudsy\" for some time. Takes Advil PM about 3 nights a week for many years.     ROS:    Denies fever/chills  Denies wt loss/gain  Denies HA or visual changes  Denies CP or palpitations  Denies SOB/cough/hemoptysis  Denies abd or flank pain  Denies N/V/D  Denies gross hematuria  Denies LE edema  Denies skin rashes/myalgias/arthralgias    PMH:  Past Medical History[1]    PSH:  Past Surgical History[2]    Medications (Active prior to today's visit):  Current Medications[3]    Allergies:  Allergies[4]    Social History:  Social Hx on file[5]     Family History:  Reports sisters with history of CKD    PHYSICAL EXAM:   /76 (BP Location: Left arm, Patient Position: Sitting)   Wt 185 lb 8 oz (84.1 kg)   BMI 30.87 kg/m²    Wt Readings from Last 3 Encounters:   04/15/25 185 lb 8 oz (84.1 kg)   03/25/25 184 lb (83.5 kg)   03/19/25 184 lb (83.5 kg)     General: Alert and oriented in no apparent distress.  HEENT: No scleral  icterus, MMM  Neck: Supple, no CADE or thyromegaly  Cardiac: Regular rate and rhythm, S1, S2 normal  Lungs: Clear without wheezes, rales, rhonchi.    Abdomen: Soft, non-tender.   Extremities: Without clubbing, cyanosis or edema.  Neurologic:  normal affect, moving all extremities  Skin: Warm and dry, no rashes    DATA:     BMP 4/1/2025  Component  Ref Range & Units (hover) 4/1/25  4:08 PM   Glucose 92   Sodium 141   Potassium 4.3   Chloride 105   CO2 27.0   Anion Gap 9   BUN 15   Creatinine 1.13 High    BUN/CREA Ratio 13.3   Calcium, Total 9.4   Calculated Osmolality 292   eGFR-Cr 50 Low    Patient Fasting for BMP? No       ASSESSMENT/PLAN:      1) Chronic kidney disease stage 3a: History of baseline serum creatinine ~0.9-1.1 mg/dL since at least 2014. Renal ultrasound in 10/2024 showed normal sized kidneys with normal echogenicity. Her most recent UA did show trace protein by dipstick and she does note some \"sudsy\" urine for some time. Will quantify proteinuria. Given stability of renal function since at least 2014, less likely to be GN. However, does have history of RA which has been associated with GN as well as secondary amyloid d/t chronic inflammation so will be important to also rule out abnormal urinary sediment. Mesalamine has been associated with CKD but her CKD predates initiation of mesalamine. Repeat BMP next month. Encouraged her to avoid chronic NSAID use as much as possible.     2) Ulcerative colitis: recently diagnosed, follows with Dr. Jacobsen. Recently started Skyrizi and off mesalamine.     3) Seropositive RA/psoriatic arthritis: Follows with Rheumatology. Previously on Enbrel and recently switched to Skyrizi due to new UC diagnosis.     Thank you for allowing me to participate in this patient's care. Please feel free to call me with any questions or concerns.     Bhumi Augustin MD       [1]   Past Medical History:   Allergic rhinitis    Anesthesia complication    difficulty waking up    Anxiety     Anxiety state    Arthritis    I take medication for RA    Back problem    Cataract    Esophageal reflux    Osteoarthritis    Problems with swallowing    Rheumatoid arthritis (HCC)    Sleep apnea    cpap    TIA (transient ischemic attack)    Visual impairment    reading glasses   [2]   Past Surgical History:  Procedure Laterality Date    Appendectomy  2015    Back surgery  2021     L4-5 extreme lateral interbody fusion with metallic cage and cadaver bone graft through the side, plus L4 inferior L3 laminectomy, foraminotomy, L4-5 discectomy and decompression of the spinal nerves with metallic L4-5 trans facet through the back    Cataract  2016    Surgery    Colonoscopy  2006    Colonoscopy N/A 2024    Dr. Connell; diverticulosis, polyp, colitis    Colonoscopy N/A 2024    Procedure: COLONOSCOPY;  Surgeon: Fabiana Connell MD;  Location: [facility] ENDOSCOPY    [facility]  1976    Upper gi endoscopy,exam     [3]   Current Outpatient Medications   Medication Sig Dispense Refill    Mirabegron ER (MYRBETRIQ) 50 MG Oral Tablet 24 Hr Take 1 tablet (50 mg total) by mouth daily. 30 tablet 11    LORazepam 1 MG Oral Tab Take 1 tablet (1 mg total) by mouth daily as needed for Anxiety. 30 tablet 2    Multiple Vitamin (ONE-DAILY MULTI VITAMINS) Oral Tab Take 1 tablet by mouth daily.      fluticasone propionate 50 MCG/ACT Nasal Suspension 2 sprays by Nasal route daily. 3 each 1    Risankizumab-rzaa (SKYRIZI PEN) 150 MG/ML Subcutaneous Solution Auto-injector Inject 150 mg into the skin every 28 days for 28 days. Inject on week 0 and week 4. Then every 90 days 2 mL 0    Risankizumab-rzaa (SKYRIZI PEN) 150 MG/ML Subcutaneous Solution Auto-injector Inject 150 mg into the skin every 3 (three) months for 28 days. 1 mL 3    Mesalamine 4 g Rectal Enema Place 1 enema (4 g total) rectally every evening. 30 enema 11    Na Sulfate-K Sulfate-Mg Sulf (SUPREP BOWEL PREP KIT) 17.5-3.13-1.6 GM/177ML Oral Solution  Take as directed 177 mL 0    pantoprazole 40 MG Oral Tab EC Take 1 tablet (40 mg total) by mouth every morning before breakfast. 90 tablet 2   [4] No Known Allergies  [5]   Social History  Socioeconomic History    Marital status:    Tobacco Use    Smoking status: Never     Passive exposure: Never    Smokeless tobacco: Never   Vaping Use    Vaping status: Never Used   Substance and Sexual Activity    Alcohol use: Yes     Comment: Socially twice a month    Drug use: Never    Sexual activity: Not Currently   Other Topics Concern    Caffeine Concern Yes    Stress Concern No    Weight Concern No    Special Diet No    Exercise No    Seat Belt Yes

## 2025-04-15 ENCOUNTER — OFFICE VISIT (OUTPATIENT)
Dept: NEPHROLOGY | Facility: CLINIC | Age: 80
End: 2025-04-15
Payer: MEDICARE

## 2025-04-15 VITALS — SYSTOLIC BLOOD PRESSURE: 130 MMHG | DIASTOLIC BLOOD PRESSURE: 76 MMHG | WEIGHT: 185.5 LBS | BODY MASS INDEX: 31 KG/M2

## 2025-04-15 DIAGNOSIS — M05.79 SEROPOSITIVE RHEUMATOID ARTHRITIS OF MULTIPLE SITES (HCC): ICD-10-CM

## 2025-04-15 DIAGNOSIS — E78.2 MIXED HYPERLIPIDEMIA: ICD-10-CM

## 2025-04-15 DIAGNOSIS — N18.31 STAGE 3A CHRONIC KIDNEY DISEASE (HCC): Primary | ICD-10-CM

## 2025-04-15 PROCEDURE — 99204 OFFICE O/P NEW MOD 45 MIN: CPT | Performed by: INTERNAL MEDICINE

## 2025-05-05 ENCOUNTER — LAB ENCOUNTER (OUTPATIENT)
Dept: LAB | Facility: HOSPITAL | Age: 80
End: 2025-05-05
Attending: INTERNAL MEDICINE
Payer: MEDICARE

## 2025-05-05 ENCOUNTER — TELEPHONE (OUTPATIENT)
Facility: CLINIC | Age: 80
End: 2025-05-05

## 2025-05-05 DIAGNOSIS — N18.9 CHRONIC KIDNEY DISEASE, UNSPECIFIED CKD STAGE: ICD-10-CM

## 2025-05-05 LAB
ANION GAP SERPL CALC-SCNC: 10 MMOL/L (ref 0–18)
BUN BLD-MCNC: 15 MG/DL (ref 9–23)
BUN/CREAT SERPL: 13 (ref 10–20)
CALCIUM BLD-MCNC: 9.6 MG/DL (ref 8.7–10.4)
CHLORIDE SERPL-SCNC: 104 MMOL/L (ref 98–112)
CO2 SERPL-SCNC: 25 MMOL/L (ref 21–32)
CREAT BLD-MCNC: 1.15 MG/DL (ref 0.55–1.02)
EGFRCR SERPLBLD CKD-EPI 2021: 49 ML/MIN/1.73M2 (ref 60–?)
FASTING STATUS PATIENT QL REPORTED: NO
GLUCOSE BLD-MCNC: 102 MG/DL (ref 70–99)
OSMOLALITY SERPL CALC.SUM OF ELEC: 289 MOSM/KG (ref 275–295)
POTASSIUM SERPL-SCNC: 4 MMOL/L (ref 3.5–5.1)
SODIUM SERPL-SCNC: 139 MMOL/L (ref 136–145)

## 2025-05-05 PROCEDURE — 80048 BASIC METABOLIC PNL TOTAL CA: CPT

## 2025-05-05 PROCEDURE — 36415 COLL VENOUS BLD VENIPUNCTURE: CPT

## 2025-05-05 NOTE — TELEPHONE ENCOUNTER
1st,Overdue reminder letter sent out via My chart for the following:  Basic Metabolic Panel (8) (Order #[id number]) on 4/2/25

## 2025-05-05 NOTE — TELEPHONE ENCOUNTER
I spoke to the patient    Patient states that she received a notification on [employer] stating that she has an overdue lab result (BMP)    Patient states that she completed the BMP that was ordered by Dr. Jacobsen on 4/1/2025 (same day it was ordered)    I informed patient that another BMP was ordered on 4/2/2025 due to mildly elevated creatinine (see TE from 4/2/2025)    Patient states that she will go to the lab today to have BMP completed    Patient verbalized understanding and has no further questions at this time

## 2025-05-06 ENCOUNTER — LAB ENCOUNTER (OUTPATIENT)
Dept: LAB | Facility: HOSPITAL | Age: 80
End: 2025-05-06
Attending: RADIOLOGY
Payer: MEDICARE

## 2025-05-06 DIAGNOSIS — M05.79 SEROPOSITIVE RHEUMATOID ARTHRITIS OF MULTIPLE SITES (HCC): ICD-10-CM

## 2025-05-06 DIAGNOSIS — E78.2 MIXED HYPERLIPIDEMIA: ICD-10-CM

## 2025-05-06 DIAGNOSIS — N18.31 STAGE 3A CHRONIC KIDNEY DISEASE (HCC): ICD-10-CM

## 2025-05-06 LAB
ALBUMIN SERPL-MCNC: 4.3 G/DL (ref 3.2–4.8)
ALBUMIN/GLOB SERPL: 1.4 {RATIO} (ref 1–2)
ALP LIVER SERPL-CCNC: 80 U/L (ref 55–142)
ALT SERPL-CCNC: 12 U/L (ref 10–49)
ANION GAP SERPL CALC-SCNC: 8 MMOL/L (ref 0–18)
AST SERPL-CCNC: 17 U/L (ref ?–34)
BILIRUB SERPL-MCNC: 0.4 MG/DL (ref 0.2–1.1)
BILIRUB UR QL: NEGATIVE
BUN BLD-MCNC: 15 MG/DL (ref 9–23)
BUN/CREAT SERPL: 14.4 (ref 10–20)
CALCIUM BLD-MCNC: 9.2 MG/DL (ref 8.7–10.4)
CHLORIDE SERPL-SCNC: 104 MMOL/L (ref 98–112)
CLARITY UR: CLEAR
CO2 SERPL-SCNC: 24 MMOL/L (ref 21–32)
COLOR UR: COLORLESS
CREAT BLD-MCNC: 1.04 MG/DL (ref 0.55–1.02)
CREAT UR-SCNC: 64.8 MG/DL
CREAT UR-SCNC: 64.8 MG/DL
EGFRCR SERPLBLD CKD-EPI 2021: 55 ML/MIN/1.73M2 (ref 60–?)
FASTING STATUS PATIENT QL REPORTED: NO
GLOBULIN PLAS-MCNC: 3.1 G/DL (ref 2–3.5)
GLUCOSE BLD-MCNC: 93 MG/DL (ref 70–99)
GLUCOSE UR-MCNC: NORMAL MG/DL
HGB UR QL STRIP.AUTO: NEGATIVE
KETONES UR-MCNC: NEGATIVE MG/DL
LEUKOCYTE ESTERASE UR QL STRIP.AUTO: NEGATIVE
MICROALBUMIN UR-MCNC: <0.3 MG/DL
NITRITE UR QL STRIP.AUTO: NEGATIVE
OSMOLALITY SERPL CALC.SUM OF ELEC: 283 MOSM/KG (ref 275–295)
PH UR: 5 [PH] (ref 5–8)
POTASSIUM SERPL-SCNC: 4.2 MMOL/L (ref 3.5–5.1)
PROT SERPL-MCNC: 7.4 G/DL (ref 5.7–8.2)
PROT UR-MCNC: <6 MG/DL (ref ?–14)
PROT UR-MCNC: NEGATIVE MG/DL
SODIUM SERPL-SCNC: 136 MMOL/L (ref 136–145)
SP GR UR STRIP: 1.01 (ref 1–1.03)
UROBILINOGEN UR STRIP-ACNC: NORMAL

## 2025-05-06 PROCEDURE — 82043 UR ALBUMIN QUANTITATIVE: CPT

## 2025-05-06 PROCEDURE — 80053 COMPREHEN METABOLIC PANEL: CPT

## 2025-05-06 PROCEDURE — 82570 ASSAY OF URINE CREATININE: CPT

## 2025-05-06 PROCEDURE — 84156 ASSAY OF PROTEIN URINE: CPT

## 2025-05-06 PROCEDURE — 81003 URINALYSIS AUTO W/O SCOPE: CPT

## 2025-05-06 PROCEDURE — 36415 COLL VENOUS BLD VENIPUNCTURE: CPT

## 2025-05-08 ENCOUNTER — NURSE ONLY (OUTPATIENT)
Age: 80
End: 2025-05-08
Payer: MEDICARE

## 2025-05-08 DIAGNOSIS — M05.79 SEROPOSITIVE RHEUMATOID ARTHRITIS OF MULTIPLE SITES (HCC): Primary | ICD-10-CM

## 2025-05-08 PROCEDURE — 99211 OFF/OP EST MAY X REQ PHY/QHP: CPT | Performed by: INTERNAL MEDICINE

## 2025-05-08 NOTE — PROGRESS NOTES
Patient came in for Skyrizi teaching. Name and  verified. Patient took first dose in home and had some of the medication leak and came in today to get a little more training on the pen since it is a bit more different than the Enbrel pen than she thought it would be. Patient educated on proper handling/storage/disposal of medications. Patient informed of proper injection and aseptic technique. Patient educated on potential side effects. Educational materials given to patient on medication.  Patient practiced several times with demo pen with great technique. Patient questions and concerns answered. Patient instructed to call us with any further questions.  25 min spent in education session. Patient aware to complete monitoring labs in 1 month and then follow up with provider.    Patient was using her upper thigh and not pinching the skin and holding when injecting. Showed patient injecting into the thigh and abdomen. Patient kept losing  on her skin when trying to inject in thigh. When doing abdomen was able to do proper injection technique.

## 2025-05-27 ENCOUNTER — TELEPHONE (OUTPATIENT)
Dept: GASTROENTEROLOGY | Facility: CLINIC | Age: 80
End: 2025-05-27

## 2025-05-27 ENCOUNTER — TELEPHONE (OUTPATIENT)
Dept: RHEUMATOLOGY | Facility: CLINIC | Age: 80
End: 2025-05-27

## 2025-05-27 ENCOUNTER — VIRTUAL PHONE E/M (OUTPATIENT)
Dept: GASTROENTEROLOGY | Facility: CLINIC | Age: 80
End: 2025-05-27
Payer: MEDICARE

## 2025-05-27 DIAGNOSIS — K51.30 ULCERATIVE RECTOSIGMOIDITIS WITHOUT COMPLICATION (HCC): Primary | ICD-10-CM

## 2025-05-27 DIAGNOSIS — L40.50 PSORIATIC ARTHRITIS (HCC): ICD-10-CM

## 2025-05-27 PROCEDURE — G2252 BRIEF CHKIN BY MD/QHP, 11-20: HCPCS | Performed by: INTERNAL MEDICINE

## 2025-05-27 NOTE — PROGRESS NOTES
Virtual Telephone Check-In    Jaz Thayer verbally consents to a Virtual/Telephone Check-In visit on 05/27/25.  Patient has been referred to the [facility] website at [email or web address] to review the yearly Consent to Treat document.    Patient understands and accepts financial responsibility for any deductible, co-insurance and/or co-pays associated with this service.    Duration of the service: 30 minutes                                                                                            [facility] - Gastroenterology                                                                                                                 Chief Complaint   Patient presents with    Follow - Up       HPI:   Jaz Thayer is a 79 year old year-old female with history of psoriatic arthritis here for the following:      Renal function is improving off the mesalamine.  Most recent Cr is 1.04. Pt saw nephrology and it seems her CKD predates the mesalamine.  She was advised to avoid NSAIDs.     Pt is on Skirizi and her bowels are moving better - she averages 2-3 BM per day and stools are mushy but slowly improving in consistency.  Her arthritis is uncontrolled right now but pt was told it can take some time.  Pt was just started on it earlier this month.     Patient currently denies any GI symptoms of nausea, vomiting, dyspepsia, dysphagia, hematemesis, abdominal pain, change in bowel habits, thin stools, hematochezia, or melena.  Additionally there is no weight loss and no reported history of chest pain or shortness of breath.    Video swallow test 2021 - done for oropharyngeal dysphagia was unremarkable.     Denies family h/o CRC.     Prior endoscopies:  CLN over 10 years ago - negative for polyps.   FOBT negative 2019.      EGD 10/2023  Impression:  1. A 5-10 mm Zenker's diverticulum noted in the proximal esophagus. The patient's choking may be due to the Zenker's diverticulum.    2. Non-obstructing  Schatzki's ring s/p disruption in four quadrants with cold forceps biopsies.    3. 3 cm hiatal hernia.   4. Mild gastritis biopsied.      Recommend:  1. Await pathology.  2. Schedule the esophagram when able.   3. Follow up with ENT after the esophagram to further discuss management of the Zenker's diverticulum.   4.  Call [phone] to set up an appointment with one of the allergy doctors for your persistent post nasal drip and congestion.   5. Start pantoprazole 40 mg daily for acid reflux.   6. Follow up with Dr. Jacobsen in after the esophagram is done and after you see ENT and allergy.      Path  Final Diagnosis:      A. Gastric; biopsies:  Mild chronic inactive gastritis.  Diff-Quik stain (appropriate control reactivity) shows no definitive evidence of H. pylori-like microorganisms.  No evidence of dysplasia or carcinoma identified.     B. Esophagus; biopsies:   Fragments of squamous mucosa with features of reflux esophagitis.  Minute strips of associated gastric glandular type mucosa with mild chronic inflammation.  No evidence of intestinal metaplasia, dysplasia or carcinoma identified.     Colonoscopy 12/31/2024  Impression:   Mild-moderate inflammation involving the rectum and sigmoid to 30 cm from the anal verge, likely ulcerative colitis. Biopsied.   Normal terminal ileum. Biopsied.  One sub-centimeter polyp removed.    The colon was otherwise normal with glistening mucosa and intact vascular pattern. Biopsied.     Final Diagnosis:      A. Ileum; biopsy:  Small intestinal mucosa without significant histopathology.  Intact villous architecture identified.     B. Ascending colon polyp (x1):   Tubular adenoma.     C. Right colon; biopsy:  Colonic mucosa without significant histopathology.  No active colitis, granulomas, or changes associated with chronicity identified.     D. Transverse colon; biopsy:  Colonic mucosa without significant histopathology.  No active colitis, granulomas, or changes associated  with chronicity identified.     E. Left colon; biopsy:  Colonic mucosa without significant histopathology.  No active colitis, granulomas, or changes associated with chronicity identified.     F.  Colon (region of colitis); biopsy:  Colonic mucosa with mild active colitis, lamina propria lymphoplasmacytosis, and focal mild architectural distortion (see comment).  No granulomas or dysplasia identified.     Comment:  Differential considerations for the findings in specimen F include infection, diverticulosis, medications, and inflammatory bowel disease.  Clinical and endoscopic correlation is recommended.       Soc:  -denies smoking  -denies heavy Etoh  -no illicit drug use    Wt Readings from Last 6 Encounters:   04/15/25 185 lb 8 oz (84.1 kg)   03/25/25 184 lb (83.5 kg)   03/19/25 184 lb (83.5 kg)   02/14/25 183 lb 12.8 oz (83.4 kg)   01/30/25 181 lb (82.1 kg)   12/24/24 179 lb (81.2 kg)        History, Medications, Allergies, ROS:      Past Medical History:    Allergic rhinitis    Anesthesia complication    difficulty waking up    Anxiety    Anxiety state    Arthritis    I take medication for RA    Back problem    Cataract    Esophageal reflux    Osteoarthritis    Problems with swallowing    Rheumatoid arthritis (HCC)    Sleep apnea    cpap    TIA (transient ischemic attack)    Visual impairment    reading glasses      Past Surgical History:   Procedure Laterality Date    Appendectomy  09/16/2015    Back surgery  09/14/2021     L4-5 extreme lateral interbody fusion with metallic cage and cadaver bone graft through the side, plus L4 inferior L3 laminectomy, foraminotomy, L4-5 discectomy and decompression of the spinal nerves with metallic L4-5 trans facet through the back    Cataract  2016    Surgery    Colonoscopy  2006    Colonoscopy N/A 12/31/2024    Dr. Connell; diverticulosis, polyp, colitis    Colonoscopy N/A 12/31/2024    Procedure: COLONOSCOPY;  Surgeon: Fabiana Connell MD;  Location: [facility]  ENDOSCOPY      1976    Upper gi endoscopy,exam        Family Hx:   Family History   Problem Relation Age of Onset    Arthritis Father     Other (Other) Father         stroke    Dementia Father     Heart Disorder Mother     Other (Other) Mother         stroke    Stroke Mother     Other (Other) Sister         ALS    Dementia Sister     Stroke Sister       Social History:   Social History     Socioeconomic History    Marital status:    Tobacco Use    Smoking status: Never     Passive exposure: Never    Smokeless tobacco: Never   Vaping Use    Vaping status: Never Used   Substance and Sexual Activity    Alcohol use: Yes     Comment: Socially twice a month    Drug use: Never    Sexual activity: Not Currently   Other Topics Concern    Caffeine Concern Yes    Stress Concern No    Weight Concern No    Special Diet No    Exercise No    Seat Belt Yes   Social History Narrative    The patient uses the following assistive device(s):  single-point cane.  temporary    The patient does live in a home with stairs.        Medications (Active prior to today's visit):  Current Outpatient Medications   Medication Sig Dispense Refill    Mirabegron ER (MYRBETRIQ) 50 MG Oral Tablet 24 Hr Take 1 tablet (50 mg total) by mouth daily. 30 tablet 11    Na Sulfate-K Sulfate-Mg Sulf (SUPREP BOWEL PREP KIT) 17.5-3.13-1.6 GM/177ML Oral Solution Take as directed 177 mL 0    LORazepam 1 MG Oral Tab Take 1 tablet (1 mg total) by mouth daily as needed for Anxiety. 30 tablet 2    pantoprazole 40 MG Oral Tab EC Take 1 tablet (40 mg total) by mouth every morning before breakfast. 90 tablet 2    Multiple Vitamin (ONE-DAILY MULTI VITAMINS) Oral Tab Take 1 tablet by mouth daily.      fluticasone propionate 50 MCG/ACT Nasal Suspension 2 sprays by Nasal route daily. 3 each 1       Allergies:  No Known Allergies    ROS:   CONSTITUTIONAL:  negative for fevers, rigors  EYES:  negative for diplopia   RESPIRATORY:  negative for severe shortness of  breath  CARDIOVASCULAR:  negative for crushing sub-sternal chest pain  GASTROINTESTINAL:  see HPI  GENITOURINARY:  negative for dysuria or gross hematuria  INTEGUMENT/BREAST:  SKIN:  negative for jaundice   ALLERGIC/IMMUNOLOGIC:  negative for hay fever  ENDOCRINE:  negative for cold intolerance and heat intolerance  MUSCULOSKELETAL:  negative for joint effusion/severe erythema  BEHAVIOR/PSYCH:  negative for psychotic behavior      PHYSICAL EXAM:   There were no vitals taken for this visit.    Phone visit.     Labs/Imaging:     Patient's pertinent labs and imaging were reviewed and discussed with patient today.      .  ASSESSMENT/PLAN:   79 F with h/o psoriatic arthritis on enbrel here for the following:    Ulcerative proctosigmoiditis   H/o CKD on mesalamine enemas  H/o psoriatic arthritis  HPI:  pt developed hematochezia 9/2024, calprotectin was > 800 with normal CRP, and CLN 12/2024 showed mild ulcerative pancolitis up to 30 cm from the anal verge.  She was started on mesalamine 4 g enema at bedtime and symptoms resolved after a couple of days.  She was tolerating the enema OK but her Creatinine was worsening slowly, after which the mesalamine was stopped. Pt saw nephrology 4/2025.  Recommend avoiding mesalamine and other nephrotoxic agents. She was started on Skirizi earlier this month and her stools are getting more formed again but her arthritis is flaring. Recommend following up with rheumatology to see if she needs bridging until the Skirizi is fully effective.  Of note, pt failed Humira in the past.      Oropharyngeal dysphagia, choking 2/2 Zenker's diverticulum - video swallow in 2021 was unremarkable. ENT eval was unremarkable in the past but she was advised to follow up with ENT after her EGD in 2023, which showed a Zenker's diverticulum - she just did yesterday and her options were discussed.  She was advised to RTC if symptoms worsen.     GERD with esophagitis  Non-obstructing Schatzki's ring - noted on  EGD 10/2023.  Symptoms are well controlled with pantoprazole 40 mg daily-PRN.     CRC adenoma, CRC surveillance - s/p CLN 12/2024 that showed ulcerative proctosigmoiditis and a small TA in the ascending colon that was removed.  She will need a CLN in 6-12 months once her disease has been stable on Skirizi, see above.    Recommend    - Continue Skirizi - ordered by rheumatology given psoriatic arthritis     - Follow up in ~2 months to ensure GI symptoms have resolved with this medication    - Repeat CLN in 6-12 months once in clinical remission on Skirizi    - Continue pantoprazole 40 mg daily-PRN    - Stay up to date on vaccines, including annual influenza and COVID    - Stay up to date on mammograms, PAP smears,and other age related screening tests      Orders This Visit:  No orders of the defined types were placed in this encounter.      Meds This Visit:  Requested Prescriptions      No prescriptions requested or ordered in this encounter       Imaging & Referrals:  None         Tita Jacobsen MD          This note was partially prepared using Dragon [employer] voice recognition dictation software. As a result, errors may occur. When identified, these errors have been corrected. While every attempt is made to correct errors during dictation, discrepancies may still exist.

## 2025-05-27 NOTE — TELEPHONE ENCOUNTER
Patient was prescribed     Risankizumab-rzaa (SKYRIZI PEN) 150 MG/ML Subcutaneous Solution Auto-injector ()     and she states the medication is not working. She is still in a lot of pain; please call.

## 2025-05-27 NOTE — TELEPHONE ENCOUNTER
GI staff - please call to set up follow up with me in ~2 months. Virtual is OK if needed   Impression: Other secondary cataract, bilateral: H26.493. Zhang Razor Visually significant, quality of life issue, could improve with surgery. Plan: Discussed all risks, benefits, alternatives, procedures and recovery. Patient understands changing glasses will not improve vision. Patient desires to have surgery, recommend yag capsulotomy OS.

## 2025-05-28 NOTE — TELEPHONE ENCOUNTER
Patient is following up on the message of 5/27/25.  Patient mentions she in a lot of pain and will traveling this Friday.    Patient is requesting, medication, a shot or anything the doctor feels would help.

## 2025-05-28 NOTE — TELEPHONE ENCOUNTER
Name and  verified. Pt reported pain started  and worsended on Monday. It is in her right shoulder, hand, and knee. She reported swelling. She is leaving on a trip Friday at 5 am. Pt was given appointment for tomorrow at 8:40 am for possible injections. She has taken her first 2 doses of Skyrizi.

## 2025-05-29 ENCOUNTER — OFFICE VISIT (OUTPATIENT)
Age: 80
End: 2025-05-29
Payer: MEDICARE

## 2025-05-29 VITALS
DIASTOLIC BLOOD PRESSURE: 79 MMHG | SYSTOLIC BLOOD PRESSURE: 133 MMHG | BODY MASS INDEX: 30.09 KG/M2 | HEART RATE: 71 BPM | RESPIRATION RATE: 16 BRPM | WEIGHT: 180.63 LBS | HEIGHT: 65 IN

## 2025-05-29 DIAGNOSIS — Z51.81 ENCOUNTER FOR THERAPEUTIC DRUG MONITORING: ICD-10-CM

## 2025-05-29 DIAGNOSIS — M06.4 INFLAMMATORY POLYARTHRITIS (HCC): ICD-10-CM

## 2025-05-29 DIAGNOSIS — K51.919 ULCERATIVE COLITIS WITH COMPLICATION, UNSPECIFIED LOCATION (HCC): ICD-10-CM

## 2025-05-29 DIAGNOSIS — M25.511 ACUTE PAIN OF RIGHT SHOULDER: ICD-10-CM

## 2025-05-29 DIAGNOSIS — M05.79 SEROPOSITIVE RHEUMATOID ARTHRITIS OF MULTIPLE SITES (HCC): Primary | ICD-10-CM

## 2025-05-29 DIAGNOSIS — L40.50 PSORIATIC ARTHRITIS (HCC): ICD-10-CM

## 2025-05-29 PROCEDURE — 99214 OFFICE O/P EST MOD 30 MIN: CPT | Performed by: INTERNAL MEDICINE

## 2025-05-29 PROCEDURE — 20610 DRAIN/INJ JOINT/BURSA W/O US: CPT | Performed by: INTERNAL MEDICINE

## 2025-05-29 RX ORDER — TRIAMCINOLONE ACETONIDE 40 MG/ML
40 INJECTION, SUSPENSION INTRA-ARTICULAR; INTRAMUSCULAR ONCE
Status: COMPLETED | OUTPATIENT
Start: 2025-05-29 | End: 2025-05-29

## 2025-06-12 ENCOUNTER — OFFICE VISIT (OUTPATIENT)
Age: 80
End: 2025-06-12
Payer: MEDICARE

## 2025-06-12 ENCOUNTER — LAB ENCOUNTER (OUTPATIENT)
Dept: LAB | Age: 80
End: 2025-06-12
Attending: INTERNAL MEDICINE
Payer: MEDICARE

## 2025-06-12 VITALS
HEART RATE: 68 BPM | DIASTOLIC BLOOD PRESSURE: 68 MMHG | HEIGHT: 65 IN | BODY MASS INDEX: 29.99 KG/M2 | WEIGHT: 180 LBS | OXYGEN SATURATION: 96 % | SYSTOLIC BLOOD PRESSURE: 124 MMHG

## 2025-06-12 DIAGNOSIS — N18.31 STAGE 3A CHRONIC KIDNEY DISEASE (HCC): ICD-10-CM

## 2025-06-12 DIAGNOSIS — R27.0 ATAXIA: Primary | ICD-10-CM

## 2025-06-12 DIAGNOSIS — M05.79 SEROPOSITIVE RHEUMATOID ARTHRITIS OF MULTIPLE SITES (HCC): ICD-10-CM

## 2025-06-12 DIAGNOSIS — R53.83 FATIGUE, UNSPECIFIED TYPE: ICD-10-CM

## 2025-06-12 DIAGNOSIS — R51.9 ACUTE NONINTRACTABLE HEADACHE, UNSPECIFIED HEADACHE TYPE: ICD-10-CM

## 2025-06-12 LAB
ALBUMIN SERPL-MCNC: 4.7 G/DL (ref 3.2–4.8)
ALBUMIN/GLOB SERPL: 1.6 {RATIO} (ref 1–2)
ALP LIVER SERPL-CCNC: 82 U/L (ref 55–142)
ALT SERPL-CCNC: 17 U/L (ref 10–49)
ANION GAP SERPL CALC-SCNC: 9 MMOL/L (ref 0–18)
AST SERPL-CCNC: 17 U/L (ref ?–34)
BASOPHILS # BLD AUTO: 0.04 X10(3) UL (ref 0–0.2)
BASOPHILS NFR BLD AUTO: 0.5 %
BILIRUB SERPL-MCNC: 0.4 MG/DL (ref 0.2–1.1)
BUN BLD-MCNC: 15 MG/DL (ref 9–23)
BUN/CREAT SERPL: 12.3 (ref 10–20)
CALCIUM BLD-MCNC: 9.9 MG/DL (ref 8.7–10.4)
CHLORIDE SERPL-SCNC: 103 MMOL/L (ref 98–112)
CO2 SERPL-SCNC: 29 MMOL/L (ref 21–32)
CREAT BLD-MCNC: 1.22 MG/DL (ref 0.55–1.02)
DEPRECATED RDW RBC AUTO: 39 FL (ref 35.1–46.3)
EGFRCR SERPLBLD CKD-EPI 2021: 45 ML/MIN/1.73M2 (ref 60–?)
EOSINOPHIL # BLD AUTO: 0.09 X10(3) UL (ref 0–0.7)
EOSINOPHIL NFR BLD AUTO: 1.2 %
ERYTHROCYTE [DISTWIDTH] IN BLOOD BY AUTOMATED COUNT: 14.7 % (ref 11–15)
FASTING STATUS PATIENT QL REPORTED: YES
GLOBULIN PLAS-MCNC: 3 G/DL (ref 2–3.5)
GLUCOSE BLD-MCNC: 103 MG/DL (ref 70–99)
HCT VFR BLD AUTO: 38 % (ref 35–48)
HGB BLD-MCNC: 11.9 G/DL (ref 12–16)
IMM GRANULOCYTES # BLD AUTO: 0.02 X10(3) UL (ref 0–1)
IMM GRANULOCYTES NFR BLD: 0.3 %
LYMPHOCYTES # BLD AUTO: 1.99 X10(3) UL (ref 1–4)
LYMPHOCYTES NFR BLD AUTO: 25.6 %
MCH RBC QN AUTO: 23.2 PG (ref 26–34)
MCHC RBC AUTO-ENTMCNC: 31.3 G/DL (ref 31–37)
MCV RBC AUTO: 73.9 FL (ref 80–100)
MONOCYTES # BLD AUTO: 0.75 X10(3) UL (ref 0.1–1)
MONOCYTES NFR BLD AUTO: 9.7 %
NEUTROPHILS # BLD AUTO: 4.88 X10 (3) UL (ref 1.5–7.7)
NEUTROPHILS # BLD AUTO: 4.88 X10(3) UL (ref 1.5–7.7)
NEUTROPHILS NFR BLD AUTO: 62.7 %
OSMOLALITY SERPL CALC.SUM OF ELEC: 293 MOSM/KG (ref 275–295)
PLATELET # BLD AUTO: 336 10(3)UL (ref 150–450)
POTASSIUM SERPL-SCNC: 4.5 MMOL/L (ref 3.5–5.1)
PROT SERPL-MCNC: 7.7 G/DL (ref 5.7–8.2)
RBC # BLD AUTO: 5.14 X10(6)UL (ref 3.8–5.3)
SODIUM SERPL-SCNC: 141 MMOL/L (ref 136–145)
WBC # BLD AUTO: 7.8 X10(3) UL (ref 4–11)

## 2025-06-12 PROCEDURE — 36415 COLL VENOUS BLD VENIPUNCTURE: CPT

## 2025-06-12 PROCEDURE — 99214 OFFICE O/P EST MOD 30 MIN: CPT | Performed by: INTERNAL MEDICINE

## 2025-06-12 PROCEDURE — 80053 COMPREHEN METABOLIC PANEL: CPT

## 2025-06-12 PROCEDURE — G2211 COMPLEX E/M VISIT ADD ON: HCPCS | Performed by: INTERNAL MEDICINE

## 2025-06-12 PROCEDURE — 85025 COMPLETE CBC W/AUTO DIFF WBC: CPT

## 2025-06-12 NOTE — PROGRESS NOTES
History of Present Illness  Jaz Thayer is a 79 year old female with rheumatoid arthritis and ulcerative colitis who presents with persistent dizziness and fatigue.    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 The dizziness is continuous, described as a sensation of the world moving, and occurs in all positions: sitting, standing, and lying down. There is no associated double or blurry vision, and her vision is fine with new glasses. She has not fallen due to dizziness but uses a cane for stability.    She reports extreme fatigue since starting a new medication, administered via injection in her stomach. This fatigue has been ongoing since the initiation of the treatment, significantly impacting her ability to engage in activities she previously enjoyed, such as going to the gym. She has not visited the gym in one to two months due to this exhaustion.    Initially, she experienced headaches after starting the new medication, but these have resolved since her recent trip to [place]. She keeps aspirin in her purse for headaches. No numbness or tingling sensations are reported. She has not experienced any falls related to her dizziness, although she did slip once, which was unrelated to her current symptoms.    Has long standing  HTN -  is on meds  no change  BP  good      Has  seen  GI  and  rheum    colon= polyp      Saw nephro also  for  CKD              Patient Active Problem List    Diagnosis Date Noted    Ulcerative colitis with complication (HCC) 02/14/2025    Encounter for therapeutic drug monitoring 04/18/2024    Encounter for other specified special examinations 04/18/2024    Calcific tendonitis of right shoulder 04/18/2024    Vaginal atrophy 01/23/2024    Urge incontinence 11/28/2023    Stage 3a chronic kidney disease (HCC) 07/21/2023    Stress incontinence 07/20/2023    L4-5 grade 2  spondylolisthesis 08/06/2021    L4-5 left moderate-severe, L5-S1 left severe/right moderate foraminal stenosis 08/06/2021    L4-5 large central herniated disc with moderate left cephald extruded fragment 08/06/2021    L4-5 severe, L3-4 moderate central stenosis 08/06/2021    Mixed hyperlipidemia 12/19/2020    Seropositive rheumatoid arthritis of multiple sites (HCC) 12/16/2015        Medications - Current[1]     Vitals:    06/12/25 1019   BP: 124/68   Pulse: 68   VITALSBody mass index is 29.95 kg/m².      General: looks healthy worried      Affect  and  cognition  normal     Has some ataxia  - rhomberg pos  no past pointing  no nystagmus  could not do tandom gait  - eomi       Neck;  nl     Lungs: clear     Heart: Regular     Murmur none           Jaz was seen today for dizziness.    Diagnoses and all orders for this visit:    Ataxia    Acute nonintractable headache, unspecified headache type    Seropositive rheumatoid arthritis of multiple sites (HCC)       Assessment & Plan  Dizziness and balance disturbance  Continuous dizziness and balance disturbance for two weeks, suggesting a central cause. MRI needed to rule out central nervous system pathology.  - Order MRI of the brain.  - Refer to neurology for further evaluation.    Fatigue  Severe fatigue likely due to Gyrizi, a known side effect. Blood work needed to evaluate other causes.  - Order blood work to evaluate causes of fatigue.    Headaches  Headaches initially after starting Gyrizi, resolved after travel. No further action needed.    BP  at goal  doubt  cause  of sxs  -  and  on no meds      F/u after MRI               This note was prepared using Dragon Medical voice recognition dictation software and as a result, errors may occur. When identified, these errors have been corrected. While every attempt is made to correct errors during dictation, discrepancies may still exist            [1]   Current Outpatient Medications:     diclofenac 50 MG Oral  Tab EC, Take Diclofenac as needed based up to 50 mg twice daily with food. Do not take other NSAIDs while on this medication., Disp: 60 tablet, Rfl: 0    Mirabegron ER (MYRBETRIQ) 50 MG Oral Tablet 24 Hr, Take 1 tablet (50 mg total) by mouth daily., Disp: 30 tablet, Rfl: 11    Na Sulfate-K Sulfate-Mg Sulf (SUPREP BOWEL PREP KIT) 17.5-3.13-1.6 GM/177ML Oral Solution, Take as directed, Disp: 177 mL, Rfl: 0    LORazepam 1 MG Oral Tab, Take 1 tablet (1 mg total) by mouth daily as needed for Anxiety., Disp: 30 tablet, Rfl: 2    pantoprazole 40 MG Oral Tab EC, Take 1 tablet (40 mg total) by mouth every morning before breakfast., Disp: 90 tablet, Rfl: 2    Multiple Vitamin (ONE-DAILY MULTI VITAMINS) Oral Tab, Take 1 tablet by mouth daily., Disp: , Rfl:

## 2025-06-12 NOTE — PROGRESS NOTES
The following individual(s) verbally consented to be recorded using ambient AI listening technology and understand that they can each withdraw their consent to this listening technology at any point by asking the clinician to turn off or pause the recording: YES    Patient name: Jaz Thayer  Additional names:

## 2025-07-15 ENCOUNTER — HOSPITAL ENCOUNTER (OUTPATIENT)
Dept: MRI IMAGING | Facility: HOSPITAL | Age: 80
Discharge: HOME | End: 2025-07-15
Attending: INTERNAL MEDICINE
Payer: MEDICARE

## 2025-07-15 DIAGNOSIS — R51.9 ACUTE NONINTRACTABLE HEADACHE, UNSPECIFIED HEADACHE TYPE: ICD-10-CM

## 2025-07-15 DIAGNOSIS — R27.0 ATAXIA: ICD-10-CM

## 2025-07-15 PROCEDURE — 70551 MRI BRAIN STEM W/O DYE: CPT | Performed by: INTERNAL MEDICINE

## 2025-07-17 ENCOUNTER — PATIENT MESSAGE (OUTPATIENT)
Dept: GASTROENTEROLOGY | Facility: CLINIC | Age: 80
End: 2025-07-17

## 2025-07-21 ENCOUNTER — RESULTS FOLLOW-UP (OUTPATIENT)
Age: 80
End: 2025-07-21

## 2025-07-24 ENCOUNTER — VIRTUAL PHONE E/M (OUTPATIENT)
Dept: GASTROENTEROLOGY | Facility: CLINIC | Age: 80
End: 2025-07-24

## 2025-07-24 DIAGNOSIS — K92.1 HEMATOCHEZIA: Primary | ICD-10-CM

## 2025-07-24 DIAGNOSIS — K51.30 ULCERATIVE RECTOSIGMOIDITIS WITHOUT COMPLICATION (HCC): ICD-10-CM

## 2025-07-24 PROCEDURE — G2252 BRIEF CHKIN BY MD/QHP, 11-20: HCPCS | Performed by: INTERNAL MEDICINE

## 2025-07-24 RX ORDER — BUDESONIDE 3 MG/1
9 CAPSULE, COATED PELLETS ORAL EVERY MORNING
Qty: 90 CAPSULE | Refills: 3 | Status: SHIPPED | OUTPATIENT
Start: 2025-07-24

## 2025-07-24 NOTE — PROGRESS NOTES
Virtual Telephone Check-In    Jaz Thayer verbally consents to a Virtual/Telephone Check-In visit on 07/24/25.  Patient has been referred to the [facility] website at [email or web address] to review the yearly Consent to Treat document.    Patient understands and accepts financial responsibility for any deductible, co-insurance and/or co-pays associated with this service.    Duration of the service: >30 minutes                                                                                              [facility] - Gastroenterology                                                                                                                 No chief complaint on file.      HPI:   Jaz Thayer is a 79 year old year-old female with history of psoriatic arthritis here for the following:      Pt has had 2 doses of skirizi. First dose March but didn't inject properly. Then second dose was 4/8, the third dose is upcoming 8/8.   3 weeks ago, pt developed worse diarrhea and bloody bowel movements. She is not eating as much to avoid bowel movements.  If she eats, she will have 3-4 loose and bloody bowel movements.  If she avoids eating during the day, her stools go down to 1-2x/day.  Denies significant abd pain, f/c, n/v, or any other symptoms.      Denies family h/o CRC.     Prior endoscopies:  CLN over 10 years ago - negative for polyps.   FOBT negative 2019.      EGD 10/2023  Impression:  1. A 5-10 mm Zenker's diverticulum noted in the proximal esophagus. The patient's choking may be due to the Zenker's diverticulum.    2. Non-obstructing Schatzki's ring s/p disruption in four quadrants with cold forceps biopsies.    3. 3 cm hiatal hernia.   4. Mild gastritis biopsied.      Recommend:  1. Await pathology.  2. Schedule the esophagram when able.   3. Follow up with ENT after the esophagram to further discuss management of the Zenker's diverticulum.   4.  Call [phone] to set up an appointment with  one of the allergy doctors for your persistent post nasal drip and congestion.   5. Start pantoprazole 40 mg daily for acid reflux.   6. Follow up with Dr. Jacobsen in after the esophagram is done and after you see ENT and allergy.      Path  Final Diagnosis:      A. Gastric; biopsies:  Mild chronic inactive gastritis.  Diff-Quik stain (appropriate control reactivity) shows no definitive evidence of H. pylori-like microorganisms.  No evidence of dysplasia or carcinoma identified.     B. Esophagus; biopsies:   Fragments of squamous mucosa with features of reflux esophagitis.  Minute strips of associated gastric glandular type mucosa with mild chronic inflammation.  No evidence of intestinal metaplasia, dysplasia or carcinoma identified.     Colonoscopy 12/31/2024  Impression:   Mild-moderate inflammation involving the rectum and sigmoid to 30 cm from the anal verge, likely ulcerative colitis. Biopsied.   Normal terminal ileum. Biopsied.  One sub-centimeter polyp removed.    The colon was otherwise normal with glistening mucosa and intact vascular pattern. Biopsied.     Final Diagnosis:      A. Ileum; biopsy:  Small intestinal mucosa without significant histopathology.  Intact villous architecture identified.     B. Ascending colon polyp (x1):   Tubular adenoma.     C. Right colon; biopsy:  Colonic mucosa without significant histopathology.  No active colitis, granulomas, or changes associated with chronicity identified.     D. Transverse colon; biopsy:  Colonic mucosa without significant histopathology.  No active colitis, granulomas, or changes associated with chronicity identified.     E. Left colon; biopsy:  Colonic mucosa without significant histopathology.  No active colitis, granulomas, or changes associated with chronicity identified.     F.  Colon (region of colitis); biopsy:  Colonic mucosa with mild active colitis, lamina propria lymphoplasmacytosis, and focal mild architectural distortion (see comment).  No  granulomas or dysplasia identified.     Comment:  Differential considerations for the findings in specimen F include infection, diverticulosis, medications, and inflammatory bowel disease.  Clinical and endoscopic correlation is recommended.       Soc:  -denies smoking  -denies heavy Etoh  -no illicit drug use    Wt Readings from Last 6 Encounters:   25 180 lb (81.6 kg)   25 180 lb 9.6 oz (81.9 kg)   04/15/25 185 lb 8 oz (84.1 kg)   25 184 lb (83.5 kg)   25 184 lb (83.5 kg)   25 183 lb 12.8 oz (83.4 kg)        History, Medications, Allergies, ROS:      Past Medical History:    Allergic rhinitis    Anesthesia complication    difficulty waking up    Anxiety    Anxiety state    Arthritis    I take medication for RA    Back problem    Cataract    Esophageal reflux    Osteoarthritis    Problems with swallowing    Rheumatoid arthritis (HCC)    Sleep apnea    cpap    TIA (transient ischemic attack)    Visual impairment    reading glasses      Past Surgical History:   Procedure Laterality Date    Appendectomy  2015    Back surgery  2021     L4-5 extreme lateral interbody fusion with metallic cage and cadaver bone graft through the side, plus L4 inferior L3 laminectomy, foraminotomy, L4-5 discectomy and decompression of the spinal nerves with metallic L4-5 trans facet through the back    Cataract  2016    Surgery    Colonoscopy      Colonoscopy N/A 2024    Dr. Connell; diverticulosis, polyp, colitis    Colonoscopy N/A 2024    Procedure: COLONOSCOPY;  Surgeon: Fabiana Connell MD;  Location: [facility] ENDOSCOPY    [facility]  1976    Upper gi endoscopy,exam        Family Hx:   Family History   Problem Relation Age of Onset    Arthritis Father     Other (Other) Father         stroke    Dementia Father     Heart Disorder Mother     Other (Other) Mother         stroke    Stroke Mother     Other (Other) Sister         ALS    Dementia Sister     Stroke Sister        Social History:   Social History     Socioeconomic History    Marital status:    Tobacco Use    Smoking status: Never     Passive exposure: Never    Smokeless tobacco: Never   Vaping Use    Vaping status: Never Used   Substance and Sexual Activity    Alcohol use: Yes     Comment: Socially twice a month    Drug use: Never    Sexual activity: Not Currently   Other Topics Concern    Caffeine Concern Yes    Stress Concern No    Weight Concern No    Special Diet No    Exercise No    Seat Belt Yes   Social History Narrative    The patient uses the following assistive device(s):  single-point cane.  temporary    The patient does live in a home with stairs.        Medications (Active prior to today's visit):  Current Outpatient Medications   Medication Sig Dispense Refill    budesonide 3 MG Oral Cap DR Particles Take 3 capsules (9 mg total) by mouth every morning. Take 3 capsules (9mg) every day in the morning for up to 8 weeks 90 capsule 3    diclofenac 50 MG Oral Tab EC Take Diclofenac as needed based up to 50 mg twice daily with food. Do not take other NSAIDs while on this medication. 60 tablet 0    Mirabegron ER (MYRBETRIQ) 50 MG Oral Tablet 24 Hr Take 1 tablet (50 mg total) by mouth daily. 30 tablet 11    Na Sulfate-K Sulfate-Mg Sulf (SUPREP BOWEL PREP KIT) 17.5-3.13-1.6 GM/177ML Oral Solution Take as directed 177 mL 0    LORazepam 1 MG Oral Tab Take 1 tablet (1 mg total) by mouth daily as needed for Anxiety. 30 tablet 2    pantoprazole 40 MG Oral Tab EC Take 1 tablet (40 mg total) by mouth every morning before breakfast. 90 tablet 2    Multiple Vitamin (ONE-DAILY MULTI VITAMINS) Oral Tab Take 1 tablet by mouth daily.         Allergies:  No Known Allergies    ROS:   CONSTITUTIONAL:  negative for fevers, rigors  EYES:  negative for diplopia   RESPIRATORY:  negative for severe shortness of breath  CARDIOVASCULAR:  negative for crushing sub-sternal chest pain  GASTROINTESTINAL:  see HPI  GENITOURINARY:   negative for dysuria or gross hematuria  INTEGUMENT/BREAST:  SKIN:  negative for jaundice   ALLERGIC/IMMUNOLOGIC:  negative for hay fever  ENDOCRINE:  negative for cold intolerance and heat intolerance  MUSCULOSKELETAL:  negative for joint effusion/severe erythema  BEHAVIOR/PSYCH:  negative for psychotic behavior      PHYSICAL EXAM:   There were no vitals taken for this visit.    Phone visit.     Labs/Imaging:     Patient's pertinent labs and imaging were reviewed and discussed with patient today.      .  ASSESSMENT/PLAN:   79 F with h/o psoriatic arthritis on enbrel here for the following:    Ulcerative proctosigmoiditis   H/o CKD on mesalamine enemas  H/o psoriatic arthritis  HPI:  pt developed hematochezia 9/2024, calprotectin was > 800 with normal CRP, and CLN 12/2024 showed mild ulcerative pancolitis up to 30 cm from the anal verge.  She was started on mesalamine 4 g enema at bedtime and symptoms resolved after a couple of days.  She was tolerating the enema OK but her Creatinine was worsening slowly, after which the mesalamine was stopped. Pt saw nephrology 4/2025.  Recommend avoiding mesalamine and other nephrotoxic agents. She was started on Skirizi Spring 2025 for psoriasis but the first loading dose may or may not have have been administered correctly since she had trouble deploying the pen. She at least received a dose in April and is due for her next dose in August. We discussed that she is in a flare or with infectious colitis given ongoing hematochezia the last 3 weeks. Recommend GI stool panel, C.diff, stool calprotectin and starting budesonide 9 mg daily.  We discussed that if her symptoms are uncontrolled with skyrizi q12 weeks, the dosing may need to be adjusted for UC (180-360 mg q8 weeks is typical dosing for UC - lowest effective dose).  Will keep close follow up in 2 weeks to ensure improvement in symptoms. Of note, pt failed Humira in the past.      Oropharyngeal dysphagia, choking 2/2  Zenker's diverticulum - video swallow in 2021 was unremarkable. ENT eval was unremarkable in the past but she was advised to follow up with ENT after her EGD in 2023, which showed a Zenker's diverticulum - she did and her options were discussed.  She was advised to RTC if symptoms worsen.     GERD with esophagitis  Non-obstructing Schatzki's ring - noted on EGD 10/2023.  Symptoms are well controlled with pantoprazole 40 mg daily-PRN.     CRC adenoma, CRC surveillance - s/p CLN 12/2024 that showed ulcerative proctosigmoiditis and a small TA in the ascending colon that was removed.  She will need a CLN in 6-12 months once her disease has been stable on Skirizi, see above.    Recommend    - GI stool panel, C.diff, stool calprotectin    - Budesonide 9 mg daily    - Continue Skirizi - ordered by rheumatology given psoriatic arthritis     - Follow up in 2 weeks    - Repeat CLN in 6-12 months once in clinical remission on Skirizi    - Continue pantoprazole 40 mg daily-PRN    - Stay up to date on vaccines, including annual influenza and COVID    - Stay up to date on mammograms, PAP smears,and other age related screening tests    > 30 minute consultation/follow up today with >50% spent in direct discussion with the patient/family as well as additional time spent in coordination of care/discussion with care team.     Orders This Visit:  Orders Placed This Encounter   Procedures    Calprotectin, Fecal    GI Stool panel by PCR    C. diff toxigenic PCR (OPT)       Meds This Visit:  Requested Prescriptions     Signed Prescriptions Disp Refills    budesonide 3 MG Oral Cap DR Particles 90 capsule 3     Sig: Take 3 capsules (9 mg total) by mouth every morning. Take 3 capsules (9mg) every day in the morning for up to 8 weeks       Imaging & Referrals:  None         Tita Jacobsen MD          This note was partially prepared using Dragon Medical voice recognition dictation software. As a result, errors may occur. When identified, these  errors have been corrected. While every attempt is made to correct errors during dictation, discrepancies may still exist.

## 2025-07-24 NOTE — PATIENT INSTRUCTIONS
PLAN    - Please have the stool testing done    - Start budesonide 9 mg daily     - Follow up in ~2 weeks

## 2025-08-07 ENCOUNTER — LAB ENCOUNTER (OUTPATIENT)
Dept: LAB | Facility: HOSPITAL | Age: 80
End: 2025-08-07
Attending: INTERNAL MEDICINE

## 2025-08-07 ENCOUNTER — VIRTUAL PHONE E/M (OUTPATIENT)
Dept: GASTROENTEROLOGY | Facility: CLINIC | Age: 80
End: 2025-08-07

## 2025-08-07 DIAGNOSIS — K51.311 ULCERATIVE RECTOSIGMOIDITIS WITH RECTAL BLEEDING (HCC): ICD-10-CM

## 2025-08-07 DIAGNOSIS — Z00.00 ROUTINE GENERAL MEDICAL EXAMINATION AT A HEALTH CARE FACILITY: ICD-10-CM

## 2025-08-07 DIAGNOSIS — K92.1 HEMATOCHEZIA: Primary | ICD-10-CM

## 2025-08-07 DIAGNOSIS — K51.311 ULCERATIVE RECTOSIGMOIDITIS WITH RECTAL BLEEDING (HCC): Primary | ICD-10-CM

## 2025-08-07 DIAGNOSIS — K92.1 HEMATOCHEZIA: ICD-10-CM

## 2025-08-07 LAB
ALBUMIN SERPL-MCNC: 4.3 G/DL (ref 3.2–4.8)
ALBUMIN/GLOB SERPL: 1.5 (ref 1–2)
ALP LIVER SERPL-CCNC: 76 U/L (ref 55–142)
ALT SERPL-CCNC: 10 U/L (ref 10–49)
ANION GAP SERPL CALC-SCNC: 6 MMOL/L (ref 0–18)
AST SERPL-CCNC: 14 U/L (ref ?–34)
BASOPHILS # BLD AUTO: 0.05 X10(3) UL (ref 0–0.2)
BASOPHILS NFR BLD AUTO: 0.7 %
BILIRUB SERPL-MCNC: 0.3 MG/DL (ref 0.2–1.1)
BUN BLD-MCNC: 13 MG/DL (ref 9–23)
BUN/CREAT SERPL: 11.6 (ref 10–20)
CALCIUM BLD-MCNC: 9.4 MG/DL (ref 8.7–10.4)
CHLORIDE SERPL-SCNC: 104 MMOL/L (ref 98–112)
CO2 SERPL-SCNC: 29 MMOL/L (ref 21–32)
CREAT BLD-MCNC: 1.12 MG/DL (ref 0.55–1.02)
CRP SERPL-MCNC: 1.2 MG/DL (ref ?–0.5)
DEPRECATED RDW RBC AUTO: 40.9 FL (ref 35.1–46.3)
EGFRCR SERPLBLD CKD-EPI 2021: 50 ML/MIN/1.73M2 (ref 60–?)
EOSINOPHIL # BLD AUTO: 0.55 X10(3) UL (ref 0–0.7)
EOSINOPHIL NFR BLD AUTO: 8.1 %
ERYTHROCYTE [DISTWIDTH] IN BLOOD BY AUTOMATED COUNT: 15.5 % (ref 11–15)
FASTING STATUS PATIENT QL REPORTED: NO
GLOBULIN PLAS-MCNC: 2.9 G/DL (ref 2–3.5)
GLUCOSE BLD-MCNC: 87 MG/DL (ref 70–99)
HCT VFR BLD AUTO: 34.1 % (ref 35–48)
HGB BLD-MCNC: 10.9 G/DL (ref 12–16)
IMM GRANULOCYTES # BLD AUTO: 0.03 X10(3) UL (ref 0–1)
IMM GRANULOCYTES NFR BLD: 0.4 %
LYMPHOCYTES # BLD AUTO: 1.71 X10(3) UL (ref 1–4)
LYMPHOCYTES NFR BLD AUTO: 25.1 %
MCH RBC QN AUTO: 23.5 PG (ref 26–34)
MCHC RBC AUTO-ENTMCNC: 32 G/DL (ref 31–37)
MCV RBC AUTO: 73.5 FL (ref 80–100)
MONOCYTES # BLD AUTO: 0.79 X10(3) UL (ref 0.1–1)
MONOCYTES NFR BLD AUTO: 11.6 %
NEUTROPHILS # BLD AUTO: 3.67 X10 (3) UL (ref 1.5–7.7)
NEUTROPHILS # BLD AUTO: 3.67 X10(3) UL (ref 1.5–7.7)
NEUTROPHILS NFR BLD AUTO: 54.1 %
OSMOLALITY SERPL CALC.SUM OF ELEC: 287 MOSM/KG (ref 275–295)
PLATELET # BLD AUTO: 324 10(3)UL (ref 150–450)
POTASSIUM SERPL-SCNC: 4.1 MMOL/L (ref 3.5–5.1)
PROT SERPL-MCNC: 7.2 G/DL (ref 5.7–8.2)
RBC # BLD AUTO: 4.64 X10(6)UL (ref 3.8–5.3)
SODIUM SERPL-SCNC: 139 MMOL/L (ref 136–145)
WBC # BLD AUTO: 6.8 X10(3) UL (ref 4–11)

## 2025-08-07 PROCEDURE — G2252 BRIEF CHKIN BY MD/QHP, 11-20: HCPCS | Performed by: INTERNAL MEDICINE

## 2025-08-07 PROCEDURE — 83993 ASSAY FOR CALPROTECTIN FECAL: CPT

## 2025-08-07 PROCEDURE — 80053 COMPREHEN METABOLIC PANEL: CPT

## 2025-08-07 PROCEDURE — 87493 C DIFF AMPLIFIED PROBE: CPT

## 2025-08-07 PROCEDURE — 36415 COLL VENOUS BLD VENIPUNCTURE: CPT

## 2025-08-07 PROCEDURE — 85025 COMPLETE CBC W/AUTO DIFF WBC: CPT

## 2025-08-07 PROCEDURE — 87507 IADNA-DNA/RNA PROBE TQ 12-25: CPT

## 2025-08-07 PROCEDURE — 86140 C-REACTIVE PROTEIN: CPT

## 2025-08-07 RX ORDER — PREDNISONE 10 MG/1
40 TABLET ORAL DAILY
Qty: 200 TABLET | Refills: 1 | Status: SHIPPED | OUTPATIENT
Start: 2025-08-07

## 2025-08-07 RX ORDER — PREDNISONE 10 MG/1
40 TABLET ORAL DAILY
Qty: 200 TABLET | Refills: 1 | Status: SHIPPED | OUTPATIENT
Start: 2025-08-07 | End: 2025-08-07

## 2025-08-08 ENCOUNTER — RESULTS FOLLOW-UP (OUTPATIENT)
Dept: GASTROENTEROLOGY | Facility: CLINIC | Age: 80
End: 2025-08-08

## 2025-08-08 LAB — C DIFF TOX B STL QL: NEGATIVE

## 2025-08-10 LAB — CALPROTECTIN STL-MCNT: 398 ΜG/G (ref ?–50)

## 2025-08-14 ENCOUNTER — VIRTUAL PHONE E/M (OUTPATIENT)
Dept: GASTROENTEROLOGY | Facility: CLINIC | Age: 80
End: 2025-08-14

## 2025-08-14 DIAGNOSIS — K51.319 ULCERATIVE RECTOSIGMOIDITIS WITH COMPLICATION (HCC): Primary | ICD-10-CM

## 2025-08-14 PROCEDURE — G2252 BRIEF CHKIN BY MD/QHP, 11-20: HCPCS | Performed by: INTERNAL MEDICINE

## 2025-08-14 RX ORDER — PREDNISONE 5 MG/1
5 TABLET ORAL DAILY
Qty: 200 TABLET | Refills: 1 | Status: SHIPPED | OUTPATIENT
Start: 2025-08-14 | End: 2025-08-14

## 2025-08-14 RX ORDER — PREDNISONE 5 MG/1
5 TABLET ORAL DAILY
Qty: 200 TABLET | Refills: 1 | Status: SHIPPED | OUTPATIENT
Start: 2025-08-14

## 2025-08-18 DIAGNOSIS — F41.9 ANXIETY: ICD-10-CM

## 2025-08-21 RX ORDER — LORAZEPAM 1 MG/1
1 TABLET ORAL DAILY PRN
Qty: 30 TABLET | Refills: 2 | Status: SHIPPED | OUTPATIENT
Start: 2025-08-21

## 2025-08-25 ENCOUNTER — TELEPHONE (OUTPATIENT)
Facility: CLINIC | Age: 80
End: 2025-08-25

## (undated) DEVICE — CONMED SCOPE SAVER BITE BLOCK, 20X27 MM: Brand: SCOPE SAVER

## (undated) DEVICE — HEXAGONAL CAPTIVATOR STIFF 13M

## (undated) DEVICE — SET TB INFLO FOR TRUCLEAR SYS HYSTEROLUX

## (undated) DEVICE — KIT ENDO ORCAPOD 160/180/190

## (undated) DEVICE — C-ARM: Brand: UNBRANDED

## (undated) DEVICE — TOWEL SURG OR 17X30IN BLUE

## (undated) DEVICE — MEDI-VAC NON-CONDUCTIVE SUCTION TUBING: Brand: CARDINAL HEALTH

## (undated) DEVICE — CANISTER SUCT 3000CC HI FLO DISP FOR FLD MGMT

## (undated) DEVICE — KIT DRN 1/8IN PVC 3 SPRG EVAC

## (undated) DEVICE — ENCORE® LATEX MICRO SIZE 8, STERILE LATEX POWDER-FREE SURGICAL GLOVE: Brand: ENCORE

## (undated) DEVICE — SET XTN .1IN 2.7ML 20IN IV

## (undated) DEVICE — ENDOPATH 5MM ENDOSCOPIC BLUNT TIP DISSECTORS (12 POUCHES CONTAINING 3 DISSECTORS EACH): Brand: ENDOPATH

## (undated) DEVICE — 3.0MM PRECISION NEURO (MATCH HEAD)

## (undated) DEVICE — GAUZE SPONGES,12 PLY: Brand: CURITY

## (undated) DEVICE — GAMMEX® PI HYBRID SIZE 6.5, STERILE POWDER-FREE SURGICAL GLOVE, POLYISOPRENE AND NEOPRENE BLEND: Brand: GAMMEX

## (undated) DEVICE — 11.1-M5 MULTIMODALITY KIT 5

## (undated) DEVICE — HYSTEROSCOPY: Brand: MEDLINE INDUSTRIES, INC.

## (undated) DEVICE — C-ARMOR C-ARM EQUIPMENT COVERS CLEAR STERILE UNIVERSAL FIT 12 PER CASE: Brand: C-ARMOR

## (undated) DEVICE — 60 ML SYRINGE REGULAR TIP: Brand: MONOJECT

## (undated) DEVICE — ENCORE® LATEX MICRO SIZE 7.5, STERILE LATEX POWDER-FREE SURGICAL GLOVE: Brand: ENCORE

## (undated) DEVICE — CHLORAPREP ORANGE TINT 10.5ML

## (undated) DEVICE — SUCTION CANISTER, 3000CC,SAFELINER: Brand: DEROYAL

## (undated) DEVICE — PEN: MARKING STD PT 100/CS: Brand: MEDICAL ACTION INDUSTRIES

## (undated) DEVICE — GAMMEX® PI HYBRID SIZE 8, STERILE POWDER-FREE SURGICAL GLOVE, POLYISOPRENE AND NEOPRENE BLEND: Brand: GAMMEX

## (undated) DEVICE — FRAZIER SUCTION INSTRUMENT 8 FR W/CONTROL VENT & OBTURATOR: Brand: FRAZIER

## (undated) DEVICE — LAMINECTOMY: Brand: MEDLINE INDUSTRIES, INC.

## (undated) DEVICE — GIJAW SINGLE-USE BIOPSY FORCEPS WITH NEEDLE: Brand: GIJAW

## (undated) DEVICE — CO2 CANNULA,SSOFT,ADLT,7O2,4CO2,FEMALE: Brand: MEDLINE

## (undated) DEVICE — SOFT TISSUE SHAVER MINI: Brand: TRUCLEAR

## (undated) DEVICE — FLEXIBLE ADHESIVE BANDAGE: Brand: CURITY

## (undated) DEVICE — KIT SPNL XLIF NRVSN DIL M5

## (undated) DEVICE — KIT CLEAN ENDOKIT 1.1OZ GOWNX2

## (undated) DEVICE — SUTURE VICRYL 3-0 RB-1

## (undated) DEVICE — MEDI-VAC NON-CONDUCTIVE SUCTION TUBING 6MM X 1.8M (6FT.) L: Brand: CARDINAL HEALTH

## (undated) DEVICE — V2 SPECIMEN COLLECTION TRAY: Brand: NEPTUNE

## (undated) DEVICE — SOL  .9 1000ML BTL

## (undated) DEVICE — OMNIPAQUE 240ML VIAL

## (undated) DEVICE — 20 ML SYRINGE LUER-LOCK TIP: Brand: MONOJECT

## (undated) DEVICE — ENCORE® LATEX ACCLAIM SIZE 7.5, STERILE LATEX POWDER-FREE SURGICAL GLOVE: Brand: ENCORE

## (undated) DEVICE — SNAP KOVER: Brand: UNBRANDED

## (undated) DEVICE — 3M™ STERI-DRAPE™ INCISE DRAPE 1050 (60CM X 45CM): Brand: STERI-DRAPE™

## (undated) DEVICE — WIRE FX PRCPT KRSH BVL BLNT

## (undated) DEVICE — DRAPE SHEET LG

## (undated) DEVICE — 3M™ TEGADERM™ TRANSPARENT FILM DRESSING, 1626W, 4 IN X 4-3/4 IN (10 CM X 12 CM), 50 EACH/CARTON, 4 CARTON/CASE: Brand: 3M™ TEGADERM™

## (undated) DEVICE — GAMMEX® PI HYBRID SIZE 7, STERILE POWDER-FREE SURGICAL GLOVE, POLYISOPRENE AND NEOPRENE BLEND: Brand: GAMMEX

## (undated) DEVICE — LASSO POLYPECTOMY SNARE: Brand: LASSO

## (undated) DEVICE — V2 SPECIMEN COLLECTION MANIFOLD KIT: Brand: NEPTUNE

## (undated) DEVICE — DRESSING BIOPATCH 1X4 CNTR

## (undated) DEVICE — INSULATED BLADE ELECTRODE 6.5

## (undated) DEVICE — ANTERIOR POSTERIOR ADD ON PACK: Brand: MEDLINE INDUSTRIES, INC.

## (undated) DEVICE — KIT HYSTEROSCOPIC PROC INCL INFLO OUTFLO TB

## (undated) DEVICE — 3.0MM NEURO (MATCH HEAD) SOFT TOUCH

## (undated) DEVICE — ELITE HYSTEROSCOPE SEAL: Brand: TRUCLEAR

## (undated) DEVICE — NEEDLE 18G 1-1/2 BLUNT FILL

## (undated) DEVICE — 12 ML SYRINGE LUER-LOCK TIP: Brand: MONOJECT

## (undated) DEVICE — ENCORE® LATEX MICRO SIZE 7, STERILE LATEX POWDER-FREE SURGICAL GLOVE: Brand: ENCORE

## (undated) DEVICE — KIT ACCESS MAXCESS 4

## (undated) DEVICE — SYRINGE MNJCT 35ML LF STRL LL

## (undated) DEVICE — CHLORAPREP 26ML APPLICATOR

## (undated) DEVICE — Device: Brand: DUAL NARE NASAL CANNULAE FEMALE LUER CON 7FT O2 TUBE

## (undated) DEVICE — SET IRRIG L96IN POST OP W/ NVENT 2ND PIERCING

## (undated) DEVICE — VIOLET BRAIDED (POLYGLACTIN 910), SYNTHETIC ABSORBABLE SUTURE: Brand: COATED VICRYL

## (undated) DEVICE — 6 ML SYRINGE LUER-LOCK TIP: Brand: MONOJECT

## (undated) DEVICE — HYSTEROSCOPIC OUTFLOW TUBE SET

## (undated) DEVICE — STANDARD HYPODERMIC NEEDLE,POLYPROPYLENE HUB: Brand: MONOJECT

## (undated) DEVICE — NV I-PAS III BEVELED TIP STER

## (undated) DEVICE — FRAZIER SUCTION INSTRUMENT 12 FR W/CONTROL VENT & OBTURATOR: Brand: FRAZIER

## (undated) DEVICE — MICRO KOVER: Brand: UNBRANDED

## (undated) DEVICE — CAUTERY BLADE 2IN INS E1455

## (undated) DEVICE — FORCEPS BX L240CM DIA2.4MM L NDL RAD JAW 4

## (undated) DEVICE — YANKAUER SUCTION INSTRUMENT NO CONTROL VENT, BULB TIP, CLEAR: Brand: YANKAUER

## (undated) DEVICE — EXOFIN TISSUE ADHESIVE 1.0ML

## (undated) NOTE — LETTER
7/20/2022  [address] [address]    We take each of our patient's health very seriously and the key to maintaining your health is an annual wellness physical.  Review of your medical records shows that it is time for your FIT test.  Please contact my office at your earliest convenience to schedule this appointment at [phone].   Thank Yosef Forman MD

## (undated) NOTE — LETTER
11/16/2023              Gertrude Gordon        [address]        [place] [facility] [address]         Dear Raisa Corona,    [facility] records indicate that the tests ordered for you by Karen Khan MD  have not been done. If you have, in fact, already completed the tests or you do not wish to have the tests done, please contact our office at [address] [place] [address]. Otherwise, please proceed with the testing. To schedule a test at any [facility], call [facility] at [phone], Monday through Friday between 7:30am to 6pm and on Saturday between 8am and 1pm.   Evening and weekend appointments for your exam are available.    Imaging order :  Order date: 10/4/2023  XR UGI/ESOPHAGUS DOUBLE CONTRAST ([id number])         Sincerely,    Karen Khan MD  [facility] MEDICAL [facility], [address], [address] [address] [phone]

## (undated) NOTE — LETTER
2/15/2024              Jaz Thayer        [address] [facility] [address]         Dear Jaz,    Our records indicate that the tests ordered for you by Billy Philip MD  have not been done.  If you have, in fact, already completed the tests or you do not wish to have the tests done, please contact our office at THE NUMBER LISTED BELOW.  Otherwise, please proceed with the Pulmonary Function Testing.     Sincerely,    Billy Philip MD  [address] [address] [phone]

## (undated) NOTE — LETTER
Hospital Discharge Documentation  Please phone to schedule a hospital follow up appointment.     From: [address] [facility]  Phone: [phone]    Patient discharged time/date: 8/8/2021  3:00 PM  Patient discharge disposition:  Home or Self progressive back pain which has been getting worse in the last week or two with radiation to both lower extremities.   She had an MRI scan of the lumbar spine today which showed degenerative joint disease of the lumbar spine, most prominent at L4-5, with cr HYDROcodone-acetaminophen 5-325 MG Oral Tab  Take 1 tablet by mouth every 4 (four) hours as needed. cyclobenzaprine 10 MG Oral Tab  Take 1 tablet (10 mg total) by mouth 3 (three) times daily as needed for Muscle spasms.       Home Meds - Unchanged    Eta Omeprazole 40 MG Cpdr  What changed: Another medication with the same name was removed. Continue taking this medication, and follow the directions you see here. Take 1 capsule (40 mg total) by mouth daily.  Before a meal   Quantity: 30 capsule  Refills prescription for each of these medications  · cyclobenzaprine 10 MG Tabs  · HYDROcodone-acetaminophen 5-325 MG Tabs         Follow up: Follow-up Information     Co, Randy Franks MD In 1 week.     Specialty: Internal Medicine  Contact information:  [address] [address]

## (undated) NOTE — LETTER
Hospital Discharge Documentation  Please phone to schedule a hospital follow up appointment.     From: [address] [facility] Hospitalist's Office  Phone: [phone]    Patient discharged time/date: 8/26/2021  7:42 PM  Patient discharge disposition:  Home or Self stenosis      Reason for Admission:   Lumbar radiculopathy  Severe Back Pain      Physical Exam:   General appearance: alert, appears stated age and cooperative  Pulmonary:  clear to auscultation bilaterally  Cardiovascular: S1, S2 normal, no murmur, click Discharge Medications      START taking these medications      Instructions Prescription details   HYDROcodone-acetaminophen  MG Tabs  Commonly known as: NORCO  Replaces: HYDROcodone-acetaminophen 5-325 MG Tabs      Take 1 tablet by mouth every 4 ( Tabs        predniSONE 5 MG Tabs  Commonly known as: Юлия Perry              Where to Get Your Medications      These medications were sent to [facility] [id number]  [address], [address], [address]

## (undated) NOTE — LETTER
[place] [address], [place], [place]    Authorization for Surgical Operation and Procedure                               I hereby authorize Fabiana Connell MD, my physician and his/her assistants (if applicable), which may include medical students, residents, and/or fellows, to perform the following surgical operation/ procedure and administer such anesthesia as may be determined necessary by my physician: Operation/Procedure name (s) COLONOSCOPY on Jaz Thayer   2.   I recognize that during the surgical operation/procedure, unforeseen conditions may necessitate additional or different procedures than those listed above.  I, therefore, further authorize and request that the above-named surgeon, assistants, or designees perform such procedures as are, in their judgment, necessary and desirable.    3.   My surgeon/physician has discussed prior to my surgery the potential benefits, risks and side effects of this procedure; the likelihood of achieving goals; and potential problems that might occur during recuperation.  They also discussed reasonable alternatives to the procedure, including risks, benefits, and side effects related to the alternatives and risks related to not receiving this procedure.  I have had all my questions answered and I acknowledge that no guarantee has been made as to the result that may be obtained.    4.   Should the need arise during my operation/procedure, which includes change of level of care prior to discharge, I also consent to the administration of blood and/or blood products.  Further, I understand that despite careful testing and screening of blood or blood products by collecting agencies, I may still be subject to ill effects as a result of receiving a blood transfusion and/or blood products.  The following are some, but not all, of the potential risks that can occur: fever and allergic reactions, hemolytic reactions, transmission of  diseases such as Hepatitis, AIDS and Cytomegalovirus (CMV) and fluid overload.  In the event that I wish to have an autologous transfusion of my own blood, or a directed donor transfusion, I will discuss this with my physician.  Check only if Refusing Blood or Blood Products  I understand refusal of blood or blood products as deemed necessary by my physician may have serious consequences to my condition to include possible death. I hereby assume responsibility for my refusal and release the hospital, its personnel, and my physicians from any responsibility for the consequences of my refusal.    o  Refuse   5.   I authorize the use of any specimen, organs, tissues, body parts or foreign objects that may be removed from my body during the operation/procedure for diagnosis, research or teaching purposes and their subsequent disposal by hospital authorities.  I also authorize the release of specimen test results and/or written reports to my treating physician on the hospital medical staff or other referring or consulting physicians involved in my care, at the discretion of the Pathologist or my treating physician.    6.   I consent to the photographing or videotaping of the operations or procedures to be performed, including appropriate portions of my body for medical, scientific, or educational purposes, provided my identity is not revealed by the pictures or by descriptive texts accompanying them.  If the procedure has been photographed/videotaped, the surgeon will obtain the original picture, image, videotape or CD.  The hospital will not be responsible for storage, release or maintenance of the picture, image, tape or CD.    7.   I consent to the presence of a  or observers in the operating room as deemed necessary by my physician or their designees.    8.   I recognize that in the event my procedure results in extended X-Ray/fluoroscopy time, I may develop a skin reaction.    9. If I have a Do Not  Attempt Resuscitation (DNAR) order in place, that status will be suspended while in the operating room, procedural suite, and during the recovery period unless otherwise explicitly stated by me (or a person authorized to consent on my behalf). The surgeon or my attending physician will determine when the applicable recovery period ends for purposes of reinstating the DNAR order.  10. Patients having a sterilization procedure: I understand that if the procedure is successful the results will be permanent and it will therefore be impossible for me to inseminate, conceive, or bear children.  I also understand that the procedure is intended to result in sterility, although the result has not been guaranteed.   11. I acknowledge that my physician has explained sedation/analgesia administration to me including the risk and benefits I consent to the administration of sedation/analgesia as may be necessary or desirable in the judgment of my physician.    I CERTIFY THAT I HAVE READ AND FULLY UNDERSTAND THE ABOVE CONSENT TO OPERATION and/or OTHER PROCEDURE.     ____________________________________  _________________________________        ______________________________  Signature of Patient    Signature of Responsible Person                Printed Name of Responsible Person                                      ____________________________________  _____________________________                ________________________________  Signature of Witness        Date  Time         Relationship to Patient    STATEMENT OF PHYSICIAN My signature below affirms that prior to the time of the procedure; I have explained to the patient and/or his/her legal representative, the risks and benefits involved in the proposed treatment and any reasonable alternative to the proposed treatment. I have also explained the risks and benefits involved in refusal of the proposed treatment and alternatives to the proposed treatment and have answered the  patient's questions. If I have a significant financial interest in a co-management agreement or a significant financial interest in any product or implant, or other significant relationship used in this procedure/surgery, I have disclosed this and had a discussion with my patient.     _____________________________________________________              _____________________________  (Signature of Physician)                                                                                         (Date)                                   (Time)  Patient Name: Jaz Thayer      : 1945      Printed: 2024     Medical Record #: [id number]                                      Page 1 of 1

## (undated) NOTE — LETTER
Patient Name: Raciel Levine  YOB: 1945          MRN number:  [id number]  Date:  7/15/2021  Referring Physician:  Rosio Larry    ADULT VIDEOFLUOROSCOPIC SWALLOWING STUDY:    Referring Physician: Co      Radiologist: Dr. Freddy White solids and at the tongue base to aryepiglottic folds for liquids due to slight delay/hestitation of swallow. This resulted in shallow, transient laryngeal penetration intermittently with thin liquids by cup and straw.  No material remained in the laryngeal agreement verbalized. Patient/Family/Caregiver was advised of these findings, precautions, recommendations.     Thank you for your referral.  If you have any questions, please contact me at Dept: [address] [place]/[facility]SL[facility]   [facility]

## (undated) NOTE — LETTER
[facility]  Administration of Anesthesia  I, Jaz Thayer agree to be cared for by a physician anesthesiologist alone and/or with a nurse anesthetist, who is specially trained to monitor me and give me medicine to put me to sleep or keep me comfortable during my procedure    I understand that my anesthesiologist and/or anesthetist is not an employee or agent of [facility] or [employer]. He or she works for [facility]    As the patient asking for anesthesia services, I agree to:  Allow the anesthesiologist (anesthesia doctor) to give me medicine and do additional procedures as necessary. Some examples are: Starting or using an “IV” to give me medicine, fluids or blood during my procedure, and having a breathing tube placed to help me breathe when I’m asleep (intubation). In the event that my heart stops working properly, I understand that my anesthesiologist will make every effort to sustain my life, unless otherwise directed by [facility] Do Not Resuscitate documents.  Tell my anesthesia doctor before my procedure:  If I am pregnant.  The last time that I ate or drank.  iii. All of the medicines I take (including prescriptions, herbal supplements, and pills I can buy without a prescription (including street drugs/illegal medications). Failure to inform my anesthesiologist about these medicines may increase my risk of anesthetic complications.  iv.If I am allergic to anything or have had a reaction to anesthesia before.  I understand how the anesthesia medicine will help me (benefits).  I understand that with any type of anesthesia medicine there are risks:  The most common risks are: nausea, vomiting, sore throat, muscle soreness, damage to my eyes, mouth, or teeth (from breathing tube placement).  Rare risks include: remembering what happened during my procedure, allergic reactions to medications, injury to my airway, heart, lungs, vision, nerves, or  muscles and in extremely rare instances death.  My doctor has explained to me other choices available to me for my care (alternatives).  Pregnant Patients (“epidural”):  I understand that the risks of having an epidural (medicine given into my back to help control pain during labor), include itching, low blood pressure, difficulty urinating, headache or slowing of the baby’s heart. Very rare risks include infection, bleeding, seizure, irregular heart rhythms and nerve injury.  Regional Anesthesia (“spinal”, “epidural”, & “nerve blocks”):  I understand that rare but potential complications include headache, bleeding, infection, seizure, irregular heart rhythms, and nerve injury.    _____________________________________________________________________________  Patient (or Representative) Signature/Relationship to Patient  Date   Time    _____________________________________________________________________________   Name (if used)    Language/Organization   Time    _____________________________________________________________________________  Nurse Anesthetist Signature     Date   Time  _____________________________________________________________________________  Anesthesiologist Signature     Date   Time  I have discussed the procedure and information above with the patient (or patient’s representative) and answered their questions. The patient or their representative has agreed to have anesthesia services.    _____________________________________________________________________________  Witness        Date   Time  I have verified that the signature is that of the patient or patient’s representative, and that it was signed before the procedure  Patient Name: Jaz Thayer     : 1945                 Printed: 2024 at 9:28 AM    Medical Record #: [id number]                                            Page 1 of 1  ----------ANESTHESIA CONSENT----------

## (undated) NOTE — LETTER
[address]  [address] [place], [place]  Authorization for Surgical Operation and Procedure                                                                                           I hereby authorize Yoana Tapia MD, my physician and his/her assistants (if applicable), which may include medical students, residents, and/or fellows, to perform the following surgical operation/ procedure and administer such anesthesia as may be determined necessary by my physician: Operation/Procedure name (s) ESOPHAGOGASTRODUODENOSCOPY on Lola Pope   2.   I recognize that during the surgical operation/procedure, unforeseen conditions may necessitate additional or different procedures than those listed above. I, therefore, further authorize and request that the above-named surgeon, assistants, or designees perform such procedures as are, in their judgment, necessary and desirable. 3.   My surgeon/physician has discussed prior to my surgery the potential benefits, risks and side effects of this procedure; the likelihood of achieving goals; and potential problems that might occur during recuperation. They also discussed reasonable alternatives to the procedure, including risks, benefits, and side effects related to the alternatives and risks related to not receiving this procedure. I have had all my questions answered and I acknowledge that no guarantee has been made as to the result that may be obtained. 4.   Should the need arise during my operation/procedure, which includes change of level of care prior to discharge, I also consent to the administration of blood and/or blood products. Further, I understand that despite careful testing and screening of blood or blood products by collecting agencies, I may still be subject to ill effects as a result of receiving a blood transfusion and/or blood products.   The following are some, but not all, of the potential risks that can occur: fever and allergic reactions, hemolytic reactions, transmission of diseases such as Hepatitis, AIDS and Cytomegalovirus (CMV) and fluid overload. In the event that I wish to have an autologous transfusion of my own blood, or a directed donor transfusion, I will discuss this with my physician. Check only if Refusing Blood or Blood Products  I understand refusal of blood or blood products as deemed necessary by my physician may have serious consequences to my condition to include possible death. I hereby assume responsibility for my refusal and release the hospital, its personnel, and my physicians from any responsibility for the consequences of my refusal.    o  Refuse   5. I authorize the use of any specimen, organs, tissues, body parts or foreign objects that may be removed from my body during the operation/procedure for diagnosis, research or teaching purposes and their subsequent disposal by hospital authorities. I also authorize the release of specimen test results and/or written reports to my treating physician on the hospital medical staff or other referring or consulting physicians involved in my care, at the discretion of the Pathologist or my treating physician. 6.   I consent to the photographing or videotaping of the operations or procedures to be performed, including appropriate portions of my body for medical, scientific, or educational purposes, provided my identity is not revealed by the pictures or by descriptive texts accompanying them. If the procedure has been photographed/videotaped, the surgeon will obtain the original picture, image, videotape or CD. The hospital will not be responsible for storage, release or maintenance of the picture, image, tape or CD.    7.   I consent to the presence of a  or observers in the operating room as deemed necessary by my physician or their designees.     8.   I recognize that in the event my procedure results in extended X-Ray/fluoroscopy time, I may develop a skin reaction. 9. If I have a Do Not Attempt Resuscitation (DNAR) order in place, that status will be suspended while in the operating room, procedural suite, and during the recovery period unless otherwise explicitly stated by me (or a person authorized to consent on my behalf). The surgeon or my attending physician will determine when the applicable recovery period ends for purposes of reinstating the DNAR order. 10. Patients having a sterilization procedure: I understand that if the procedure is successful the results will be permanent and it will therefore be impossible for me to inseminate, conceive, or bear children. I also understand that the procedure is intended to result in sterility, although the result has not been guaranteed. 11. I acknowledge that my physician has explained sedation/analgesia administration to me including the risk and benefits I consent to the administration of sedation/analgesia as may be necessary or desirable in the judgment of my physician. I CERTIFY THAT I HAVE READ AND FULLY UNDERSTAND THE ABOVE CONSENT TO OPERATION and/or OTHER PROCEDURE.     _________________________________________ _________________________________     ___________________________________  Signature of Patient     Signature of Responsible Person                   Printed Name of Responsible Person                              _________________________________________ ______________________________        ___________________________________  Signature of Witness         Date  Time         Relationship to Patient    STATEMENT OF PHYSICIAN My signature below affirms that prior to the time of the procedure; I have explained to the patient and/or his/her legal representative, the risks and benefits involved in the proposed treatment and any reasonable alternative to the proposed treatment.  I have also explained the risks and benefits involved in refusal of the proposed treatment and alternatives to the proposed treatment and have answered the patient's questions.  If I have a significant financial interest in a co-management agreement or a significant financial interest in any product or implant, or other significant relationship used in this procedure/surgery, I have disclosed this and had a discussion with my patient.     _______________________________________________________________ _____________________________  (Signature of Physician)                                                                                         (Date)                                   (Time)  Patient Name: Ramandeep Glass    : [birth year]   Printed: 10/2/2023      Medical Record #: [id number]                                              Page 1 of 1

## (undated) NOTE — LETTER
5/5/2025          Jaz Thayer    [address] [address]         Dear Jaz,    Our records indicate that the tests ordered for you by Tita Jacobsen MD  have not been done.  If you have, in fact, already completed the tests or you do not wish to have the tests done, please contact our office at THE NUMBER LISTED BELOW.  Otherwise, please proceed with the testing.  Enclosed is a duplicate order for your convenience.  Labs Order:    Basic Metabolic Panel (8) (Order #[id number]) on 4/2/25     To Schedule a test at [facility]  Please call Central Scheduling At [phone] Monday through Friday between 7:30 am to 6:00 pm and on Saturday 8:00 am to 1:00 pm        Sincerely,    Tita Jacobsen MD  [facility]  [address]  [facility] [id number] [phone]

## (undated) NOTE — LETTER
[address]   Date:   8/4/2021     Name:   Rosanna Chatterjee    YOB: 1945   MRN:   [id number]       WHERE IS YOUR PAIN NOW? Jadon the areas on your body where you feel the described sensations.   Use the appropriate

## (undated) NOTE — MR AVS SNAPSHOT
[facility] at [address] [place] [address] Road [address] [phone]               Thank you for choosing us for your health care visit with [place] [address] NURSE.   We are glad to serve you and happy to provide you 1 by Oral route  every day   Commonly known as:  PROTONIX                   Results of Recent Testing     C-REACTIVE PROTEIN      Component Value Standard Range & Units    C-Reactive Protein 1.4 0.0-0.9 mg/dL                CBC WITH DIFFERENTIAL WITH PLATE Calculation is MOST accurate when individual's Triglyceride (TG) is <200. Calculation is COMPROMISED when TG is 200-400 mg/dl.     Calculation is INVALID when patient: is Non-Fasting, has TG > 400 mg/dl, Chylomicrons are present, Type III Hyperlipidemia,

## (undated) NOTE — LETTER
3/3/2025          Jaz Thayer    [address] [address]         Dear Jaz,    Our records indicate that the tests ordered for you by Tita Jacobsen MD  have not been done.  If you have, in fact, already completed the tests or you do not wish to have the tests done, please contact our office at THE NUMBER LISTED BELOW.  Otherwise, please proceed with the testing.  Enclosed is a duplicate order for your convenience.    Labs Order:  Comp Metabolic Panel (14) 1/2 remain  Hep B Core AB, Tot  Hepatitis B Surface Antigen  Hepatitis B Surface Antibody  Quantiferon TB Gold (in Tube)          Sincerely,    Tita Jacobsen MD  [facility]  [address] [facility] [address]  [facility] [address] [phone]

## (undated) NOTE — LETTER
[facility]  Administration of Anesthesia  1.  I, Brock Alcantar, or _________________________________ acting on her behalf, (Patient) (Dependent/Representative) request to receive anesthesia for my pending procedure/operation/treatmen spinal bleeding, seizure, cardiac arrest and death. 7. AWARENESS: I understand that it is possible (but unlikely) to have explicit memory of events from the operating room while under general anesthesia.   8. ELECTROCONVULSIVE THERAPY PATIENTS: This consen signature below affirms that prior to the time of the procedure, I have explained to the patient and/or his/her guardian, the risks and benefits of undergoing anesthesia, as well as any reasonable alternatives.     __________________________________________

## (undated) NOTE — LETTER
Dear German Kramer:    I have mailed you the FIT test (stool test) please complete and mail back to our office thank you     IF YOU'RE 50 OR OLDER, GETTING A COLORECTAL CANCER SCREENING TEST COULD SAVE YOUR LIFE    Colorectal cancer is the second leading cance

## (undated) NOTE — LETTER
[address] [facility]   Date:   7/5/2022     Name:   Serge Melgoza    YOB: 1945   MRN:   [id number]       WHERE IS YOUR PAIN NOW? Jonathon the areas on your body where you feel the described sensations. Use the appropriate symbol. Mesha Danker the areas of radiation. Include all affected areas. Just to complete the picture, please draw in the face. ACHE:  ^ ^ ^   NUMBNESS:  0000   PINS & NEEDLES:  = = = =                              ^ ^ ^                       0000              = = = =                                    ^ ^ ^                       0000            = = = =      BURNING:  XXXX   STABBING: ////                  XXXX                ////                         XXXX          ////     Please jonathon the line below indicating your degree of pain right now  with 0 being no pain 10 being the worst pain possible.                                          0             1             2              3             4              5              6              7             8             9             10         Patient Signature:

## (undated) NOTE — MR AVS SNAPSHOT
[facility] at [address] [place] [address] Road [id number] [phone]               Thank you for choosing us for your health care visit with Eri Huertas MD.  We are glad to serve you and happy to provide [facility]/Kyle Ashley  Diagnostics Main [facility] Parking) (Yellow Parking)  [address]   [address] [facility],  [place], [address] [facility]  [address]  Main physician's office. At that time, you will be provided with any authorization numbers or be assured that none are required. You can then schedule your appointment.  Failure to obtain required authorization numbers can create reimbursement difficulties for y your insurance carrier before scheduling to verify coverage.    PREVENTATIVE SERVICES  INDICATIONS AND SCHEDULE Internal Lab or Procedure External Lab or Procedure   Diabetes Screening      HbgA1C    At Least  Annually for Diabetics No results found for: A1 Covered Annually No results found for: FOB, OCCULTSTOOL No flowsheet data found.      Barium Enema-   uncomfortable but covered  Covered but uncomfortable   Glaucoma Screening      Ophthalmology Visit   Covered annually for Diabetics, people with Glaucoma f End-stage renal disease   Hemophiliacs who received Factor VIII or IX concentrates   Clients of institutions for the mentally retarded   Persons who live in the same house as a HepB virus carrier   Homosexual men   Illicit injectable drug abusers     Griffin Take one every morning. Commonly known as:  FOLVITE           ibuprofen 600 MG Tabs   Take 1 tablet (600 mg total) by mouth every 8 (eight) hours as needed for Pain.    Commonly known as:  MOTRIN           LORazepam 1 MG Tabs   take 1 tablet (1MG)  by ora Weight Reduction Maintain normal body weight (body mass index 18.5-24.9 kg/m2)   DASH eating plan Adopt a diet rich in fruits, vegetables, and low fat dairy products with reduced content of saturated and total fat.    Dietary sodium reduction Reduce dietary track your progress   You don’t need to join a gym. Home exercises work great.  Put more priority on exercise in your life                    Visit [facility] online at  [email or web address]

## (undated) NOTE — LETTER
[address], [place]  Authorization for Invasive Procedures  1.  I hereby authorize Dr. Charlene Smith , my physician and whomever may be designated as the doctor's assistant, to perform the following operation and/or procedure:  Bilateral blood producst. The following are some, but not all, of the potential risks that can occur: fever and allergic reactions, hemolytic reactions, transmission of disease such as hepatitis, AIDS, cytomegalovirus (CMV), and flluid overload.  In the event that I sedation/analgesia as may be necessary or desirable in the judgment of my physician.      Signature of Patient:  ________________________________________________ Date: _________Time: _________    Responsible person in case of minor or unconscious: _________